# Patient Record
Sex: FEMALE | Race: BLACK OR AFRICAN AMERICAN | NOT HISPANIC OR LATINO | Employment: UNEMPLOYED | URBAN - METROPOLITAN AREA
[De-identification: names, ages, dates, MRNs, and addresses within clinical notes are randomized per-mention and may not be internally consistent; named-entity substitution may affect disease eponyms.]

---

## 2017-01-24 ENCOUNTER — GENERIC CONVERSION - ENCOUNTER (OUTPATIENT)
Dept: OTHER | Facility: OTHER | Age: 22
End: 2017-01-24

## 2017-02-07 ENCOUNTER — APPOINTMENT (OUTPATIENT)
Dept: URGENT CARE | Facility: MEDICAL CENTER | Age: 22
End: 2017-02-07

## 2017-02-07 PROCEDURE — 99212 OFFICE O/P EST SF 10 MIN: CPT

## 2017-03-01 ENCOUNTER — APPOINTMENT (OUTPATIENT)
Dept: OCCUPATIONAL MEDICINE | Facility: CLINIC | Age: 22
End: 2017-03-01
Payer: OTHER MISCELLANEOUS

## 2017-03-01 PROCEDURE — 99213 OFFICE O/P EST LOW 20 MIN: CPT

## 2017-03-17 ENCOUNTER — ALLSCRIPTS OFFICE VISIT (OUTPATIENT)
Dept: OTHER | Facility: OTHER | Age: 22
End: 2017-03-17

## 2017-03-17 ENCOUNTER — APPOINTMENT (OUTPATIENT)
Dept: OCCUPATIONAL MEDICINE | Facility: CLINIC | Age: 22
End: 2017-03-17
Payer: OTHER MISCELLANEOUS

## 2017-03-17 PROCEDURE — 99213 OFFICE O/P EST LOW 20 MIN: CPT

## 2017-03-30 ENCOUNTER — APPOINTMENT (OUTPATIENT)
Dept: OCCUPATIONAL MEDICINE | Facility: CLINIC | Age: 22
End: 2017-03-30
Payer: OTHER MISCELLANEOUS

## 2017-03-30 PROCEDURE — 99213 OFFICE O/P EST LOW 20 MIN: CPT

## 2017-04-17 ENCOUNTER — ALLSCRIPTS OFFICE VISIT (OUTPATIENT)
Dept: OTHER | Facility: OTHER | Age: 22
End: 2017-04-17

## 2017-04-17 ENCOUNTER — APPOINTMENT (OUTPATIENT)
Dept: OCCUPATIONAL MEDICINE | Facility: CLINIC | Age: 22
End: 2017-04-17
Payer: OTHER MISCELLANEOUS

## 2017-04-17 PROCEDURE — 99213 OFFICE O/P EST LOW 20 MIN: CPT

## 2017-06-27 ENCOUNTER — ALLSCRIPTS OFFICE VISIT (OUTPATIENT)
Dept: OTHER | Facility: OTHER | Age: 22
End: 2017-06-27

## 2017-06-27 ENCOUNTER — GENERIC CONVERSION - ENCOUNTER (OUTPATIENT)
Dept: OTHER | Facility: OTHER | Age: 22
End: 2017-06-27

## 2017-06-27 DIAGNOSIS — Z34.82 ENCOUNTER FOR SUPERVISION OF OTHER NORMAL PREGNANCY, SECOND TRIMESTER: ICD-10-CM

## 2017-06-27 DIAGNOSIS — Z34.90 ENCOUNTER FOR SUPERVISION OF NORMAL PREGNANCY: ICD-10-CM

## 2017-06-28 ENCOUNTER — GENERIC CONVERSION - ENCOUNTER (OUTPATIENT)
Dept: OTHER | Facility: OTHER | Age: 22
End: 2017-06-28

## 2017-06-28 PROCEDURE — 87591 N.GONORRHOEAE DNA AMP PROB: CPT | Performed by: OBSTETRICS & GYNECOLOGY

## 2017-06-28 PROCEDURE — 87491 CHLMYD TRACH DNA AMP PROBE: CPT | Performed by: OBSTETRICS & GYNECOLOGY

## 2017-06-29 ENCOUNTER — LAB REQUISITION (OUTPATIENT)
Dept: LAB | Facility: HOSPITAL | Age: 22
End: 2017-06-29
Payer: COMMERCIAL

## 2017-06-29 DIAGNOSIS — Z34.90 ENCOUNTER FOR SUPERVISION OF NORMAL PREGNANCY: ICD-10-CM

## 2017-07-05 ENCOUNTER — GENERIC CONVERSION - ENCOUNTER (OUTPATIENT)
Dept: OTHER | Facility: OTHER | Age: 22
End: 2017-07-05

## 2017-07-05 LAB
CHLAMYDIA DNA CVX QL NAA+PROBE: NORMAL
N GONORRHOEA DNA GENITAL QL NAA+PROBE: NORMAL

## 2017-07-07 ENCOUNTER — APPOINTMENT (OUTPATIENT)
Dept: LAB | Facility: CLINIC | Age: 22
End: 2017-07-07
Payer: COMMERCIAL

## 2017-07-07 DIAGNOSIS — Z34.82 ENCOUNTER FOR SUPERVISION OF OTHER NORMAL PREGNANCY, SECOND TRIMESTER: ICD-10-CM

## 2017-07-07 PROCEDURE — 36415 COLL VENOUS BLD VENIPUNCTURE: CPT

## 2017-07-07 PROCEDURE — 87389 HIV-1 AG W/HIV-1&-2 AB AG IA: CPT

## 2017-07-07 PROCEDURE — 87086 URINE CULTURE/COLONY COUNT: CPT

## 2017-07-08 LAB — BACTERIA UR CULT: NORMAL

## 2017-07-10 ENCOUNTER — GENERIC CONVERSION - ENCOUNTER (OUTPATIENT)
Dept: OTHER | Facility: OTHER | Age: 22
End: 2017-07-10

## 2017-07-10 LAB — HIV 1+2 AB+HIV1 P24 AG SERPL QL IA: NORMAL

## 2017-07-17 ENCOUNTER — GENERIC CONVERSION - ENCOUNTER (OUTPATIENT)
Dept: OTHER | Facility: OTHER | Age: 22
End: 2017-07-17

## 2017-07-26 ENCOUNTER — GENERIC CONVERSION - ENCOUNTER (OUTPATIENT)
Dept: OTHER | Facility: OTHER | Age: 22
End: 2017-07-26

## 2017-08-04 ENCOUNTER — ALLSCRIPTS OFFICE VISIT (OUTPATIENT)
Dept: PERINATAL CARE | Facility: MEDICAL CENTER | Age: 22
End: 2017-08-04
Payer: COMMERCIAL

## 2017-08-04 ENCOUNTER — GENERIC CONVERSION - ENCOUNTER (OUTPATIENT)
Dept: OTHER | Facility: OTHER | Age: 22
End: 2017-08-04

## 2017-08-04 PROCEDURE — 76817 TRANSVAGINAL US OBSTETRIC: CPT | Performed by: OBSTETRICS & GYNECOLOGY

## 2017-08-04 PROCEDURE — 76811 OB US DETAILED SNGL FETUS: CPT | Performed by: OBSTETRICS & GYNECOLOGY

## 2017-08-07 ENCOUNTER — GENERIC CONVERSION - ENCOUNTER (OUTPATIENT)
Dept: OTHER | Facility: OTHER | Age: 22
End: 2017-08-07

## 2017-08-09 ENCOUNTER — GENERIC CONVERSION - ENCOUNTER (OUTPATIENT)
Dept: OTHER | Facility: OTHER | Age: 22
End: 2017-08-09

## 2017-08-22 ENCOUNTER — GENERIC CONVERSION - ENCOUNTER (OUTPATIENT)
Dept: OTHER | Facility: OTHER | Age: 22
End: 2017-08-22

## 2017-08-22 ENCOUNTER — ALLSCRIPTS OFFICE VISIT (OUTPATIENT)
Dept: OTHER | Facility: OTHER | Age: 22
End: 2017-08-22

## 2017-08-22 DIAGNOSIS — Z34.90 ENCOUNTER FOR SUPERVISION OF NORMAL PREGNANCY: ICD-10-CM

## 2017-09-13 ENCOUNTER — APPOINTMENT (OUTPATIENT)
Dept: LAB | Facility: CLINIC | Age: 22
End: 2017-09-13
Payer: COMMERCIAL

## 2017-09-13 DIAGNOSIS — Z34.90 ENCOUNTER FOR SUPERVISION OF NORMAL PREGNANCY: ICD-10-CM

## 2017-09-13 LAB
BASOPHILS # BLD AUTO: 0.03 THOUSANDS/ΜL (ref 0–0.1)
BASOPHILS NFR BLD AUTO: 0 % (ref 0–1)
EOSINOPHIL # BLD AUTO: 0.09 THOUSAND/ΜL (ref 0–0.61)
EOSINOPHIL NFR BLD AUTO: 1 % (ref 0–6)
ERYTHROCYTE [DISTWIDTH] IN BLOOD BY AUTOMATED COUNT: 14.1 % (ref 11.6–15.1)
GLUCOSE 1H P 50 G GLC PO SERPL-MCNC: 121 MG/DL
HCT VFR BLD AUTO: 31.3 % (ref 34.8–46.1)
HGB BLD-MCNC: 10 G/DL (ref 11.5–15.4)
LYMPHOCYTES # BLD AUTO: 1.96 THOUSANDS/ΜL (ref 0.6–4.47)
LYMPHOCYTES NFR BLD AUTO: 26 % (ref 14–44)
MCH RBC QN AUTO: 26.4 PG (ref 26.8–34.3)
MCHC RBC AUTO-ENTMCNC: 31.9 G/DL (ref 31.4–37.4)
MCV RBC AUTO: 83 FL (ref 82–98)
MONOCYTES # BLD AUTO: 0.69 THOUSAND/ΜL (ref 0.17–1.22)
MONOCYTES NFR BLD AUTO: 9 % (ref 4–12)
NEUTROPHILS # BLD AUTO: 4.63 THOUSANDS/ΜL (ref 1.85–7.62)
NEUTS SEG NFR BLD AUTO: 64 % (ref 43–75)
NRBC BLD AUTO-RTO: 0 /100 WBCS
PLATELET # BLD AUTO: 321 THOUSANDS/UL (ref 149–390)
PMV BLD AUTO: 10.1 FL (ref 8.9–12.7)
RBC # BLD AUTO: 3.79 MILLION/UL (ref 3.81–5.12)
WBC # BLD AUTO: 7.44 THOUSAND/UL (ref 4.31–10.16)

## 2017-09-13 PROCEDURE — 85025 COMPLETE CBC W/AUTO DIFF WBC: CPT

## 2017-09-13 PROCEDURE — 36415 COLL VENOUS BLD VENIPUNCTURE: CPT

## 2017-09-13 PROCEDURE — 86592 SYPHILIS TEST NON-TREP QUAL: CPT

## 2017-09-13 PROCEDURE — 82950 GLUCOSE TEST: CPT

## 2017-09-14 LAB — RPR SER QL: NORMAL

## 2017-09-23 ENCOUNTER — HOSPITAL ENCOUNTER (EMERGENCY)
Facility: HOSPITAL | Age: 22
Discharge: HOME | End: 2017-09-23
Attending: EMERGENCY MEDICINE | Admitting: EMERGENCY MEDICINE
Payer: COMMERCIAL

## 2017-09-23 VITALS
BODY MASS INDEX: 34.28 KG/M2 | OXYGEN SATURATION: 99 % | SYSTOLIC BLOOD PRESSURE: 113 MMHG | DIASTOLIC BLOOD PRESSURE: 65 MMHG | WEIGHT: 186.29 LBS | RESPIRATION RATE: 20 BRPM | HEART RATE: 95 BPM | HEIGHT: 62 IN | TEMPERATURE: 98.6 F

## 2017-09-23 DIAGNOSIS — H92.02 EARACHE ON LEFT: Primary | ICD-10-CM

## 2017-09-23 PROCEDURE — 99282 EMERGENCY DEPT VISIT SF MDM: CPT

## 2017-09-23 RX ORDER — CEPHALEXIN 500 MG/1
500 CAPSULE ORAL 4 TIMES DAILY
Qty: 28 CAPSULE | Refills: 0 | Status: SHIPPED | OUTPATIENT
Start: 2017-09-23 | End: 2017-09-30

## 2017-09-23 NOTE — ED PROVIDER NOTES
History  Chief Complaint   Patient presents with   Ramón Younger     left earache patient is 30 weeks pregnant     This is a 23-year-old female who presents for recurrent left earache  She states for the past year she has had multiple ear infections most recently about a month and a half ago  On this occasion her earache began yesterday  Sudden onset  No precipitating factor  She says she has been avoiding using Q-tips at this point in time as this caused her last ear infection  No pain or swelling behind the ear  No fever no discharge from the ear  No change in her hearing  No tinnitus  On the last occasion she was treated with Keflex as well as Cipro ear drops  She also has a picture which notes swelling near the tragus of the ear  History provided by:  Patient   used: No    Earache   Location:  Left  Behind ear:  No abnormality  Quality:  Aching  Severity:  Mild  Onset quality:  Gradual  Duration:  1 day  Timing:  Constant  Progression:  Unchanged  Chronicity:  Recurrent  Context: not direct blow, not elevation change, not foreign body in ear, not loud noise, not recent URI and not water in ear    Relieved by:  Nothing  Worsened by:  Palpation  Ineffective treatments:  None tried  Associated symptoms: no abdominal pain, no congestion, no cough, no diarrhea, no ear discharge, no fever, no headaches, no hearing loss, no neck pain, no rash, no rhinorrhea, no sore throat, no tinnitus and no vomiting    Risk factors: no recent travel, no chronic ear infection and no prior ear surgery        Prior to Admission Medications   Prescriptions Last Dose Informant Patient Reported? Taking? Prenatal Multivit-Min-Fe-FA (PRENATAL VITAMINS) 0 8 MG tablet   Yes Yes   Sig: Take by mouth   Probiotic Product (PROBIOTIC-10) CAPS   Yes Yes   Sig: Take by mouth      Facility-Administered Medications: None       History reviewed  No pertinent past medical history  History reviewed   No pertinent surgical history  History reviewed  No pertinent family history  I have reviewed and agree with the history as documented  Social History   Substance Use Topics    Smoking status: Never Smoker    Smokeless tobacco: Never Used    Alcohol use No        Review of Systems   Constitutional: Negative for activity change, appetite change, chills, diaphoresis, fatigue, fever and unexpected weight change  HENT: Positive for ear pain  Negative for congestion, ear discharge, hearing loss, rhinorrhea, sinus pressure, sore throat, tinnitus and trouble swallowing  Eyes: Negative for photophobia and visual disturbance  Respiratory: Negative for apnea, cough, choking, chest tightness, shortness of breath, wheezing and stridor  Cardiovascular: Negative for chest pain, palpitations and leg swelling  Gastrointestinal: Negative for abdominal distention, abdominal pain, blood in stool, constipation, diarrhea, nausea and vomiting  Genitourinary: Negative for decreased urine volume, difficulty urinating, dysuria, enuresis, flank pain, frequency, hematuria and urgency  Musculoskeletal: Negative for arthralgias, myalgias, neck pain and neck stiffness  Skin: Negative for color change, pallor, rash and wound  Allergic/Immunologic: Negative  Neurological: Negative for dizziness, tremors, syncope, weakness, light-headedness, numbness and headaches  Hematological: Negative  Psychiatric/Behavioral: Negative  All other systems reviewed and are negative  Physical Exam  ED Triage Vitals [09/23/17 1639]   Temperature Pulse Respirations Blood Pressure SpO2   98 6 °F (37 °C) 95 20 113/65 99 %      Temp Source Heart Rate Source Patient Position - Orthostatic VS BP Location FiO2 (%)   Oral Monitor Sitting Right arm --      Pain Score       --           Physical Exam   Constitutional: She is oriented to person, place, and time  She appears well-developed and well-nourished  Non-toxic appearance   She does not have a sickly appearance  She does not appear ill  No distress  HENT:   Head: Normocephalic and atraumatic  Right Ear: Hearing, tympanic membrane, external ear and ear canal normal  No mastoid tenderness  Tympanic membrane is not injected, not scarred, not perforated, not erythematous, not retracted and not bulging  Left Ear: Hearing, tympanic membrane, external ear and ear canal normal  There is tenderness  No drainage or swelling  No mastoid tenderness  Tympanic membrane is not injected, not scarred, not perforated, not erythematous, not retracted and not bulging  No decreased hearing is noted  Pain with manipulation of the pinna  No significant swelling of the external auditory canal   There is no pain tenderness or swelling along the mastoid area  No erythema or rash or warmth to this area  No neck pain or stiffness   Eyes: EOM and lids are normal  Pupils are equal, round, and reactive to light  Neck: Normal range of motion  Neck supple  Cardiovascular: Normal rate, regular rhythm, S1 normal, S2 normal, normal heart sounds, intact distal pulses and normal pulses  Exam reveals no gallop, no distant heart sounds, no friction rub and no decreased pulses  No murmur heard  Pulses:       Radial pulses are 2+ on the right side, and 2+ on the left side  Pulmonary/Chest: Effort normal and breath sounds normal  No accessory muscle usage  No apnea, no tachypnea and no bradypnea  No respiratory distress  She has no decreased breath sounds  She has no wheezes  She has no rhonchi  She has no rales  Abdominal: Soft  Normal appearance and bowel sounds are normal  She exhibits no distension and no mass  There is no tenderness  There is no rigidity, no rebound and no guarding  No hernia  Musculoskeletal: Normal range of motion  She exhibits no edema, tenderness or deformity  Neurological: She is alert and oriented to person, place, and time  No cranial nerve deficit  GCS eye subscore is 4   GCS verbal subscore is 5  GCS motor subscore is 6  Skin: Skin is warm, dry and intact  No rash noted  She is not diaphoretic  No erythema  No pallor  Psychiatric: Her speech is normal    Nursing note and vitals reviewed  ED Medications  Medications - No data to display    Diagnostic Studies  Labs Reviewed - No data to display    No orders to display       Procedures  Procedures      Phone Contacts  ED Phone Contact    ED Course  ED Course                                MDM  Number of Diagnoses or Management Options  Earache on left: new and requires workup  Diagnosis management comments: DDx including but not limited to: Otitis media, otitis externa, perforated TM, impacted cerumen, cellulitis  Plan:  Clinically does not appear to have otitis media however there are objective signs of otitis externa which will begin treating  Risk of Complications, Morbidity, and/or Mortality  Presenting problems: low  Management options: low  General comments: 71-year-old female with left earache  Exam consistent with otitis externa  We will start the patient on Cipro ear drops  With recurrent ear infections explained to the patient she will have to follow up with ENT  I prescribed the patient a course of oral antibiotics explained to her that she should fill this only if she begins running a fever or if she still has pain after 3-4 days despite ear drops  Provided contact information for orthopedics  No signs of mastoiditis no signs of meningitis  Return parameters provided  Patient understands agrees with the plan  Patient Progress  Patient progress: stable    CritCare Time    Disposition  Final diagnoses:   Earache on left     ED Disposition     ED Disposition Condition Comment    Discharge  Vane Ripple Mingle discharge to home/self care      Condition at discharge: Good        Follow-up Information     Follow up With Specialties Details Why [address] MD Otolaryngology   [address] [place] [address], [address] [place] [address] RICHARD WILL [address] [facility]  Call in 2 days for follow up as early as possible [address], [address], [address]  [phone]        Discharge Medication List as of 9/23/2017  5:19 PM      START taking these medications    Details   cephalexin (KEFLEX) 500 mg capsule Take 1 capsule by mouth 4 (four) times a day for 7 days, Starting Sat 9/23/2017, Until Sat 9/30/2017, Print      ciprofloxacin-hydrocortisone (CIPRO HC) otic suspension Administer 3 drops into the left ear 2 (two) times a day for 7 days, Starting Sat 9/23/2017, Until Sat 9/30/2017, Print         CONTINUE these medications which have NOT CHANGED    Details   Prenatal Multivit-Min-Fe-FA (PRENATAL VITAMINS) 0 8 MG tablet Take by mouth, Historical Med      Probiotic Product (PROBIOTIC-10) CAPS Take by mouth, Historical Med           No discharge procedures on file      ED Provider  Electronically Signed by       Hector Tamayo PA-C  09/23/17 4350

## 2017-09-23 NOTE — DISCHARGE INSTRUCTIONS
Begin using ear drops now  Begin oral antibiotics only if no improvement in 3-4 days or if you begin running fever  Return sooner to the Emergency Department if vomiting, diarrhea, difficulty breathing or urinating, neck stiffness, lethargy, rash  Earache   WHAT YOU NEED TO KNOW:   What causes an earache? An earache can be caused by a problem within your ear  A problem or condition in another body area can also cause pain that travels to your ear  An earache can be caused by any of the following:  · Infection of the inner or outer ear     · Earwax buildup, or small objects put into your ear     · Ear injury caused by a cotton swab or by air pressure changes from a plane ride or scuba diving     · Other infections, such as tonsillitis or pharyngitis    · Jaw or dental problems such as cavities or TMJ    · Neck pain caused by problems such as arthritis in your upper spine  How is an earache diagnosed? Your healthcare provider will examine your ears, head, neck, and mouth  He will also ask you to describe your symptoms  You may also receive any of the following:  · Audiometry  is a test used to check for hearing loss  Your healthcare provider will play sounds at different volumes to check how much you can hear  · Tympanometry  is a test used to check pressure changes that may be a sign of problems with your inner ear  How is an earache treated? · NSAIDs , such as ibuprofen, help decrease swelling, pain, and fever  This medicine is available with or without a doctor's order  NSAIDs can cause stomach bleeding or kidney problems in certain people  If you take blood thinner medicine, always ask if NSAIDs are safe for you  Always read the medicine label and follow directions  Do not give these medicines to children under 10months of age without direction from your child's healthcare provider  · Acetaminophen  decreases pain and fever  It is available without a doctor's order   Ask how much to take and how often to take it  Follow directions  Acetaminophen can cause liver damage if not taken correctly  When should I seek immediate care? · You have a severe earache  · You have ear pain with itching, hearing loss, dizziness, a feeling of fullness in your ear, or ringing in your ears  When should I contact my healthcare provider? · Your ear pain worsens or does not go away with treatment  · You have drainage from your ear  · You have a fever  · Your outer ear becomes red, swollen, and warm  · You have questions or concerns about your condition or care  CARE AGREEMENT:   You have the right to help plan your care  Learn about your health condition and how it may be treated  Discuss treatment options with your caregivers to decide what care you want to receive  You always have the right to refuse treatment  The above information is an  only  It is not intended as medical advice for individual conditions or treatments  Talk to your doctor, nurse or pharmacist before following any medical regimen to see if it is safe and effective for you  © 2017 [address] Information is for End User's use only and may not be sold, redistributed or otherwise used for commercial purposes  All illustrations and images included in CareNotes® are the copyrighted property of [employer]  or Garrick Mario

## 2017-09-26 ENCOUNTER — GENERIC CONVERSION - ENCOUNTER (OUTPATIENT)
Dept: OTHER | Facility: OTHER | Age: 22
End: 2017-09-26

## 2017-09-27 ENCOUNTER — ALLSCRIPTS OFFICE VISIT (OUTPATIENT)
Dept: OTHER | Facility: OTHER | Age: 22
End: 2017-09-27

## 2017-09-28 ENCOUNTER — GENERIC CONVERSION - ENCOUNTER (OUTPATIENT)
Dept: OTHER | Facility: OTHER | Age: 22
End: 2017-09-28

## 2017-09-29 ENCOUNTER — GENERIC CONVERSION - ENCOUNTER (OUTPATIENT)
Dept: OTHER | Facility: OTHER | Age: 22
End: 2017-09-29

## 2017-10-06 ENCOUNTER — GENERIC CONVERSION - ENCOUNTER (OUTPATIENT)
Dept: OTHER | Facility: OTHER | Age: 22
End: 2017-10-06

## 2017-10-06 ENCOUNTER — ALLSCRIPTS OFFICE VISIT (OUTPATIENT)
Dept: PERINATAL CARE | Facility: MEDICAL CENTER | Age: 22
End: 2017-10-06
Payer: COMMERCIAL

## 2017-10-06 PROCEDURE — 76816 OB US FOLLOW-UP PER FETUS: CPT | Performed by: OBSTETRICS & GYNECOLOGY

## 2017-10-13 ENCOUNTER — GENERIC CONVERSION - ENCOUNTER (OUTPATIENT)
Dept: OTHER | Facility: OTHER | Age: 22
End: 2017-10-13

## 2017-10-16 ENCOUNTER — GENERIC CONVERSION - ENCOUNTER (OUTPATIENT)
Dept: OTHER | Facility: OTHER | Age: 22
End: 2017-10-16

## 2017-10-19 ENCOUNTER — GENERIC CONVERSION - ENCOUNTER (OUTPATIENT)
Dept: OTHER | Facility: OTHER | Age: 22
End: 2017-10-19

## 2017-10-23 ENCOUNTER — GENERIC CONVERSION - ENCOUNTER (OUTPATIENT)
Dept: OTHER | Facility: OTHER | Age: 22
End: 2017-10-23

## 2017-10-25 ENCOUNTER — GENERIC CONVERSION - ENCOUNTER (OUTPATIENT)
Dept: OTHER | Facility: OTHER | Age: 22
End: 2017-10-25

## 2017-10-30 ENCOUNTER — GENERIC CONVERSION - ENCOUNTER (OUTPATIENT)
Dept: OTHER | Facility: OTHER | Age: 22
End: 2017-10-30

## 2017-11-02 ENCOUNTER — GENERIC CONVERSION - ENCOUNTER (OUTPATIENT)
Dept: OTHER | Facility: OTHER | Age: 22
End: 2017-11-02

## 2017-11-08 ENCOUNTER — TRANSCRIBE ORDERS (OUTPATIENT)
Dept: LAB | Facility: CLINIC | Age: 22
End: 2017-11-08

## 2017-11-08 ENCOUNTER — APPOINTMENT (OUTPATIENT)
Dept: LAB | Facility: HOSPITAL | Age: 22
End: 2017-11-08
Attending: OBSTETRICS & GYNECOLOGY
Payer: COMMERCIAL

## 2017-11-08 ENCOUNTER — APPOINTMENT (OUTPATIENT)
Dept: LAB | Facility: CLINIC | Age: 22
End: 2017-11-08
Payer: COMMERCIAL

## 2017-11-08 ENCOUNTER — GENERIC CONVERSION - ENCOUNTER (OUTPATIENT)
Dept: OTHER | Facility: OTHER | Age: 22
End: 2017-11-08

## 2017-11-08 DIAGNOSIS — Z34.90 ENCOUNTER FOR SUPERVISION OF NORMAL PREGNANCY: ICD-10-CM

## 2017-11-08 PROCEDURE — 87653 STREP B DNA AMP PROBE: CPT

## 2017-11-08 PROCEDURE — 36415 COLL VENOUS BLD VENIPUNCTURE: CPT

## 2017-11-08 PROCEDURE — 87389 HIV-1 AG W/HIV-1&-2 AB AG IA: CPT

## 2017-11-09 ENCOUNTER — GENERIC CONVERSION - ENCOUNTER (OUTPATIENT)
Dept: OTHER | Facility: OTHER | Age: 22
End: 2017-11-09

## 2017-11-10 LAB — HIV 1+2 AB+HIV1 P24 AG SERPL QL IA: NORMAL

## 2017-11-11 LAB — GP B STREP DNA SPEC QL NAA+PROBE: ABNORMAL

## 2017-11-15 ENCOUNTER — GENERIC CONVERSION - ENCOUNTER (OUTPATIENT)
Dept: OTHER | Facility: OTHER | Age: 22
End: 2017-11-15

## 2017-11-22 ENCOUNTER — GENERIC CONVERSION - ENCOUNTER (OUTPATIENT)
Dept: OTHER | Facility: OTHER | Age: 22
End: 2017-11-22

## 2017-11-28 ENCOUNTER — GENERIC CONVERSION - ENCOUNTER (OUTPATIENT)
Dept: OTHER | Facility: OTHER | Age: 22
End: 2017-11-28

## 2017-12-01 ENCOUNTER — HOSPITAL ENCOUNTER (EMERGENCY)
Facility: HOSPITAL | Age: 22
Discharge: STILL ADMITTED OR ADMINISTRATIVE | End: 2017-12-01
Payer: COMMERCIAL

## 2017-12-01 ENCOUNTER — ANESTHESIA (EMERGENCY)
Dept: LABOR AND DELIVERY | Facility: HOSPITAL | Age: 22
End: 2017-12-01
Payer: COMMERCIAL

## 2017-12-01 ENCOUNTER — ANESTHESIA EVENT (EMERGENCY)
Dept: LABOR AND DELIVERY | Facility: HOSPITAL | Age: 22
End: 2017-12-01
Payer: COMMERCIAL

## 2017-12-01 ENCOUNTER — HOSPITAL ENCOUNTER (INPATIENT)
Facility: HOSPITAL | Age: 22
LOS: 3 days | Discharge: HOME | DRG: 540 | End: 2017-12-04
Attending: OBSTETRICS & GYNECOLOGY | Admitting: OBSTETRICS & GYNECOLOGY
Payer: COMMERCIAL

## 2017-12-01 DIAGNOSIS — Z3A.39 39 WEEKS GESTATION OF PREGNANCY: Primary | ICD-10-CM

## 2017-12-01 DIAGNOSIS — Z98.891 S/P CESAREAN SECTION: ICD-10-CM

## 2017-12-01 DIAGNOSIS — O99.820 GROUP B STREPTOCOCCUS CARRIER, ANTEPARTUM: ICD-10-CM

## 2017-12-01 LAB
ABO GROUP BLD: NORMAL
BASE EXCESS BLDCOA CALC-SCNC: -4.5 MMOL/L (ref 3–11)
BASE EXCESS BLDCOV CALC-SCNC: -5.8 MMOL/L (ref 1–9)
BASOPHILS # BLD AUTO: 0.01 THOUSANDS/ΜL (ref 0–0.1)
BASOPHILS NFR BLD AUTO: 0 % (ref 0–1)
BLD GP AB SCN SERPL QL: NEGATIVE
EOSINOPHIL # BLD AUTO: 0.08 THOUSAND/ΜL (ref 0–0.61)
EOSINOPHIL NFR BLD AUTO: 1 % (ref 0–6)
ERYTHROCYTE [DISTWIDTH] IN BLOOD BY AUTOMATED COUNT: 15.4 % (ref 11.6–15.1)
HCO3 BLDCOA-SCNC: 22.6 MMOL/L (ref 17.3–27.3)
HCO3 BLDCOV-SCNC: 19.6 MMOL/L (ref 12.2–28.6)
HCT VFR BLD AUTO: 36.2 % (ref 34.8–46.1)
HGB BLD-MCNC: 11.6 G/DL (ref 11.5–15.4)
LYMPHOCYTES # BLD AUTO: 2.52 THOUSANDS/ΜL (ref 0.6–4.47)
LYMPHOCYTES NFR BLD AUTO: 25 % (ref 14–44)
MCH RBC QN AUTO: 25.4 PG (ref 26.8–34.3)
MCHC RBC AUTO-ENTMCNC: 32 G/DL (ref 31.4–37.4)
MCV RBC AUTO: 79 FL (ref 82–98)
MONOCYTES # BLD AUTO: 1.06 THOUSAND/ΜL (ref 0.17–1.22)
MONOCYTES NFR BLD AUTO: 10 % (ref 4–12)
NEUTROPHILS # BLD AUTO: 6.48 THOUSANDS/ΜL (ref 1.85–7.62)
NEUTS SEG NFR BLD AUTO: 64 % (ref 43–75)
NRBC BLD AUTO-RTO: 0 /100 WBCS
O2 CT VFR BLDCOA CALC: 11 ML/DL
OXYHGB MFR BLDCOA: 51.7 %
OXYHGB MFR BLDCOV: 78.8 %
PCO2 BLDCOA: 49 MM[HG] (ref 30–60)
PCO2 BLDCOV: 38.2 MM HG (ref 27–43)
PH BLDCOA: 7.28 [PH] (ref 7.23–7.43)
PH BLDCOV: 7.33 [PH] (ref 7.19–7.49)
PLATELET # BLD AUTO: 259 THOUSANDS/UL (ref 149–390)
PMV BLD AUTO: 10.3 FL (ref 8.9–12.7)
PO2 BLDCOA: 22.7 MM HG (ref 5–25)
PO2 BLDCOV: 35.5 MM HG (ref 15–45)
RBC # BLD AUTO: 4.57 MILLION/UL (ref 3.81–5.12)
RH BLD: POSITIVE
RPR SER QL: NORMAL
SAO2 % BLDCOV: 16.9 ML/DL
SPECIMEN EXPIRATION DATE: NORMAL
WBC # BLD AUTO: 10.21 THOUSAND/UL (ref 4.31–10.16)

## 2017-12-01 PROCEDURE — 86900 BLOOD TYPING SEROLOGIC ABO: CPT | Performed by: OBSTETRICS & GYNECOLOGY

## 2017-12-01 PROCEDURE — 82805 BLOOD GASES W/O2 SATURATION: CPT | Performed by: OBSTETRICS & GYNECOLOGY

## 2017-12-01 PROCEDURE — 88307 TISSUE EXAM BY PATHOLOGIST: CPT | Performed by: OBSTETRICS & GYNECOLOGY

## 2017-12-01 PROCEDURE — 10907ZC DRAINAGE OF AMNIOTIC FLUID, THERAPEUTIC FROM PRODUCTS OF CONCEPTION, VIA NATURAL OR ARTIFICIAL OPENING: ICD-10-PCS | Performed by: OBSTETRICS & GYNECOLOGY

## 2017-12-01 PROCEDURE — 86901 BLOOD TYPING SEROLOGIC RH(D): CPT | Performed by: OBSTETRICS & GYNECOLOGY

## 2017-12-01 PROCEDURE — 4A0HXCZ MEASUREMENT OF PRODUCTS OF CONCEPTION, CARDIAC RATE, EXTERNAL APPROACH: ICD-10-PCS | Performed by: OBSTETRICS & GYNECOLOGY

## 2017-12-01 PROCEDURE — 86850 RBC ANTIBODY SCREEN: CPT | Performed by: OBSTETRICS & GYNECOLOGY

## 2017-12-01 PROCEDURE — 85025 COMPLETE CBC W/AUTO DIFF WBC: CPT | Performed by: OBSTETRICS & GYNECOLOGY

## 2017-12-01 PROCEDURE — 99204 OFFICE O/P NEW MOD 45 MIN: CPT

## 2017-12-01 PROCEDURE — 86592 SYPHILIS TEST NON-TREP QUAL: CPT | Performed by: OBSTETRICS & GYNECOLOGY

## 2017-12-01 RX ORDER — OXYTOCIN/RINGER'S LACTATE 30/500 ML
PLASTIC BAG, INJECTION (ML) INTRAVENOUS
Status: DISPENSED
Start: 2017-12-01 | End: 2017-12-01

## 2017-12-01 RX ORDER — DIPHENHYDRAMINE HYDROCHLORIDE 50 MG/ML
25 INJECTION INTRAMUSCULAR; INTRAVENOUS EVERY 6 HOURS PRN
Status: DISCONTINUED | OUTPATIENT
Start: 2017-12-01 | End: 2017-12-01

## 2017-12-01 RX ORDER — ACETAMINOPHEN 325 MG/1
650 TABLET ORAL EVERY 6 HOURS PRN
Status: DISCONTINUED | OUTPATIENT
Start: 2017-12-01 | End: 2017-12-04 | Stop reason: HOSPADM

## 2017-12-01 RX ORDER — DIPHENHYDRAMINE HCL 25 MG
25 TABLET ORAL EVERY 6 HOURS PRN
Status: DISCONTINUED | OUTPATIENT
Start: 2017-12-01 | End: 2017-12-04 | Stop reason: HOSPADM

## 2017-12-01 RX ORDER — OXYCODONE HYDROCHLORIDE AND ACETAMINOPHEN 5; 325 MG/1; MG/1
2 TABLET ORAL EVERY 4 HOURS PRN
Status: DISCONTINUED | OUTPATIENT
Start: 2017-12-02 | End: 2017-12-04 | Stop reason: HOSPADM

## 2017-12-01 RX ORDER — ONDANSETRON 2 MG/ML
4 INJECTION INTRAMUSCULAR; INTRAVENOUS EVERY 4 HOURS PRN
Status: DISCONTINUED | OUTPATIENT
Start: 2017-12-01 | End: 2017-12-01

## 2017-12-01 RX ORDER — ONDANSETRON 2 MG/ML
4 INJECTION INTRAMUSCULAR; INTRAVENOUS EVERY 8 HOURS PRN
Status: DISCONTINUED | OUTPATIENT
Start: 2017-12-01 | End: 2017-12-04 | Stop reason: HOSPADM

## 2017-12-01 RX ORDER — IBUPROFEN 600 MG/1
600 TABLET ORAL EVERY 6 HOURS PRN
Status: DISCONTINUED | OUTPATIENT
Start: 2017-12-01 | End: 2017-12-04 | Stop reason: HOSPADM

## 2017-12-01 RX ORDER — SODIUM CHLORIDE, SODIUM LACTATE, POTASSIUM CHLORIDE, CALCIUM CHLORIDE 600; 310; 30; 20 MG/100ML; MG/100ML; MG/100ML; MG/100ML
125 INJECTION, SOLUTION INTRAVENOUS CONTINUOUS
Status: DISCONTINUED | OUTPATIENT
Start: 2017-12-01 | End: 2017-12-01

## 2017-12-01 RX ORDER — KETOROLAC TROMETHAMINE 30 MG/ML
30 INJECTION, SOLUTION INTRAMUSCULAR; INTRAVENOUS EVERY 6 HOURS
Status: DISPENSED | OUTPATIENT
Start: 2017-12-01 | End: 2017-12-02

## 2017-12-01 RX ORDER — METOPROLOL TARTRATE 5 MG/5ML
INJECTION INTRAVENOUS AS NEEDED
Status: DISCONTINUED | OUTPATIENT
Start: 2017-12-01 | End: 2017-12-01 | Stop reason: SURG

## 2017-12-01 RX ORDER — FENTANYL CITRATE 50 UG/ML
INJECTION, SOLUTION INTRAMUSCULAR; INTRAVENOUS AS NEEDED
Status: DISCONTINUED | OUTPATIENT
Start: 2017-12-01 | End: 2017-12-01 | Stop reason: SURG

## 2017-12-01 RX ORDER — BUPIVACAINE HYDROCHLORIDE 2.5 MG/ML
INJECTION, SOLUTION INFILTRATION; PERINEURAL AS NEEDED
Status: DISCONTINUED | OUTPATIENT
Start: 2017-12-01 | End: 2017-12-01 | Stop reason: SURG

## 2017-12-01 RX ORDER — TERBUTALINE SULFATE 1 MG/ML
INJECTION, SOLUTION SUBCUTANEOUS
Status: COMPLETED
Start: 2017-12-01 | End: 2017-12-01

## 2017-12-01 RX ORDER — FENTANYL CITRATE 50 UG/ML
INJECTION, SOLUTION INTRAMUSCULAR; INTRAVENOUS
Status: DISPENSED
Start: 2017-12-01 | End: 2017-12-01

## 2017-12-01 RX ORDER — KETAMINE HYDROCHLORIDE 50 MG/ML
INJECTION, SOLUTION, CONCENTRATE INTRAMUSCULAR; INTRAVENOUS AS NEEDED
Status: DISCONTINUED | OUTPATIENT
Start: 2017-12-01 | End: 2017-12-01 | Stop reason: SURG

## 2017-12-01 RX ORDER — DIAPER,BRIEF,INFANT-TODD,DISP
1 EACH MISCELLANEOUS DAILY PRN
Status: DISCONTINUED | OUTPATIENT
Start: 2017-12-01 | End: 2017-12-04 | Stop reason: HOSPADM

## 2017-12-01 RX ORDER — NALBUPHINE HCL 10 MG/ML
10 AMPUL (ML) INJECTION
Status: DISCONTINUED | OUTPATIENT
Start: 2017-12-01 | End: 2017-12-04 | Stop reason: HOSPADM

## 2017-12-01 RX ORDER — METOCLOPRAMIDE HYDROCHLORIDE 5 MG/ML
5 INJECTION INTRAMUSCULAR; INTRAVENOUS EVERY 6 HOURS PRN
Status: ACTIVE | OUTPATIENT
Start: 2017-12-01 | End: 2017-12-02

## 2017-12-01 RX ORDER — SODIUM CHLORIDE, SODIUM LACTATE, POTASSIUM CHLORIDE, CALCIUM CHLORIDE 600; 310; 30; 20 MG/100ML; MG/100ML; MG/100ML; MG/100ML
75 INJECTION, SOLUTION INTRAVENOUS CONTINUOUS
Status: DISCONTINUED | OUTPATIENT
Start: 2017-12-01 | End: 2017-12-01

## 2017-12-01 RX ORDER — MORPHINE SULFATE 0.5 MG/ML
INJECTION, SOLUTION EPIDURAL; INTRATHECAL; INTRAVENOUS AS NEEDED
Status: DISCONTINUED | OUTPATIENT
Start: 2017-12-01 | End: 2017-12-01 | Stop reason: SURG

## 2017-12-01 RX ORDER — ONDANSETRON 2 MG/ML
4 INJECTION INTRAMUSCULAR; INTRAVENOUS EVERY 6 HOURS PRN
Status: DISCONTINUED | OUTPATIENT
Start: 2017-12-01 | End: 2017-12-01

## 2017-12-01 RX ORDER — OXYTOCIN/RINGER'S LACTATE 30/500 ML
62.5 PLASTIC BAG, INJECTION (ML) INTRAVENOUS CONTINUOUS
Status: DISPENSED | OUTPATIENT
Start: 2017-12-01 | End: 2017-12-01

## 2017-12-01 RX ORDER — OXYCODONE HYDROCHLORIDE AND ACETAMINOPHEN 5; 325 MG/1; MG/1
1 TABLET ORAL EVERY 4 HOURS PRN
Status: DISCONTINUED | OUTPATIENT
Start: 2017-12-02 | End: 2017-12-04 | Stop reason: HOSPADM

## 2017-12-01 RX ORDER — EPHEDRINE SULFATE 50 MG/ML
INJECTION, SOLUTION INTRAVENOUS AS NEEDED
Status: DISCONTINUED | OUTPATIENT
Start: 2017-12-01 | End: 2017-12-01 | Stop reason: SURG

## 2017-12-01 RX ORDER — DIPHENHYDRAMINE HYDROCHLORIDE 50 MG/ML
25 INJECTION INTRAMUSCULAR; INTRAVENOUS EVERY 6 HOURS PRN
Status: DISCONTINUED | OUTPATIENT
Start: 2017-12-01 | End: 2017-12-04 | Stop reason: HOSPADM

## 2017-12-01 RX ORDER — SIMETHICONE 80 MG
80 TABLET,CHEWABLE ORAL 4 TIMES DAILY PRN
Status: DISCONTINUED | OUTPATIENT
Start: 2017-12-01 | End: 2017-12-03

## 2017-12-01 RX ORDER — DEXAMETHASONE SODIUM PHOSPHATE 4 MG/ML
8 INJECTION, SOLUTION INTRA-ARTICULAR; INTRALESIONAL; INTRAMUSCULAR; INTRAVENOUS; SOFT TISSUE ONCE AS NEEDED
Status: ACTIVE | OUTPATIENT
Start: 2017-12-01 | End: 2017-12-02

## 2017-12-01 RX ORDER — CHLOROPROCAINE HYDROCHLORIDE 30 MG/ML
INJECTION, SOLUTION EPIDURAL; INFILTRATION; INTRACAUDAL; PERINEURAL AS NEEDED
Status: DISCONTINUED | OUTPATIENT
Start: 2017-12-01 | End: 2017-12-01 | Stop reason: SURG

## 2017-12-01 RX ORDER — MIDAZOLAM HYDROCHLORIDE 1 MG/ML
INJECTION INTRAMUSCULAR; INTRAVENOUS AS NEEDED
Status: DISCONTINUED | OUTPATIENT
Start: 2017-12-01 | End: 2017-12-01 | Stop reason: SURG

## 2017-12-01 RX ORDER — DOCUSATE SODIUM 100 MG/1
100 CAPSULE, LIQUID FILLED ORAL 2 TIMES DAILY
Status: DISCONTINUED | OUTPATIENT
Start: 2017-12-01 | End: 2017-12-04 | Stop reason: HOSPADM

## 2017-12-01 RX ORDER — METRONIDAZOLE 500 MG/1
500 TABLET ORAL EVERY 12 HOURS SCHEDULED
Status: DISCONTINUED | OUTPATIENT
Start: 2017-12-01 | End: 2017-12-04 | Stop reason: HOSPADM

## 2017-12-01 RX ORDER — SODIUM CHLORIDE, SODIUM LACTATE, POTASSIUM CHLORIDE, CALCIUM CHLORIDE 600; 310; 30; 20 MG/100ML; MG/100ML; MG/100ML; MG/100ML
125 INJECTION, SOLUTION INTRAVENOUS CONTINUOUS
Status: DISCONTINUED | OUTPATIENT
Start: 2017-12-01 | End: 2017-12-02

## 2017-12-01 RX ORDER — METOCLOPRAMIDE HYDROCHLORIDE 5 MG/ML
5 INJECTION INTRAMUSCULAR; INTRAVENOUS EVERY 6 HOURS PRN
Status: DISCONTINUED | OUTPATIENT
Start: 2017-12-01 | End: 2017-12-01

## 2017-12-01 RX ORDER — KETOROLAC TROMETHAMINE 30 MG/ML
INJECTION, SOLUTION INTRAMUSCULAR; INTRAVENOUS AS NEEDED
Status: DISCONTINUED | OUTPATIENT
Start: 2017-12-01 | End: 2017-12-01 | Stop reason: SURG

## 2017-12-01 RX ORDER — CEFAZOLIN SODIUM 1 G/3ML
INJECTION, POWDER, FOR SOLUTION INTRAMUSCULAR; INTRAVENOUS AS NEEDED
Status: DISCONTINUED | OUTPATIENT
Start: 2017-12-01 | End: 2017-12-01 | Stop reason: SURG

## 2017-12-01 RX ORDER — ONDANSETRON 2 MG/ML
4 INJECTION INTRAMUSCULAR; INTRAVENOUS EVERY 4 HOURS PRN
Status: ACTIVE | OUTPATIENT
Start: 2017-12-01 | End: 2017-12-02

## 2017-12-01 RX ADMIN — LIDOCAINE HYDROCHLORIDE 5 ML: 20 INJECTION, SOLUTION INTRAVENOUS at 03:50

## 2017-12-01 RX ADMIN — FENTANYL CITRATE 10 MCG: 50 INJECTION, SOLUTION INTRAMUSCULAR; INTRAVENOUS at 01:44

## 2017-12-01 RX ADMIN — KETOROLAC TROMETHAMINE 30 MG: 30 INJECTION, SOLUTION INTRAMUSCULAR at 04:02

## 2017-12-01 RX ADMIN — SODIUM CHLORIDE, POTASSIUM CHLORIDE, SODIUM LACTATE AND CALCIUM CHLORIDE 125 ML/HR: 600; 310; 30; 20 INJECTION, SOLUTION INTRAVENOUS at 01:54

## 2017-12-01 RX ADMIN — HYDROMORPHONE HYDROCHLORIDE 0.2 MG: 1 INJECTION, SOLUTION INTRAMUSCULAR; INTRAVENOUS; SUBCUTANEOUS at 06:06

## 2017-12-01 RX ADMIN — SODIUM CHLORIDE, POTASSIUM CHLORIDE, SODIUM LACTATE AND CALCIUM CHLORIDE 999 ML/HR: 600; 310; 30; 20 INJECTION, SOLUTION INTRAVENOUS at 00:25

## 2017-12-01 RX ADMIN — EPHEDRINE SULFATE 25 MG: 50 INJECTION, SOLUTION INTRAMUSCULAR; INTRAVENOUS; SUBCUTANEOUS at 03:52

## 2017-12-01 RX ADMIN — MIDAZOLAM HYDROCHLORIDE 2 MG: 1 INJECTION, SOLUTION INTRAMUSCULAR; INTRAVENOUS at 03:29

## 2017-12-01 RX ADMIN — ONDANSETRON 4 MG: 2 INJECTION INTRAMUSCULAR; INTRAVENOUS at 03:28

## 2017-12-01 RX ADMIN — TERBUTALINE SULFATE 0.25 MG: 1 INJECTION SUBCUTANEOUS at 02:25

## 2017-12-01 RX ADMIN — HYDROMORPHONE HYDROCHLORIDE 0.5 MG: 1 INJECTION, SOLUTION INTRAMUSCULAR; INTRAVENOUS; SUBCUTANEOUS at 15:14

## 2017-12-01 RX ADMIN — KETOROLAC TROMETHAMINE 30 MG: 30 INJECTION, SOLUTION INTRAMUSCULAR at 23:45

## 2017-12-01 RX ADMIN — METOPROLOL TARTRATE 2 MG: 1 INJECTION, SOLUTION INTRAVENOUS at 03:36

## 2017-12-01 RX ADMIN — DIPHENHYDRAMINE HYDROCHLORIDE 25 MG: 50 INJECTION, SOLUTION INTRAMUSCULAR; INTRAVENOUS at 03:42

## 2017-12-01 RX ADMIN — HYDROMORPHONE HYDROCHLORIDE 0.2 MG: 1 INJECTION, SOLUTION INTRAMUSCULAR; INTRAVENOUS; SUBCUTANEOUS at 05:17

## 2017-12-01 RX ADMIN — OXYTOCIN 250 MILLI-UNITS/MIN: 10 INJECTION, SOLUTION INTRAMUSCULAR; INTRAVENOUS at 03:28

## 2017-12-01 RX ADMIN — HYDROMORPHONE HYDROCHLORIDE 0.5 MG: 1 INJECTION, SOLUTION INTRAMUSCULAR; INTRAVENOUS; SUBCUTANEOUS at 08:12

## 2017-12-01 RX ADMIN — SIMETHICONE CHEW TAB 80 MG 80 MG: 80 TABLET ORAL at 15:15

## 2017-12-01 RX ADMIN — AZITHROMYCIN MONOHYDRATE 500 MG: 500 INJECTION, POWDER, LYOPHILIZED, FOR SOLUTION INTRAVENOUS at 05:12

## 2017-12-01 RX ADMIN — HYDROMORPHONE HYDROCHLORIDE 0.5 MG: 1 INJECTION, SOLUTION INTRAMUSCULAR; INTRAVENOUS; SUBCUTANEOUS at 20:15

## 2017-12-01 RX ADMIN — HYDROMORPHONE HYDROCHLORIDE 0.2 MG: 1 INJECTION, SOLUTION INTRAMUSCULAR; INTRAVENOUS; SUBCUTANEOUS at 04:56

## 2017-12-01 RX ADMIN — CHLOROPROCAINE HYDROCHLORIDE 20 ML: 30 INJECTION, SOLUTION EPIDURAL; INFILTRATION at 03:23

## 2017-12-01 RX ADMIN — METRONIDAZOLE 500 MG: 500 TABLET ORAL at 16:23

## 2017-12-01 RX ADMIN — SODIUM CHLORIDE, SODIUM LACTATE, POTASSIUM CHLORIDE, AND CALCIUM CHLORIDE 125 ML/HR: .6; .31; .03; .02 INJECTION, SOLUTION INTRAVENOUS at 07:05

## 2017-12-01 RX ADMIN — DOCUSATE SODIUM 100 MG: 100 CAPSULE, LIQUID FILLED ORAL at 08:14

## 2017-12-01 RX ADMIN — HYDROMORPHONE HYDROCHLORIDE 0.2 MG: 1 INJECTION, SOLUTION INTRAMUSCULAR; INTRAVENOUS; SUBCUTANEOUS at 05:47

## 2017-12-01 RX ADMIN — MORPHINE SULFATE 3 MG: 0.5 INJECTION, SOLUTION EPIDURAL; INTRATHECAL; INTRAVENOUS at 03:56

## 2017-12-01 RX ADMIN — KETOROLAC TROMETHAMINE 30 MG: 30 INJECTION, SOLUTION INTRAMUSCULAR at 16:24

## 2017-12-01 RX ADMIN — Medication 62.5 MILLI-UNITS/MIN: at 07:35

## 2017-12-01 RX ADMIN — KETAMINE HYDROCHLORIDE 50 MG: 50 INJECTION, SOLUTION INTRAMUSCULAR; INTRAVENOUS at 03:29

## 2017-12-01 RX ADMIN — HYDROMORPHONE HYDROCHLORIDE 0.2 MG: 1 INJECTION, SOLUTION INTRAMUSCULAR; INTRAVENOUS; SUBCUTANEOUS at 05:32

## 2017-12-01 RX ADMIN — CEFAZOLIN 2000 MG: 1 INJECTION, POWDER, FOR SOLUTION INTRAVENOUS at 03:34

## 2017-12-01 RX ADMIN — SIMETHICONE CHEW TAB 80 MG 80 MG: 80 TABLET ORAL at 08:20

## 2017-12-01 RX ADMIN — KETOROLAC TROMETHAMINE 30 MG: 30 INJECTION, SOLUTION INTRAMUSCULAR at 10:09

## 2017-12-01 RX ADMIN — SODIUM CHLORIDE 5 MILLION UNITS: 0.9 INJECTION, SOLUTION INTRAVENOUS at 01:00

## 2017-12-01 RX ADMIN — BUPIVACAINE HYDROCHLORIDE 1 ML: 2.5 INJECTION, SOLUTION INFILTRATION; PERINEURAL at 01:44

## 2017-12-01 RX ADMIN — DOCUSATE SODIUM 100 MG: 100 CAPSULE, LIQUID FILLED ORAL at 17:52

## 2017-12-01 RX ADMIN — Medication: at 01:50

## 2017-12-01 RX ADMIN — METOCLOPRAMIDE 10 MG: 5 INJECTION, SOLUTION INTRAMUSCULAR; INTRAVENOUS at 03:28

## 2017-12-01 NOTE — OB LABOR/OXYTOCIN SAFETY PROGRESS
Labor Progress Note - Amadou Lacy 20 y o  female MRN: [id number]    Unit/Bed#: -01 Encounter: [id number]    Obstetric History       T1      L1     SAB1   TAB1   Ectopic0   Multiple0   Live Births1      Gestational Age: 37w11d     Contraction Frequency (minutes): 2-3     Tachysystole: No   Dilation: 3-4        Effacement (%): 90  Station: -3  Baseline Rate: 130 bpm     FHR Category: Category II, moderate variability, with prolonged decel and variables          Notes/comments:   Called to room by nurse for prolonged decel  Pt was repositioned and given oxygen  Tones noted down in 60s  FHT preceeded to demonstrate variable decels with contractions  IUPC placed and 500cc amnioinfusion started  Continued to monitor in room and FHTs returned to baseline and decels resolved   Will continue to monitor           Larry Posey MD 2017 1:26 AM

## 2017-12-01 NOTE — LACTATION NOTE
This note was copied from a baby's chart  CONSULT - LACTATION  Baby Boy Smith Greene 0 days male MRN: [id number]    [facility] Room / Bed: (N)/(N) Encounter: [id number]    Maternal Information     MOTHER:  Sage Montano  Maternal Age: 20 y o    OB History: #: 1, Date: , Sex: None, Weight: None, GA: None, Delivery: None, Apgar1: None, Apgar5: None, Living: None, Birth Comments: None    #: 2, Date: , Sex: None, Weight: None, GA: None, Delivery: None, Apgar1: None, Apgar5: None, Living: None, Birth Comments: None    #: 3, Date: 2015, Sex: Female, Weight: 3175 g (7 lb), GA: 39w0d, Delivery: None, Apgar1: None, Apgar5: None, Living: Living, Birth Comments: None    #: 4, Date: 17, Sex: Male, Weight: 2930 g (6 lb 7 4 oz), GA: 39w6d, Delivery: , Low Transverse, Apgar1: 8, Apgar5: 9, Living: Living, Birth Comments: None   Previouse breast reduction surgery? No, MOB Arrived with nipple piercings which were removed after breastfeeding education was provided at her request     Lactation history:   Has patient previously breast fed: Yes   How long had patient previously breast fed: 2 weeks   Previous breast feeding complications: Low milk supply, Lack of support     Past Surgical History:   Procedure Laterality Date    INDUCED          Birth information:  YOB: 2018   Time of birth: 3:29 AM   Sex: male   Delivery type: , Low Transverse   Birth Weight: 2930 g (6 lb 7 4 oz)   Percent of Weight Change: 0%     Gestational Age: 37w11d   [unfilled]    Assessment     Breast and nipple assessment: normal assessment  [place] Assessment: not assessed at this time    Feeding assessment: not assessed at this time  LATCH:  Latch:      Audible Swallowing:     Type of Nipple: Everted (After stimulation)   Comfort (Breast/Nipple):     Hold (Positioning):     LATCH Score: 2          Feeding recommendations: pump every 2-3 hours, call when infant is awake and ready to latch, Currently, infant is on glucometer protocol for SGA and formula feeding for low serum glucose levels  Met with mother  Provided mother with Ready, Set, Baby booklet  Discussed Skin to Skin contact an benefits to mom and baby  Talked about the delay of the first bath until baby has adjusted  Spoke about the benefits of rooming in  Feeding on cue and what that means for recognizing infant's hunger  Avoidance of pacifiers for the first month discussed  Talked about exclusive breastfeeding for the first 6 months  Positioning and Latch reviewed as well as showing images of other feeding positions  Discussed the properties of a good latch in any position  Reviewed hand/manual expression  Discussed s/s that baby is getting enough milk and some s/s that breastfeeding dyad may need further help  Gave information on common concerns, what to expect the first few weeks after delivery, preparing for other caregivers, and how partners can help  Resources for support also provided  Instructions given on pumping  Discussed when to start, frequency, different pumps available versus manual expression  Discussed hygiene of hands and supplies as well as assembly, placement of flanges, size of flanged, preparing the breast and cycles and suction settings on pump  Demonstrated use of hand pump  Discussed labeling of milk, storage, and preparation of stored milk  Information on hand expression given  Discussed benefits of knowing how to manually express breast including stimulating milk supply, softening nipple for latch and evacuating breast in the event of engorgement  Encouraged MOB to call for assistance, questions, and concerns about breastfeeding  Extension provided    Josefina Shin RN 12/1/2017 10:55 AM

## 2017-12-01 NOTE — DISCHARGE INSTRUCTIONS
Section   WHAT YOU SHOULD KNOW:   A  delivery, or , is abdominal surgery to deliver your baby  There are many reasons you may need a   · A  may be scheduled before labor if you had a  with your last baby  It may be scheduled if your baby is not positioned normally, or you are pregnant with more than 1 baby  · Your caregiver may perform an emergency  during labor to prevent life-threatening complications for you or your baby  A  may be done if your cervix does not dilate after several hours of active labor  · Other reasons for a  include maternal infections and problems with the placenta  AFTER YOU LEAVE:   Medicines:   · Prescription pain medicine  may be given  Ask how to take this medicine safely  · Acetaminophen  decreases pain and fever  It is available without a doctor's order  Ask how much to take and how often to take it  Follow directions  Acetaminophen can cause liver damage if not taken correctly  · NSAIDs  help decrease swelling and pain or fever  This medicine is available with or without a doctor's order  NSAIDs can cause stomach bleeding or kidney problems in certain people  If you take blood thinner medicine, always ask your obstetrician if NSAIDs are safe for you  Always read the medicine label and follow directions  · Take your medicine as directed  Contact your obstetrician (OB) if you think your medicine is not helping or if you have side effects  Tell him if you are allergic to any medicine  Keep a list of the medicines, vitamins, and herbs you take  Include the amounts, and when and why you take them  Bring the list or the pill bottles to follow-up visits  Carry your medicine list with you in case of an emergency  Follow up with your OB as directed: You may need to return to have your stitches or staples removed   Write down your questions so you remember to ask them during your visits  Wound care:  Carefully wash your wound with soap and water every day  Keep your wound clean and dry  Wear loose, comfortable clothes that do not rub against your wound  Ask your OB about bathing and showering  Drink plenty of liquids: You can lower your risk for a blood clot if you drink plenty of liquids  Ask how much liquid to drink each day and which liquids are best for you  Limit activity until you have fully recovered from surgery:   · Ask when it is safe for you to drive, walk up stairs, lift heavy objects, and have sex  · Ask when it is okay to exercise, and what types of exercise to do  Start slowly and do more as you get stronger  Contact your OB if:   · You have heavy vaginal bleeding that fills 1 or more sanitary pads in 1 hour  · You have a fever  · Your incision is swollen, red, or draining pus  · You have questions or concerns about yourself or your baby  Seek care immediately or call 911 if:   · Blood soaks through your bandage  · Your stitches come apart  · You feel lightheaded, short of breath, and have chest pain  · You cough up blood  · Your arm or leg feels warm, tender, and painful  It may look swollen and red  © 2014 [address] is for End User's use only and may not be sold, redistributed or otherwise used for commercial purposes  All illustrations and images included in CareNotes® are the copyrighted property of A [employer] M , Inc  or Garrick Mario  The above information is an  only  It is not intended as medical advice for individual conditions or treatments  Talk to your doctor, nurse or pharmacist before following any medical regimen to see if it is safe and effective for you

## 2017-12-01 NOTE — OP NOTE
OPERATIVE REPORT  PATIENT NAME: Tim Iverson    :  1995  MRN: [id number]  Pt Location: [facility] L&D OR ROOM 02    SURGERY DATE: 2017    Surgeon(s) and Role:     * Benoit Estrada DO - Primary     * Va Tavarez MD - Oscar Clements MD - Assisting    Preop Diagnosis:  Fetal intolerance to labor, delivered, current hospitalization [O77 9]    Post-Op Diagnosis Codes:     * Fetal intolerance to labor, delivered, current hospitalization [O77 9]    Procedure(s) (LRB):   SECTION () (N/A)    Specimen(s):  ID Type Source Tests Collected by Time Destination   1 :  Tissue (Placenta on Hold) OB Only Placenta TISSUE EXAM OB (PLACENTA) ONLY Benoit Estrada DO 2017 2164    A :  Tissue (Placenta on Hold) OB Only Placenta PLACENTA IN STORAGE Benoit Estrada DO 2017 0329    B :  Cord Blood Cord BLOOD GAS, VENOUS, CORD, BLOOD GAS, ARTERIAL, CORD Benoit Estrada DO 2017 0329        Estimated Blood Loss:   700ml    Drains:  none       Anesthesia Type:   epidural    Operative Indications:  Fetal intolerance to labor, delivered, current hospitalization [O77 9]      Operative Findings:  2cm posterior uterine myoma  Nl tubes, ovaries    Complications:   None    Procedure and Technique:   Section Procedure Note      Pre-operative Diagnosis: 39 week 5 day pregnancy  nonreassuring FHT             Fetal malposition    Post-operative Diagnosis: same               Viable male                    Procedure(s) performed:  Primary LTCS    Surgeon: Benoit Estrada DO      Assistant(s): Meenu Ochoa    Findings:    Viable male weighing 6lbs 7oz;  Apgar scores of 8 at one minute and 9 at five minutes     clear amniotic fluid  Normal uterus(2cm posterior myoma), tubes, and ovaries  Placenta with infarcts with 3-vessel cord    Specimens: Arterial and venous cord gases, cord blood, segment of umbilical cord, placenta to pathologystorage     Estimated Blood Loss:  700 mL    Drains: none           Complications:  None; patient tolerated the procedure well  Disposition: PACU            Condition: stable    Procedure Details     Patient began to have decels late first stage and throughout second stage  Prolonged decels recurrent  Vertex at +1 and LOT  Patient taken to OR for STAT C/S  The patient was seen prior to the procedure  Risks, benefits, possible complications, alternate treatment options, and expected outcomes were discussed with the patient  The patient agreed with the proposed plan and gave informed consent  The patient was taken to [facility] Operating Room  She had already received appropriate epidural anesthesia     Betadine vaginal prep was accomplished  The abdomen was prepped with betadine  the patient was draped in the usual sterile manner  A Time Out was held and the above information confirmed  The patient was identified as Jana Burns and the procedure verified as  Delivery  The patient had received Ancef for prophylaxis  A Pfannenstiel incision was made and carried down through the underlying subcutaneous tissue to the fascia using a scalpel  Rectus fascia was then incised  We then proceeded in Jesus-Mcdonald fashion  All anatomic layers were well-demarcated  The rectus muscles were  and the peritoneum was identified, entered, and extended longitudinally with blunt dissection  The bladder blade was inserted  A low transverse uterine incision was made with the scalpel and extended laterally with blunt dissection  The amnion was entered bluntly  The fetal head was palpated, elevated, and delivered through the uterine incision followed by the body without difficulty  Baby had spontaneous cry with good color and tone  Delayed cord clamping was employed  The umbilical cord was clamped and cut  The infant was handed off to the  providers  Pitocin infusion was begun     Arterial and venous cord gases, cord blood, and a segment of umbilical cord were obtained for evaluation  The placenta delivered spontaneously with uterine fundal massage and appeared to have multiple infarcts  The uterus was exteriorized and curretted with a moist lap sponge  Uterus, tubes and ovaries appeared normal   There was a 2cm posterior myoma  The uterine incision was closed with a running locked suture of 0 Vicryl  A second layer of the same suture was used to imbricate the first   Bleeding was noted at right uterine vessels  This was controlled with a O'Harvey suture  Hemostasis was noted to be excellent  Normal saline solution was used to irrigate the posterior culdesac  The uterus was returned to the abdomen  The gutters were inspected and cleared of all clots and debris with irrigation and moist lap sponges  The fascia was closed with a running suture of 0 Vicryl  Subcutaneous tissues were closed with 0 Vicryl suture     The skin was closed with surgical steel staples  Sterile dressing was applied  An abdominal binder was then placed    ApH:7 282      BE:-4 5           VpH:7 327        BE:-5 8    The patient appeared to tolerate the procedure very well  Lap sponge, needle, and instrument counts were correct x2  The patient was transferred to her postpartum recovery room in stable condition and her infant went to the  nursery  Attending Attestation: I was present for the entire procedure              Patient Disposition:  PACU     SIGNATURE: Micheline Hill DO  DATE: 2017  TIME: 4:33 AM

## 2017-12-01 NOTE — DISCHARGE SUMMARY
Discharge Summary - Jana Burns 20 y o  female MRN: [id number]    Unit/Bed#: LD OR  Encounter: [id number]    Admission Date: 2017     Discharge Date: 2017    Admitting Diagnosis:   1  Pregnancy at 39w6d  2  Labor  3  SROM  4  GBS positive  5  Hx of HSV on Valtrex  6  Obesity  7  Anemia in pregnancy  8  Anxiety and Depression on Buspirone  9  BV and Yeast infection    Discharge Diagnosis: same, delivered    Procedures: primary  section, low transverse incision   Right UA ligation    Delivering Attending: Adrianna Jansen DO    Discharging Attending: Nicolette Chris Course: Jana Burns is a 20 y o  [id number] at 39w6d wks who was initially admitted for labor and SROM  She was initially 3-4/ /-3  She progressed rapidly to anterior lip which was reduced and was found to be complete and pushing  However, during labor course FHM demonstrated repeated category II strip that required  internalization and amnioinfusion along with one dose of terbutaline  During pushing, fetal head was noted to still be at 0/+1 station with decreased fetal heart tones  Decision was made to proceed with STAT primary  for non-reassuring fetal heart tones  She delivered a viable male  on 17 at   Weight 6lbs 7 3oz via primary  section, low transverse incision, right UA ligation  Apgars were 8 (1 min) and 9 (5 min)  Patient tolerated the procedure well and was transferred to recovery in stable condition  Her post-operative course was uncomplicated  Preoperative hemaglobin was 11 6, postoperative was 7 4  Her postoperative pain was well controlled with oral analgesics  On day of discharge, she was ambulating and able to reasonably perform all ADLs  She was voiding and had appropriate bowel function  Pain was well controlled  She was discharged home on post-operative day #3 without complications   Patient was instructed to follow up with her OB as an outpatient and was given appropriate warnings to call provider if she develops signs of infection or uncontrolled pain  Complications: none apparent    Condition at discharge: good     Discharge instructions/Information to patient and family:   See after visit summary for information provided to patient and family  Provisions for Follow-Up Care:  See after visit summary for information related to follow-up care and any pertinent home health orders        Disposition: HOME    Planned Readmission: Kassi Rueda  12/4/2017  6:53 AM

## 2017-12-01 NOTE — ANESTHESIA PROCEDURE NOTES
CSE Block    Patient location during procedure: OB  Start time: 12/1/2017 1:44 AM  Reason for block: procedure for pain and at surgeon's request  Staffing  Anesthesiologist: Edwin Cohn  Performed: anesthesiologist   Preanesthetic Checklist  Completed: patient identified, site marked, surgical consent, pre-op evaluation, timeout performed, IV checked, risks and benefits discussed and monitors and equipment checked  CSE  Patient position: sitting  Prep: ChloraPrep  Patient monitoring: cardiac monitor and continuous pulse ox  Approach: midline  Spinal Needle  Needle type: pencil-tip   Needle gauge: 27 G  Needle length: 10 cm  Epidural Needle  Injection technique: GORDO saline  Needle type: Tuohy   Needle gauge: 18 G  Location: lumbar (1-5)  Catheter  Catheter type: side hole  Catheter size: 20 G  Catheter at skin depth: 12 cm  Test dose: negative  AssessmentInjection Assessment:  negative aspiration for heme, no paresthesia on injection, positive aspiration for clear CSF and no pain on injection

## 2017-12-01 NOTE — ANESTHESIA PREPROCEDURE EVALUATION
Review of Systems/Medical History  Patient summary reviewed  Chart reviewed      Cardiovascular  Negative cardio ROS    Pulmonary  Negative pulmonary ROS ,        GI/Hepatic  Negative GI/hepatic ROS          Negative  ROS        Endo/Other  Negative endo/other ROS      GYN  Currently pregnant , Prior pregnancy/OB history : 4 Parity: 1,          Hematology  Negative hematology ROS      Musculoskeletal  Obesity ,        Neurology  Negative neurology ROS      Psychology   Anxiety, Depression ,            Physical Exam    Airway    Mallampati score: II  TM Distance: >3 FB  Neck ROM: full     Dental   No notable dental hx     Cardiovascular  Comment: Negative ROS, Rhythm: regular, Rate: normal, Cardiovascular exam normal    Pulmonary  Pulmonary exam normal Breath sounds clear to auscultation,     Other Findings        Anesthesia Plan  ASA Score- 2       Anesthesia Type- epidural and spinal with ASA Monitors  Additional Monitors:   Airway Plan:           Induction-       Informed Consent- Anesthetic plan and risks discussed with patient  Recent labs personally reviewed:  Lab Results   Component Value Date    WBC 10 21 (H) 2017    HGB 11 6 2017     2017     No results found for: NA, K, BUN, CREATININE, GLUCOSE  No results found for: PTT   No results found for: INR    Blood type     No results found for: Sandoval Yo MD, have personally seen and evaluated the patient prior to anesthetic care  I have reviewed the pre-anesthetic record, and other medical records if appropriate to the anesthetic care  If a CRNA is involved in the case, I have reviewed the CRNA assessment, if present, and agree  Risks/benefits and alternatives discussed with patient including possible PONV, sore throat, and possibility of rare anesthetic and surgical emergencies

## 2017-12-02 LAB
BASOPHILS # BLD AUTO: 0.02 THOUSANDS/ΜL (ref 0–0.1)
BASOPHILS NFR BLD AUTO: 0 % (ref 0–1)
EOSINOPHIL # BLD AUTO: 0.13 THOUSAND/ΜL (ref 0–0.61)
EOSINOPHIL NFR BLD AUTO: 1 % (ref 0–6)
ERYTHROCYTE [DISTWIDTH] IN BLOOD BY AUTOMATED COUNT: 16 % (ref 11.6–15.1)
ERYTHROCYTE [DISTWIDTH] IN BLOOD BY AUTOMATED COUNT: 16 % (ref 11.6–15.1)
HCT VFR BLD AUTO: 22.9 % (ref 34.8–46.1)
HCT VFR BLD AUTO: 22.9 % (ref 34.8–46.1)
HGB BLD-MCNC: 7.3 G/DL (ref 11.5–15.4)
HGB BLD-MCNC: 7.4 G/DL (ref 11.5–15.4)
LYMPHOCYTES # BLD AUTO: 2.18 THOUSANDS/ΜL (ref 0.6–4.47)
LYMPHOCYTES NFR BLD AUTO: 18 % (ref 14–44)
MCH RBC QN AUTO: 25.3 PG (ref 26.8–34.3)
MCH RBC QN AUTO: 26 PG (ref 26.8–34.3)
MCHC RBC AUTO-ENTMCNC: 31.9 G/DL (ref 31.4–37.4)
MCHC RBC AUTO-ENTMCNC: 32.3 G/DL (ref 31.4–37.4)
MCV RBC AUTO: 80 FL (ref 82–98)
MCV RBC AUTO: 80 FL (ref 82–98)
MONOCYTES # BLD AUTO: 1.26 THOUSAND/ΜL (ref 0.17–1.22)
MONOCYTES NFR BLD AUTO: 10 % (ref 4–12)
NEUTROPHILS # BLD AUTO: 8.72 THOUSANDS/ΜL (ref 1.85–7.62)
NEUTS SEG NFR BLD AUTO: 71 % (ref 43–75)
NRBC BLD AUTO-RTO: 0 /100 WBCS
PLATELET # BLD AUTO: 198 THOUSANDS/UL (ref 149–390)
PLATELET # BLD AUTO: 212 THOUSANDS/UL (ref 149–390)
PMV BLD AUTO: 9.4 FL (ref 8.9–12.7)
PMV BLD AUTO: 9.8 FL (ref 8.9–12.7)
RBC # BLD AUTO: 2.85 MILLION/UL (ref 3.81–5.12)
RBC # BLD AUTO: 2.88 MILLION/UL (ref 3.81–5.12)
WBC # BLD AUTO: 11.77 THOUSAND/UL (ref 4.31–10.16)
WBC # BLD AUTO: 12.34 THOUSAND/UL (ref 4.31–10.16)

## 2017-12-02 PROCEDURE — 85027 COMPLETE CBC AUTOMATED: CPT | Performed by: OBSTETRICS & GYNECOLOGY

## 2017-12-02 PROCEDURE — 85025 COMPLETE CBC W/AUTO DIFF WBC: CPT | Performed by: OBSTETRICS & GYNECOLOGY

## 2017-12-02 RX ORDER — KETOROLAC TROMETHAMINE 30 MG/ML
30 INJECTION, SOLUTION INTRAMUSCULAR; INTRAVENOUS EVERY 6 HOURS
Status: COMPLETED | OUTPATIENT
Start: 2017-12-02 | End: 2017-12-03

## 2017-12-02 RX ORDER — SODIUM CHLORIDE, SODIUM LACTATE, POTASSIUM CHLORIDE, CALCIUM CHLORIDE 600; 310; 30; 20 MG/100ML; MG/100ML; MG/100ML; MG/100ML
125 INJECTION, SOLUTION INTRAVENOUS CONTINUOUS
Status: DISCONTINUED | OUTPATIENT
Start: 2017-12-02 | End: 2017-12-03

## 2017-12-02 RX ORDER — SODIUM CHLORIDE, SODIUM LACTATE, POTASSIUM CHLORIDE, CALCIUM CHLORIDE 600; 310; 30; 20 MG/100ML; MG/100ML; MG/100ML; MG/100ML
125 INJECTION, SOLUTION INTRAVENOUS CONTINUOUS
Status: DISCONTINUED | OUTPATIENT
Start: 2017-12-02 | End: 2017-12-02

## 2017-12-02 RX ORDER — METOCLOPRAMIDE 10 MG/1
10 TABLET ORAL
Status: DISCONTINUED | OUTPATIENT
Start: 2017-12-02 | End: 2017-12-04 | Stop reason: HOSPADM

## 2017-12-02 RX ORDER — OXYCODONE HYDROCHLORIDE AND ACETAMINOPHEN 5; 325 MG/1; MG/1
2 TABLET ORAL EVERY 4 HOURS PRN
Status: DISCONTINUED | OUTPATIENT
Start: 2017-12-02 | End: 2017-12-02 | Stop reason: SDUPTHER

## 2017-12-02 RX ADMIN — OXYCODONE HYDROCHLORIDE AND ACETAMINOPHEN 1 TABLET: 5; 325 TABLET ORAL at 15:32

## 2017-12-02 RX ADMIN — METRONIDAZOLE 500 MG: 500 TABLET ORAL at 15:31

## 2017-12-02 RX ADMIN — DOCUSATE SODIUM 100 MG: 100 CAPSULE, LIQUID FILLED ORAL at 17:52

## 2017-12-02 RX ADMIN — SODIUM CHLORIDE, SODIUM LACTATE, POTASSIUM CHLORIDE, AND CALCIUM CHLORIDE 125 ML/HR: .6; .31; .03; .02 INJECTION, SOLUTION INTRAVENOUS at 11:15

## 2017-12-02 RX ADMIN — SODIUM CHLORIDE, SODIUM LACTATE, POTASSIUM CHLORIDE, AND CALCIUM CHLORIDE 125 ML/HR: .6; .31; .03; .02 INJECTION, SOLUTION INTRAVENOUS at 22:46

## 2017-12-02 RX ADMIN — DOCUSATE SODIUM 100 MG: 100 CAPSULE, LIQUID FILLED ORAL at 09:09

## 2017-12-02 RX ADMIN — METRONIDAZOLE 500 MG: 500 TABLET ORAL at 04:17

## 2017-12-02 RX ADMIN — KETOROLAC TROMETHAMINE 30 MG: 30 INJECTION, SOLUTION INTRAMUSCULAR at 17:52

## 2017-12-02 RX ADMIN — HYDROMORPHONE HYDROCHLORIDE 0.5 MG: 1 INJECTION, SOLUTION INTRAMUSCULAR; INTRAVENOUS; SUBCUTANEOUS at 01:06

## 2017-12-02 RX ADMIN — METOCLOPRAMIDE HYDROCHLORIDE 10 MG: 10 TABLET ORAL at 06:05

## 2017-12-02 RX ADMIN — KETOROLAC TROMETHAMINE 30 MG: 30 INJECTION, SOLUTION INTRAMUSCULAR at 11:14

## 2017-12-02 RX ADMIN — OXYCODONE HYDROCHLORIDE AND ACETAMINOPHEN 2 TABLET: 5; 325 TABLET ORAL at 04:17

## 2017-12-02 RX ADMIN — METOCLOPRAMIDE HYDROCHLORIDE 10 MG: 10 TABLET ORAL at 11:14

## 2017-12-02 RX ADMIN — OXYCODONE HYDROCHLORIDE AND ACETAMINOPHEN 1 TABLET: 5; 325 TABLET ORAL at 22:59

## 2017-12-02 RX ADMIN — KETOROLAC TROMETHAMINE 30 MG: 30 INJECTION, SOLUTION INTRAMUSCULAR at 06:04

## 2017-12-02 RX ADMIN — METOCLOPRAMIDE HYDROCHLORIDE 10 MG: 10 TABLET ORAL at 15:31

## 2017-12-02 NOTE — SOCIAL WORK
CM received JOSEPH consult for breast pump for pt  Script uploaded and referral made to [facility] via [address] for Ameda breast pump  Pump delivered to pt's room

## 2017-12-02 NOTE — PROGRESS NOTES
Postpartum Progress Note - OB/GYN   Leif Iverson 20 y o  female MRN: [id number]  Unit/Bed#:  320-01 Encounter: [id number]    ASSESSMENT:  Leif Iverson 20 y o  [id number] s/p Primary low transverse  section post-operative day 1  Pt is well appearing and with no current complaints  Reported gas pain yesterday that has since resolved  PLAN:  1) Continue routine postpartum care  2) Encourage ambulation  3) Pain control as needed  4) Advance diet as tolerated  5) Dispo: stable and comfortable    Subjective/Objective     SUBJECTIVE:  Pain: no  Tolerating Oral Intake: yes   Voiding: yes  Flatus: yes  Bowel Movement: no  Ambulating: yes  Breastfeeding: yes  Chest Pain: no  Shortness of Breath: no  Leg Pain/Discomfort: no  Lochia: minimal    OBJECTIVE:     Vitals: Blood pressure 99/51, pulse 93, temperature 98 1 °F (36 7 °C), temperature source Oral, resp  rate 18, height 5' 2" (1 575 m), weight 84 8 kg (187 lb), SpO2 98 %, not currently breastfeeding       General: appears well, alert and oriented x 3, pleasant and cooperative  Cardiovascular: RRR, normal S1/S2, no GRM  Lungs:  clear to auscultation bilaterally; no WRR, non-labored breathing   Abdomen: normal bowel sounds, soft, no tenderness, no distention  : Uterine fundus firm: -2 cm below the umbilicus  Incision: Clean, dry, and intact, closed with staples, no erythema/fluctueance appreciated  Lower Extremities: Non-tender, peripheral pulses normal; no clubbing, cyanosis, or edema    Labs:   Lab Results   Component Value Date    WBC 10 21 (H) 2017    HGB 11 6 2017    HCT 36 2 2017    MCV 79 (L) 2017     2017       Medications:   Current Facility-Administered Medications   Medication Dose Route Frequency    acetaminophen (TYLENOL) tablet 650 mg  650 mg Oral Q6H PRN    benzocaine-menthol-lanolin-aloe (DERMOPLAST) 20-0 5 % topical spray 1 application  1 application Topical G0R PRN    diphenhydrAMINE (BENADRYL) injection 25 mg  25 mg Intravenous Q6H PRN    diphenhydrAMINE (BENADRYL) tablet 25 mg  25 mg Oral Q6H PRN    docusate sodium (COLACE) capsule 100 mg  100 mg Oral BID    hydrocortisone 1 % cream 1 application  1 application Topical Daily PRN    ibuprofen (MOTRIN) tablet 600 mg  600 mg Oral Q6H PRN    ketorolac (TORADOL) 30 mg/mL injection 30 mg  30 mg Intravenous Q6H    lactated ringers infusion  125 mL/hr Intravenous Continuous    metoclopramide (REGLAN) tablet 10 mg  10 mg Oral TID AC    metroNIDAZOLE (FLAGYL) tablet 500 mg  500 mg Oral Q12H [address]    nalbuphine (NUBAIN) injection 10 mg  10 mg Intravenous Q3H PRN    ondansetron (ZOFRAN) injection 4 mg  4 mg Intravenous Q8H PRN    oxyCODONE-acetaminophen (PERCOCET) 5-325 mg per tablet 1 tablet  1 tablet Oral Q4H PRN    oxyCODONE-acetaminophen (PERCOCET) 5-325 mg per tablet 2 tablet  2 tablet Oral Q4H PRN    simethicone (MYLICON) chewable tablet 80 mg  80 mg Oral 4x Daily PRN    witch hazel-glycerin (TUCKS) topical pad 1 pad  1 pad Topical Q4H PRN     Invasive Devices     Peripheral Intravenous Line            Peripheral IV 12/01/17 Left Hand 1 day                     Otoniel Hayes MD PGY-2   12/2/2017 7:05 AM

## 2017-12-03 RX ORDER — SIMETHICONE 80 MG
80 TABLET,CHEWABLE ORAL 4 TIMES DAILY
Status: DISCONTINUED | OUTPATIENT
Start: 2017-12-03 | End: 2017-12-04 | Stop reason: HOSPADM

## 2017-12-03 RX ADMIN — OXYCODONE HYDROCHLORIDE AND ACETAMINOPHEN 1 TABLET: 5; 325 TABLET ORAL at 02:47

## 2017-12-03 RX ADMIN — METRONIDAZOLE 500 MG: 500 TABLET ORAL at 07:33

## 2017-12-03 RX ADMIN — METOCLOPRAMIDE HYDROCHLORIDE 10 MG: 10 TABLET ORAL at 17:11

## 2017-12-03 RX ADMIN — METOCLOPRAMIDE HYDROCHLORIDE 10 MG: 10 TABLET ORAL at 07:33

## 2017-12-03 RX ADMIN — KETOROLAC TROMETHAMINE 30 MG: 30 INJECTION, SOLUTION INTRAMUSCULAR at 02:36

## 2017-12-03 RX ADMIN — METOCLOPRAMIDE HYDROCHLORIDE 10 MG: 10 TABLET ORAL at 12:59

## 2017-12-03 RX ADMIN — METRONIDAZOLE 500 MG: 500 TABLET ORAL at 18:02

## 2017-12-03 RX ADMIN — ACETAMINOPHEN 650 MG: 325 TABLET, FILM COATED ORAL at 07:33

## 2017-12-03 RX ADMIN — OXYCODONE HYDROCHLORIDE AND ACETAMINOPHEN 2 TABLET: 5; 325 TABLET ORAL at 15:28

## 2017-12-03 RX ADMIN — DOCUSATE SODIUM 100 MG: 100 CAPSULE, LIQUID FILLED ORAL at 10:10

## 2017-12-03 RX ADMIN — WITCH HAZEL 1 PAD: 500 SOLUTION RECTAL; TOPICAL at 10:17

## 2017-12-03 RX ADMIN — OXYCODONE HYDROCHLORIDE AND ACETAMINOPHEN 2 TABLET: 5; 325 TABLET ORAL at 21:43

## 2017-12-03 RX ADMIN — DOCUSATE SODIUM 100 MG: 100 CAPSULE, LIQUID FILLED ORAL at 17:11

## 2017-12-03 RX ADMIN — IBUPROFEN 600 MG: 600 TABLET ORAL at 10:12

## 2017-12-03 RX ADMIN — HYDROCORTISONE 1 APPLICATION: 1 CREAM TOPICAL at 10:18

## 2017-12-03 NOTE — LACTATION NOTE
This note was copied from a baby's chart  Mom states she plans to just pump and feed by bottle nipple  Is currently feeding infant formula  States she only pumped a few times and got a few drops  Discussed need for frequent milk removal to establish and maintain supply  Encouraged to pump more frequently  Encouraged to call for additional assistance as needed

## 2017-12-03 NOTE — PROGRESS NOTES
Progress Note - OB/GYN   Berenice Iverson 20 y o  female MRN: [id number]  Unit/Bed#:  320-01 Encounter: [id number]    Assessment:   Post Op Day #2 s/p 1LTCS + R  UA ligation, stable    Plan:  1  Continue routine post partum care   -Encourage ambulation   -Encourage breastfeeding  2  Gas pain   -Encourage patient to continue taking simethicone and pain med as scheduled  -Encourage ambulation  3  Anemia stable    -7 3 -> 7 4   -VSS, asymptomatic      Subjective/Objective   Chief Complaint:     Post delivery, patient is stating she has gas pain again  She has been only taking one percocet because she does not like the feeling of percocet  She has not been ambulating, because she was told she had anemia and couldn't walk around  Subjective:     Pain: yes, cramping, improved with meds  Tolerating PO: yes  Voiding: yes  Flatus: yes  BM: no  Ambulating: yes  Breastfeeding:  Yes  Chest pain: no  Shortness of breath: no  Leg pain: no  Lochia: minimal    Objective:     Vitals: /63   Pulse 102   Temp 98 9 °F (37 2 °C) (Oral)   Resp 18   Ht 5' 2" (1 575 m)   Wt 84 8 kg (187 lb)   LMP  (LMP Unknown)   SpO2 98%   Breastfeeding?  No   BMI 34 20 kg/m²       Intake/Output Summary (Last 24 hours) at 12/03/17 0652  Last data filed at 12/03/17 0243   Gross per 24 hour   Intake             1400 ml   Output                0 ml   Net             1400 ml       Lab Results   Component Value Date    WBC 12 34 (H) 12/02/2017    HGB 7 4 (L) 12/02/2017    HCT 22 9 (L) 12/02/2017    MCV 80 (L) 12/02/2017     12/02/2017       Current Facility-Administered Medications   Medication Dose Route Frequency    acetaminophen (TYLENOL) tablet 650 mg  650 mg Oral Q6H PRN    benzocaine-menthol-lanolin-aloe (DERMOPLAST) 20-0 5 % topical spray 1 application  1 application Topical N5H PRN    diphenhydrAMINE (BENADRYL) injection 25 mg  25 mg Intravenous Q6H PRN    diphenhydrAMINE (BENADRYL) tablet 25 mg  25 mg Oral Q6H PRN    docusate sodium (COLACE) capsule 100 mg  100 mg Oral BID    hydrocortisone 1 % cream 1 application  1 application Topical Daily PRN    ibuprofen (MOTRIN) tablet 600 mg  600 mg Oral Q6H PRN    metoclopramide (REGLAN) tablet 10 mg  10 mg Oral TID AC    metroNIDAZOLE (FLAGYL) tablet 500 mg  500 mg Oral Q12H [address]    nalbuphine (NUBAIN) injection 10 mg  10 mg Intravenous Q3H PRN    ondansetron (ZOFRAN) injection 4 mg  4 mg Intravenous Q8H PRN    oxyCODONE-acetaminophen (PERCOCET) 5-325 mg per tablet 1 tablet  1 tablet Oral Q4H PRN    oxyCODONE-acetaminophen (PERCOCET) 5-325 mg per tablet 2 tablet  2 tablet Oral Q4H PRN    simethicone (MYLICON) chewable tablet 80 mg  80 mg Oral 4x Daily PRN    witch hazel-glycerin (TUCKS) topical pad 1 pad  1 pad Topical Q4H PRN       Physical Exam:     Gen: AAOx3, NAD  CV: RRR  Lungs: CTA b/l  Abd: Soft, non-tender, distended, no rebound or guarding  Uterine fundus firm and non-tender, at umbilicus, incisions are c/d/i  Ext: Non tender    Antonio Callahan, PGY-1  OB/GYN  12/3/2017  6:52 AM

## 2017-12-03 NOTE — PROGRESS NOTES
Recent Results (from the past 6 hour(s))   CBC and differential    Collection Time: 12/02/17  5:53 PM   Result Value Ref Range    WBC 12 34 (H) 4 31 - 10 16 Thousand/uL    RBC 2 85 (L) 3 81 - 5 12 Million/uL    Hemoglobin 7 4 (L) 11 5 - 15 4 g/dL    Hematocrit 22 9 (L) 34 8 - 46 1 %    MCV 80 (L) 82 - 98 fL    MCH 26 0 (L) 26 8 - 34 3 pg    MCHC 32 3 31 4 - 37 4 g/dL    RDW 16 0 (H) 11 6 - 15 1 %    MPV 9 8 8 9 - 12 7 fL    Platelets 592 397 - 876 Thousands/uL    nRBC 0 /100 WBCs    Neutrophils Relative 71 43 - 75 %    Lymphocytes Relative 18 14 - 44 %    Monocytes Relative 10 4 - 12 %    Eosinophils Relative 1 0 - 6 %    Basophils Relative 0 0 - 1 %    Neutrophils Absolute 8 72 (H) 1 85 - 7 62 Thousands/µL    Lymphocytes Absolute 2 18 0 60 - 4 47 Thousands/µL    Monocytes Absolute 1 26 (H) 0 17 - 1 22 Thousand/µL    Eosinophils Absolute 0 13 0 00 - 0 61 Thousand/µL    Basophils Absolute 0 02 0 00 - 0 10 Thousands/µL         Lab revewed  HGB stable  Continue with multivitamin and iron

## 2017-12-04 VITALS
WEIGHT: 187 LBS | HEIGHT: 62 IN | BODY MASS INDEX: 34.41 KG/M2 | TEMPERATURE: 98.4 F | HEART RATE: 100 BPM | DIASTOLIC BLOOD PRESSURE: 78 MMHG | OXYGEN SATURATION: 98 % | SYSTOLIC BLOOD PRESSURE: 132 MMHG | RESPIRATION RATE: 20 BRPM

## 2017-12-04 RX ORDER — DIAPER,BRIEF,INFANT-TODD,DISP
1 EACH MISCELLANEOUS DAILY PRN
Qty: 30 G | Refills: 0
Start: 2017-12-04 | End: 2018-04-25 | Stop reason: ALTCHOICE

## 2017-12-04 RX ORDER — OXYCODONE HYDROCHLORIDE AND ACETAMINOPHEN 5; 325 MG/1; MG/1
2 TABLET ORAL EVERY 4 HOURS PRN
Refills: 0
Start: 2017-12-04 | End: 2017-12-04

## 2017-12-04 RX ORDER — DOCUSATE SODIUM 100 MG/1
100 CAPSULE, LIQUID FILLED ORAL 2 TIMES DAILY
Qty: 10 CAPSULE | Refills: 0
Start: 2017-12-04 | End: 2018-04-25 | Stop reason: ALTCHOICE

## 2017-12-04 RX ORDER — IBUPROFEN 600 MG/1
600 TABLET ORAL EVERY 6 HOURS PRN
Qty: 30 TABLET | Refills: 0
Start: 2017-12-04 | End: 2018-04-25 | Stop reason: ALTCHOICE

## 2017-12-04 RX ORDER — OXYCODONE HYDROCHLORIDE AND ACETAMINOPHEN 5; 325 MG/1; MG/1
2 TABLET ORAL EVERY 4 HOURS PRN
Qty: 20 TABLET | Refills: 0 | Status: SHIPPED | OUTPATIENT
Start: 2017-12-04 | End: 2017-12-14

## 2017-12-04 RX ORDER — ACETAMINOPHEN 325 MG/1
TABLET ORAL
Qty: 30 TABLET | Refills: 0
Start: 2017-12-04 | End: 2017-12-04 | Stop reason: HOSPADM

## 2017-12-04 RX ADMIN — METOCLOPRAMIDE HYDROCHLORIDE 10 MG: 10 TABLET ORAL at 11:20

## 2017-12-04 RX ADMIN — OXYCODONE HYDROCHLORIDE AND ACETAMINOPHEN 2 TABLET: 5; 325 TABLET ORAL at 06:07

## 2017-12-04 RX ADMIN — METOCLOPRAMIDE HYDROCHLORIDE 10 MG: 10 TABLET ORAL at 07:33

## 2017-12-04 RX ADMIN — ACETAMINOPHEN 650 MG: 325 TABLET, FILM COATED ORAL at 15:37

## 2017-12-04 RX ADMIN — METRONIDAZOLE 500 MG: 500 TABLET ORAL at 07:33

## 2017-12-04 RX ADMIN — OXYCODONE HYDROCHLORIDE AND ACETAMINOPHEN 1 TABLET: 5; 325 TABLET ORAL at 11:21

## 2017-12-04 RX ADMIN — DOCUSATE SODIUM 100 MG: 100 CAPSULE, LIQUID FILLED ORAL at 09:10

## 2017-12-04 RX ADMIN — METOCLOPRAMIDE HYDROCHLORIDE 10 MG: 10 TABLET ORAL at 15:38

## 2017-12-04 NOTE — CASE MANAGEMENT
Notification of Maternity Inpatient Admission/Maternity Inpatient Authorization Request  This is a Notification of Maternity Inpatient Admission/Maternity Inpatient Authorization Request to our facility [facility]  Please be advised that this patient is currently in our facility under Inpatient Status  Below you will find the Birth/ Summary, Attending Physician and Facilitys information including NPI# and contact for the Utilization  assigned to the [facility] where the patient is receiving services  Please feel free to contact the Utilization Review Department with any questions  Mothers Information:  Stuart Ayon  MRN: [id number]  YOB: 1995  Admission Date: 2017 12:09 AM  Discharge Date: 2017  4:18 PM  Disposition: Home/Self Care  Admitting Diagnosis:   O82  DELIVERY  Other uterine inertia [O62 2]  39 weeks gestation of pregnancy [Z3A 39]  [place] Information:  Estimated Date of Delivery: 17  Information for the patient's :  Radha [place] [[id number]]      Delivery Information:  Sex: male  Delivered 2017 3:29 AM by , Low Transverse; Gestational Age: 37w11d     Measurements:  Weight: 6 lb 7 4 oz (2930 g); Height: 19"    APGAR 1 minute 5 minutes 10 minutes   Totals: 8 9      OB History      Para Term  AB Living    4 2 2 0 2 2    SAB TAB Ectopic Multiple Live Births    1 1   0 2        Attending Physician:  MOISES Goodson    Specialty- Obstetrics and Gynecology  [facility] ID- [id number]  [address]  Phone 1: [phone]  Fax: [phone]      [place] [address] [place] [address]  [address]  [phone]  Tax ID: [id number]  NPI: [id number]    [facility] in the [employer] by [employer] for 2017  Network Utilization Review Department  Phone: [phone]; Fax [phone]  ATTENTION: The Network Utilization Review Department is now centralized for our 7 Facilities  Make a note that we have a new phone and fax numbers for our Department  Please call with any questions or concerns to [phone] and carefully follow the prompts so that you are directed to the right person  All voicemails are confidential  Fax any determinations, approvals, denials, and requests for initial or continue stay review clinical to [phone]  Due to HIGH CALL volume, it would be easier if you could please send faxed requests to expedite your requests and in part, help us provide discharge notifications faster

## 2017-12-04 NOTE — PROGRESS NOTES
Progress Note - OB/GYN   Valery Iverson 20 y o  female MRN: [id number]  Unit/Bed#:  320-01 Encounter: [id number]    Assessment:   Post Op Day #3 s/p 1LTCS + R  UA ligation, stable    Plan:  1  Continue routine post partum care   -Encourage ambulation   -Encourage breastfeeding  2  Gas pain   -improved today, was able to pass more gas yesterday   -Encourage patient to continue taking simethicone and pain med as scheduled  -Encourage ambulation     3  Anemia stable    -7 3 -> 7 4   -VSS, asymptomatic    4  Anticipate discharge today      Subjective/Objective   Chief Complaint:     Post delivery, patient is stating she had improvement with gas pain and has been able to pass more gas yesterday, but no bowel movement  Subjective:     Pain: yes, cramping, improved with meds  Tolerating PO: yes  Voiding: yes  Flatus: yes  BM: no  Ambulating: yes  Breastfeeding:  Yes  Chest pain: no  Shortness of breath: no  Leg pain: no  Lochia: minimal    Objective:     Vitals: /63   Pulse 95   Temp 98 5 °F (36 9 °C) (Oral)   Resp 18   Ht 5' 2" (1 575 m)   Wt 84 8 kg (187 lb)   LMP  (LMP Unknown)   SpO2 98%   Breastfeeding?  Yes Comment: breast and bottle  BMI 34 20 kg/m²     No intake or output data in the 24 hours ending 12/04/17 0647    Lab Results   Component Value Date    WBC 12 34 (H) 12/02/2017    HGB 7 4 (L) 12/02/2017    HCT 22 9 (L) 12/02/2017    MCV 80 (L) 12/02/2017     12/02/2017       Current Facility-Administered Medications   Medication Dose Route Frequency    acetaminophen (TYLENOL) tablet 650 mg  650 mg Oral Q6H PRN    benzocaine-menthol-lanolin-aloe (DERMOPLAST) 20-0 5 % topical spray 1 application  1 application Topical F5L PRN    diphenhydrAMINE (BENADRYL) injection 25 mg  25 mg Intravenous Q6H PRN    diphenhydrAMINE (BENADRYL) tablet 25 mg  25 mg Oral Q6H PRN    docusate sodium (COLACE) capsule 100 mg  100 mg Oral BID    hydrocortisone 1 % cream 1 application  1 application Topical Daily PRN    ibuprofen (MOTRIN) tablet 600 mg  600 mg Oral Q6H PRN    metoclopramide (REGLAN) tablet 10 mg  10 mg Oral TID AC    metroNIDAZOLE (FLAGYL) tablet 500 mg  500 mg Oral Q12H [address]    nalbuphine (NUBAIN) injection 10 mg  10 mg Intravenous Q3H PRN    ondansetron (ZOFRAN) injection 4 mg  4 mg Intravenous Q8H PRN    oxyCODONE-acetaminophen (PERCOCET) 5-325 mg per tablet 1 tablet  1 tablet Oral Q4H PRN    oxyCODONE-acetaminophen (PERCOCET) 5-325 mg per tablet 2 tablet  2 tablet Oral Q4H PRN    simethicone (MYLICON) chewable tablet 80 mg  80 mg Oral 4x Daily    witch hazel-glycerin (TUCKS) topical pad 1 pad  1 pad Topical Q4H PRN       Physical Exam:     Gen: AAOx3, NAD  CV: RRR  Lungs: CTA b/l  Abd: Soft, non-tender, distended, no rebound or guarding  Uterine fundus firm and non-tender, 1 cm below umbilicus, incisions are c/d/i  Ext: Non tender    Randa Garces PGY-1  OB/GYN  12/4/2017  6:47 AM

## 2017-12-04 NOTE — LACTATION NOTE
This note was copied from a baby's chart  Met with mother to go over feeding log since birth for the first week  Emphasized 8 or more (12) feedings in a 24 hour period, what to expect for the number of diapers per day of life and the progression of properties of the  stooling pattern  Discussed s/s that breastfeeding is going well after day 4 and when to get help from a pediatrician or lactation support person after day 4  Booklet included Breast Pumping Instructions, When You Go Back to Work or School, and Breastfeeding Resources for after discharge including access to the number for the [employer]  Discussed risks for early supplementation: over feeding, longer digestion times, engorgement for mom, lower milk supply for mom, and nipple confusion  Benefits of breast feeding for infant's intestinal tract, less engorgement for mom, protection from multiple disease processes as infant develops, avoidance of over feeding for infant, less nipple confusion, and increased health benefits for mom  Instructions given on pumping  Discussed when to start, frequency, different pumps available versus manual expression  Discussed labeling of milk, storage, and preparation of stored milk  Talked with MOB about paced feeding along with hand out on positioning and rational to assist infant making transition back and forth between breast and bottle feeding more effective  Encouraged MOB to call for assistance, questions, and concerns about breastfeeding  Extension provided

## 2017-12-08 ENCOUNTER — ALLSCRIPTS OFFICE VISIT (OUTPATIENT)
Dept: OTHER | Facility: OTHER | Age: 22
End: 2017-12-08

## 2017-12-08 ENCOUNTER — GENERIC CONVERSION - ENCOUNTER (OUTPATIENT)
Dept: OTHER | Facility: OTHER | Age: 22
End: 2017-12-08

## 2017-12-19 ENCOUNTER — ALLSCRIPTS OFFICE VISIT (OUTPATIENT)
Dept: OTHER | Facility: OTHER | Age: 22
End: 2017-12-19

## 2018-01-05 ENCOUNTER — ALLSCRIPTS OFFICE VISIT (OUTPATIENT)
Dept: OTHER | Facility: OTHER | Age: 23
End: 2018-01-05
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
## 2018-01-09 NOTE — MISCELLANEOUS
Message  Patient called, she says she was unable to tolerate the Flinstones we recommended she try, made her vomit  She says the doctor she just saw told her to just not take it if she can't tolerate it  I told her try and add the iron rich foods that we gave her in to her diet daily  Active Problems    1  Anemia complicating pregnancy (875 62,363 8) (O99 019)   2  Anxiety (300 00) (F41 9)   3  Bacterial vaginosis (616 10,041 9) (N76 0,B96 89)   4  Cerebral concussion (850 9) (S06 0X9A)   5  Cervical strain (847 0) (S16 1XXA)   6  Currently pregnant (V22 2) (Z34 90)   7  Depression (311) (F32 9)   8  Encounter for supervision of other normal pregnancy in third trimester (V22 1) (Z34 83)   9  Head injury (959 01) (S09 90XA)   10  Herpes exposure (V01 79) (Z20 828)   11  History of recent travel (V49 89) (Z78 9)   12  Maternal obesity, antepartum, second trimester (649 13,278 00) (T75 520)   13  Migrainous headache without aura (346 10) (G43 009)   14  Need for Tdap vaccination (V06 1) (Z23)   15  Pregnancy, obstetrical care (V22 1) (Z34 90)   16  Work related injury (959 9) (Y99 0)    Current Meds   1  BusPIRone HCl - 10 MG Oral Tablet; TAKE 1 TABLET 3 TIMES DAILY; Therapy: 93EVE0179 to (Evaluate:57Fua1134)  Requested for: 93FQF7666; Last   Rx:04Xcn8655 Ordered   2  Ferrous Sulfate 325 (65 Fe) MG Oral Tablet; Take 1 tab daily before bedtime on an   empty stomach or with orange juice; Therapy: 83HUS6625 to (60 124 37 75)  Requested for: 84BFX8071; Last   Rx:06Oct2017 Ordered   3  Prenatal Vitamins TABS; Take 1 tablet daily; Therapy: (Recorded:93Tzh0758) to Recorded   4  Probiotic CAPS; Therapy: (Recorded:45Koy5131) to Recorded   5  Terconazole 0 4 % Vaginal Cream (Terazol 7); INSERT 1 APPLICATORFUL   INTRAVAGINALLY AT BEDTIME; Therapy: 57YZO2309 to (Evaluate:13Oct2017)  Requested for: 14LRY2175; Last   Rx:24Npk3996 Ordered   6   Tylenol Extra Strength 500 MG Oral Tablet; TAKE 3 TABLET Daily PRN;   Therapy: (Recorded:17Mar2017) to Recorded   7  ValACYclovir HCl - 500 MG Oral Tablet (Valtrex); TAKE 1 TABLET Twice daily for 3 days   for treatment of recurrent infection; Therapy: 84UHQ7888 to (Evaluate:94Nvt0021)  Requested for: 24Png8235; Last   Rx:00Wgy0150 Ordered    Allergies    1  No Known Drug Allergies    2  Pollen   3  Seasonal  Denied    4   Flagyl TABS    Signatures   Electronically signed by : Karli Nina, ; Oct 30 2017 11:42AM EST                       (Author)
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
## 2018-01-10 NOTE — PROGRESS NOTES
AUG 4 2017         RE: Yuridia Iverson                                 To: [facility]  MR#: [id number]                                  [address]   : [address]                                  [address]   ENC: [id number]                             [place], [address]   (Exam #: [id number])                           Fax: [phone]      The LMP of this 20year old,  G4, P1-0-2-1 patient was 2017, giving   her an RUDOLPH of DEC 3 2017 and a current gestational age of 25 weeks 5 days   by dates  A sonographic examination was performed on AUG 4 2017 using real   time equipment  The ultrasound examination was performed using abdominal &   vaginal techniques  The patient has a BMI of 34 4  Her blood pressure   today was 104/69  Earliest ultrasound found in her record: 2017 RUDOLPH      Cardiac motion was observed at 163 bpm       INDICATIONS      fetal anatomical survey   obesity      Exam Types      LEVEL II   Transvaginal      RESULTS      Fetus # 1 of 1   Breech presentation   Fetal growth appeared normal   Placenta Location = Posterior   No placenta previa   Placenta Grade = I      MEASUREMENTS (* Included In Average GA)      AC              17 7 cm        22 weeks 2 days* (42%)   BPD              5 5 cm        22 weeks 4 days* (45%)   HC              21 3 cm        23 weeks 1 day * (58%)   Femur            4 0 cm        23 weeks 1 day * (48%)      Nuchal Fold      4 5 mm   NBL              7 9 mm      Humerus          4 0 cm        24 weeks 1 day   (78%)      Cerebellum       2 7 cm        25 weeks 0 days   Biorbit          3 7 cm        23 weeks 3 days   CisternaMagna    6 4 mm      HC/AC           1 21   FL/AC           0 23   FL/BPD          0 73   EFW (Ac/Fl/Hc)   535 grams - 1 lbs 3 oz                 (39%)      THE AVERAGE GESTATIONAL AGE is 22 weeks 6 days +/- 14 days        AMNIOTIC FLUID         Largest Vertical Pocket = 6 0 cm   Amniotic Fluid: Normal CERVICAL EVALUATION      The cervix appeared normal (Ultrasound Examination)  SUPINE      Cervical Length: 4 00 cm      OTHER TEST RESULTS           Funneling?: No             Dynamic Changes?: No        Resp  To TFP?: No                      Debris?: No      ANATOMY      Head                                    Normal   Face/Neck                               Normal   Th  Cav  Normal   Heart                                   Normal   Abd  Cav  Normal   Stomach                                 Normal   Right Kidney                            Normal   Left Kidney                             Normal   Bladder                                 Normal   Abd  Wall                               Normal   Spine                                   Normal   Extrems                                 Normal   Genitalia                               Normal   Placenta                                Normal   Umbl  Cord                              Normal   Uterus                                  Normal   PCI                                     Normal      ANATOMY DETAILS      Visualized Appearing Sonographically Normal:   HEAD: (Calvarium, BPD Level, Cavum, Lateral Ventricles, Choroid Plexus,   Cerebellum, Cisterna Magna);    FACE/NECK: (Neck, Profile, Orbits,   Nose/Lips, Palate, Face);    TH  CAV  : (Lungs, Diaphragm); HEART: (Four   Chamber View, Proximal Left Outflow, Proximal Right Outflow, 3VV, 3 Vessel   Trachea, Short Axis of Greater Vessels, Ductal Arch, Aortic Arch,   Interventricular Septum, Interatrial Septum, IVC, SVC, Cardiac Axis,   Cardiac Position);    ABD  CAV : (Liver, Gall Bladder);    STOMACH, RIGHT   KIDNEY, LEFT KIDNEY, BLADDER, ABD   WALL, SPINE: (Cervical Spine, Thoracic   Spine, Lumbar Spine, Sacrum);    EXTREMS: (Lt Humerus, Rt Humerus, Lt   Forearm, Rt Forearm, Lt Hand, Rt Hand, Lt Femur, Rt Femur, Lt Low Leg, Rt   Low Leg, Lt Foot, Rt Foot); GENITALIA (Male), PLACENTA, UMBL  CORD,   UTERUS, PCI      ADNEXA      The left ovary appeared normal and measured 2 3 x 2 1 x 1 0 cm with a   volume of 2 5 cc  The right ovary appeared normal and measured 3 2 x 2 7 x   1 7 cm with a volume of 7 7 cc  IMPRESSION      Stewart IUP   22 weeks and 6 days by this ultrasound  (RUDOLPH=DEC 2 2017)   Breech presentation   Fetal growth appeared normal   Normal anatomy survey   Regular fetal heart rate of 163 bpm   Posterior placenta   No placenta previa      CONSULT COMMENT      Thank you very much for your kind referral of Kenny Wen to the   [address] [place] [address] in [facility] on 2017 for level II ultrasound   evaluation and MFM consult  Tess Lyles is a 68-year-old black female    4 para 46 who is currently at 22-5/7 weeks gestation by an estimated due   date of 2017 which was established by first trimester   ultrasound dating  She has an uncertain last menstrual period she is   referred for fetal anatomic evaluation today, with consultation performed   for the indication of obesity  She recently transferred prenatal care to   your practice  By history, self a DNA analysis performed  at [facility] earlier in the pregnancy was negative  She has not yet been   screened for gestational diabetes during this pregnancy  Tess Lyles complains   of recurrent headaches  She has a history of a cerebral concussion which   she sustained in 2016  MRI of the brain apparently has been found   to be negative  She has been evaluated by Neurology at [facility], most recently in 2017, based upon this history  Tess Lyles   reports regular fetal movements  She denies problems so far related to   vaginal bleeding or  labor  Tess Lyles has a history of a prior vaginal delivery at term in  at [facility] following an apparently uncomplicated prenatal course   She   delivered a 7 lbs  0 oz  baby girl, currently healthy  She has a history   of one first trimester spontaneous pregnancy loss and an elective     She is obese, with a current BMI of 34 4  She apparently has   been recently diagnosed with genital herpes simplex virus infection,   treated with Valtrex  She has a history of depression  Her past surgical   history is negative  She currently takes no medication with the exception   of a prenatal vitamin on a daily basis and has no known drug allergy  She   denies tobacco, alcohol, or illicit drug use during the pregnancy  The   family medical history is negative with respect to first degree relatives   with diabetes, hypertension, or venous thromboembolism  The family genetic   history is negative with respect to genetic abnormalities, birth defects,   or mental retardation  She has an uncle with sickle cell anemia  Her dad   has sickle cell trait  Carol Lyons believes that she does not carry the sickle   cell trait  No fetal structural abnormality or ultrasound marker for aneuploidy is   identified on the Level II ultrasound study today  Fetal growth and   amniotic fluid volume are normal   The placenta is normal in appearance  The cervix is normal in appearance by transvaginal sonography  The   cervical length is normal   Cervical debris is not present  Cervical   funneling is not present  Neither provocative nor dynamic change is   appreciated  Today's ultrasound findings and suggested follow-up were discussed in   detail with Carol Lyons  We discussed that prenatal ultrasound cannot rule out   all congenital abnormalities  Maternal obesity is associated with an   increased risk for adverse pregnancy outcomes, including gestational   diabetes, fetal growth abnormalities including macrosomia, fetal   structural abnormalities, preeclampsia, venous thromboembolism,   stillbirth, and increased likelihood for  section  Screening for   gestational diabetes should be considered soon   She will return to the   [facility]  at 32 weeks gestation to assess fetal growth for the   indication of obesity  The face to face time, in addition to time spent discussing ultrasound   results, was approximately 15 minutes, greater than 50% of which was spent   during counseling and coordination of care  CLARISSA Fan M D     Maternal-Fetal Medicine   Electronically signed 08/04/17 17:58

## 2018-01-10 NOTE — MISCELLANEOUS
Message   Recorded as Task   Date: 07/17/2017 01:15 PM, Created By: [facility]   Task Name: Care Coordination   Assigned To: Andrea Saba   Regarding Patient: Cris Larsen, Status: Active   CommentCathi Cuff - 17 Jul 2017 1:15 PM     TASK CREATED  Spoke with pt  She reports persistent Mongolian sx  She is also concerned about pain she is having in her left lower leg  States the area is slightly swollen and reddish  She has pain in that area with walking  Discussed with SUDEEP Masters  Pt to be seen today for apt with A L  Active Problems    1  Anxiety (300 00) (F41 9)   2  Cerebral concussion (850 9) (S06 0X9A)   3  Cervical strain (847 0) (S16 1XXA)   4  Currently pregnant (V22 2) (Z33 1)   5  Encounter for supervision of other normal pregnancy in second trimester (V22 1)   (Z34 82)   6  Head injury (959 01) (S09 90XA)   7  History of recent travel (V49 89) (Z78 9)   8  Migrainous headache without aura (346 10) (G43 009)   9  Pregnancy, obstetrical care (V22 1) (Z34 90)   10  Work related injury (959 9) (Y99 0)    Current Meds   1  Metoclopramide HCl - 10 MG Oral Tablet (Reglan); TAKE 1 TABLET EVERY 6 HOURS   AS NEEDED; Therapy: 95MAW3074 to (Jackie Dance)  Requested for: 28Jun2017; Last   Rx:28Jun2017 Ordered   2  Prenatal Vitamins TABS; Take 1 tablet daily; Therapy: (Recorded:17Apr2017) to Recorded   3  Tylenol Extra Strength 500 MG Oral Tablet; TAKE 3 TABLET Daily PRN; Therapy: (Recorded:17Mar2017) to Recorded   4  Unisom Sleepgels 50 MG Oral Capsule; TAKE 1 CAPSULE Bedtime; Therapy: (Recorded:37Ghm0097) to Recorded   5  Vitamin B6 TABS; Take 1 tablet twice daily; Therapy: (Recorded:34Oyc1979) to Recorded    Allergies    1  Flagyl TABS    2   Pollen    Signatures   Electronically signed by : Princess Sky, ; Jul 17 2017  1:16PM EST                       (Author)

## 2018-01-11 NOTE — MISCELLANEOUS
Message  returned pts' p c  -pt states, "is not able to tolerate iron pills "-states has nausea & vomiting after iron intake  recommended pt take iron pills at bedtime with vit B6 & unisom  - reviewed dosages with pt recommended pt take flintstone vitamins in the am  pt verbalized understanding  Active Problems    1  Anemia complicating pregnancy (631 53,760 0) (O99 019)   2  Anxiety (300 00) (F41 9)   3  Bacterial vaginosis (616 10,041 9) (N76 0,B96 89)   4  Cerebral concussion (850 9) (S06 0X9A)   5  Cervical strain (847 0) (S16 1XXA)   6  Currently pregnant (V22 2) (Z34 90)   7  Depression (311) (F32 9)   8  Encounter for supervision of other normal pregnancy in third trimester (V22 1) (Z34 83)   9  Head injury (959 01) (S09 90XA)   10  Herpes exposure (V01 79) (Z20 828)   11  History of recent travel (V49 89) (Z78 9)   12  Maternal obesity, antepartum, second trimester (649 13,278 00) (B14 467)   13  Migrainous headache without aura (346 10) (G43 009)   14  Pregnancy, obstetrical care (V22 1) (Z34 90)   15  Work related injury (959 9) (Y99 0)    Current Meds   1  BusPIRone HCl - 10 MG Oral Tablet; TAKE 1 TABLET 3 TIMES DAILY; Therapy: 04GKC5231 to (Evaluate:01Jai3963)  Requested for: 88SRW6683; Last   Rx:71Xse0274 Ordered   2  Ferrous Sulfate 325 (65 Fe) MG Oral Tablet; Take 1 tab daily before bedtime on an   empty stomach or with orange juice; Therapy: 72FWU9805 to ([id number])  Requested for: 97LQN5639; Last   Rx:06Oct2017 Ordered   3  Prenatal Vitamins TABS; Take 1 tablet daily; Therapy: (Recorded:12Jfd4023) to Recorded   4  Probiotic CAPS; Therapy: (Recorded:43Bzr5754) to Recorded   5  Terconazole 0 4 % Vaginal Cream (Terazol 7); INSERT 1 APPLICATORFUL   INTRAVAGINALLY AT BEDTIME; Therapy: 13KTU7358 to (Evaluate:13Oct2017)  Requested for: 10HBF5319; Last   Rx:29Sep2017 Ordered   6  Tylenol Extra Strength 500 MG Oral Tablet; TAKE 3 TABLET Daily PRN;    Therapy: (Recorded:17Mar2017) to Recorded   7  ValACYclovir HCl - 500 MG Oral Tablet (Valtrex); TAKE 1 TABLET Twice daily for 3 days   for treatment of recurrent infection; Therapy: 96KEB8765 to (Evaluate:29Lgw1319)  Requested for: 85Xzl3611; Last   Rx:50Rme8610 Ordered    Allergies    1  No Known Drug Allergies    2  Pollen   3  Seasonal  Denied    4   Flagyl TABS    Signatures   Electronically signed by : Jose Taylor RN; Oct 19 2017  3:00PM EST                       (Author)

## 2018-01-11 NOTE — MISCELLANEOUS
Message   Recorded as Task   Date: 08/07/2017 03:40 PM, Created By: Norman Thomas   Task Name: Call Back   Assigned To: Thea Lima   Regarding Patient: Elieser Gayle, Status: Active   Comment:    Norman Jaescooter - 07 Aug 2017 3:40 PM     TASK CREATED  Pt has yellow discharge and fishy odor  Says she gets frequent bacterial infections  Pt has had it 2 to 3 weeks  Per Ferdinand Andujar, pt needs Vivianabernadine Martini - 07 Aug 2017 3:43 PM     TASK EDITED  Pt given appt        Active Problems    1  Anxiety (300 00) (F41 9)   2  Cerebral concussion (850 9) (S06 0X9A)   3  Cervical strain (847 0) (S16 1XXA)   4  Currently pregnant (V22 2) (Z34 90)   5  Encounter for fetal anatomic survey (V28 81) (Z36)   6  Encounter for screening for risk of pre-term labor (V28 82) (Z36)   7  Encounter for supervision of other normal pregnancy in second trimester (V22 1)   (Z34 82)   8  Head injury (959 01) (S09 90XA)   9  Herpes exposure (V01 79) (Z20 828)   10  History of recent travel (V49 89) (Z78 9)   11  Maternal obesity, antepartum, second trimester (649 13,278 00) (T34 720)   12  Migrainous headache without aura (346 10) (G43 009)   13  Pregnancy, obstetrical care (V22 1) (Z34 90)   14  Work related injury (959 9) (Y99 0)    Current Meds   1  Prenatal Vitamins TABS; Take 1 tablet daily; Therapy: (Recorded:97Ihf6739) to Recorded   2  Probiotic CAPS; Therapy: (Recorded:01Vcx7694) to Recorded   3  Tylenol Extra Strength 500 MG Oral Tablet; TAKE 3 TABLET Daily PRN; Therapy: (Recorded:17Mar2017) to Recorded   4  ValACYclovir HCl - 500 MG Oral Tablet (Valtrex); TAKE 1 TABLET Twice daily for 3 days   for treatment of recurrent infection; Therapy: 00EUS6586 to (Evaluate:25Jhd4068)  Requested for: 51Htc9678; Last   Rx:30Brq5507 Ordered    Allergies    1  No Known Drug Allergies    2  Pollen   3  Seasonal  Denied    4  Flagyl TABS    Signatures   Electronically signed by :  Gale Darling, ; Aug  7 2017  3:43PM EST (Author)

## 2018-01-12 VITALS
DIASTOLIC BLOOD PRESSURE: 68 MMHG | HEART RATE: 70 BPM | HEIGHT: 63 IN | SYSTOLIC BLOOD PRESSURE: 100 MMHG | WEIGHT: 189 LBS | BODY MASS INDEX: 33.49 KG/M2

## 2018-01-12 NOTE — MISCELLANEOUS
Message   Recorded as Task   Date: 11/09/2017 10:08 AM, Created By: Bertha Whyte   Task Name: Care Coordination   Assigned To: [facility],Team   Regarding Patient: Carina Tinoco, Status: In Progress   Comment:    Irene Gifford - 09 Nov 2017 10:08 AM     TASK CREATED  Patient call to get HIV testing results  Best reached at [phone]   Bertha Whyte - 09 Nov 2017 10:09 AM     TASK IN PROGRESS   Irene Gifford - 09 Nov 2017 10:10 AM     TASK EDITED  Spoke with pt - she had the test done yesterday at a [address] [place] [address]'s lab  Advised her that we don't have the results, but I will call the lab to see if available the  give her a call back  Irene Gifford - 09 Nov 2017 10:17 AM     TASK EDITED  Called the lab spoke with Danie Zee - she states the test is not resulted yet  Patient is aware of this  Advised her once we have the results and they are reviewed by the provider, she will be notified  Active Problems    1  Anemia complicating pregnancy (178 00,068 9) (O99 019)   2  Anxiety (300 00) (F41 9)   3  Bacterial vaginosis (616 10,041 9) (N76 0,B96 89)   4  Cerebral concussion (850 9) (S06 0X9A)   5  Cervical strain (847 0) (S16 1XXA)   6  Currently pregnant (V22 2) (Z34 90)   7  Depression (311) (F32 9)   8  Encounter for supervision of other normal pregnancy in third trimester (V22 1) (Z34 83)   9  Head injury (959 01) (S09 90XA)   10  Herpes exposure (V01 79) (Z20 828)   11  History of recent travel (V49 89) (Z78 9)   12  Maternal obesity, antepartum, second trimester (649 13,278 00) (M33 788)   13  Migrainous headache without aura (346 10) (G43 009)   14  Need for Tdap vaccination (V06 1) (Z23)   15  Pregnancy, obstetrical care (V22 1) (Z34 90)   16  Work related injury (959 9) (Y99 0)    Current Meds   1  BusPIRone HCl - 10 MG Oral Tablet; TAKE 1 TABLET 3 TIMES DAILY; Therapy: 50VWV8983 to (Evaluate:56Eky6859)  Requested for: 08FTN5448; Last   Rx:47Ffi8509 Ordered   2   Ferrous Sulfate 325 (65 Fe) MG Oral Tablet; Take 1 tab daily before bedtime on an   empty stomach or with orange juice; Therapy: 64ZHG8764 to (452 8137)  Requested for: 77OKZ6544; Last   Rx:06Oct2017 Ordered   3  Prenatal Vitamins TABS; Take 1 tablet daily; Therapy: (Recorded:44Lbn6773) to Recorded   4  Probiotic CAPS; Therapy: (Recorded:91Ayo1564) to Recorded   5  Terconazole 0 4 % Vaginal Cream (Terazol 7); INSERT 1 APPLICATORFUL   INTRAVAGINALLY AT BEDTIME; Therapy: 23IUF5403 to (Evaluate:13Oct2017)  Requested for: 58RXR7085; Last   Rx:70Fhx7430 Ordered   6  Tylenol Extra Strength 500 MG Oral Tablet; TAKE 3 TABLET Daily PRN; Therapy: (Recorded:17Mar2017) to Recorded   7  ValACYclovir HCl - 1 GM Oral Tablet; TAKE 1 TABLET DAILY AS DIRECTED; Therapy: 42KKK7126 to (Evaluate:22Ems5760)  Requested for: 98VKN1295; Last   Rx:08Nov2017 Ordered   8  ValACYclovir HCl - 500 MG Oral Tablet (Valtrex); TAKE 1 TABLET Twice daily for 3 days   for treatment of recurrent infection; Therapy: 97YMH9439 to (Evaluate:82Bpz6597)  Requested for: 01Nhz7511; Last   Rx:10Agr0302 Ordered    Allergies    1  No Known Drug Allergies    2  Pollen   3  Seasonal  Denied    4   Flagyl TABS    Signatures   Electronically signed by : Lucio Younger, ; Nov 9 2017 10:17AM EST                       (Author)

## 2018-01-12 NOTE — MISCELLANEOUS
Message   Recorded as Task   Date: 09/27/2017 09:20 AM, Created By: Mechelle Amador   Task Name: Call Back   Assigned To: Carolyn Corby   Regarding Patient: Alyce Kim, Status: Active   Comment:    Mechelle Amador - 27 Sep 2017 9:20 AM     TASK CREATED  Patient NS for visit today and last week  Spoke with patient, she was another call and will call back to reschedule    Houston Farhan - 27 Sep 2017 9:33 AM     TASK EDITED  Need to attempt to reach pt again to reschedule an apt if she has not already done so  Elvin Putnam - 28 Sep 2017 9:38 AM     TASK EDITED  Pt has an apt with Christin tomorrow        Active Problems    1  Anxiety (300 00) (F41 9)   2  Bacterial vaginosis (616 10,041 9) (N76 0,B96 89)   3  Cerebral concussion (850 9) (S06 0X9A)   4  Cervical strain (847 0) (S16 1XXA)   5  Currently pregnant (V22 2) (Z34 90)   6  Encounter for fetal anatomic survey (V28 81) (Z36)   7  Encounter for screening for risk of pre-term labor (V28 82) (Z36)   8  Encounter for supervision of other normal pregnancy in second trimester (V22 1)   (Z34 82)   9  Head injury (959 01) (S09 90XA)   10  Herpes exposure (V01 79) (Z20 828)   11  History of recent travel (V49 89) (Z78 9)   12  Maternal obesity, antepartum, second trimester (649 13,278 00) (D81 680)   13  Migrainous headache without aura (346 10) (G43 009)   14  Pregnancy, obstetrical care (V22 1) (Z34 90)   15  Work related injury (959 9) (Y99 0)    Current Meds   1  MetroNIDAZOLE 500 MG Oral Tablet; TAKE 1 TABLET TWICE DAILY UNTIL FINISHED; Therapy: 95VDK4263 to (Evaluate:09Szq5264)  Requested for: 88Wjt4998; Last   Rx:12Dpf3500 Ordered   2  Prenatal Vitamins TABS; Take 1 tablet daily; Therapy: (Recorded:88Tqs9775) to Recorded   3  Probiotic CAPS; Therapy: (Recorded:18Mdv9785) to Recorded   4  Tylenol Extra Strength 500 MG Oral Tablet; TAKE 3 TABLET Daily PRN; Therapy: (Recorded:17Mar2017) to Recorded   5   ValACYclovir HCl - 500 MG Oral Tablet (Valtrex); TAKE 1 TABLET Twice daily for 3 days   for treatment of recurrent infection; Therapy: 40BBK3660 to (Evaluate:87Qwv3149)  Requested for: 11Xvm4642; Last   Rx:12Hty9828 Ordered    Allergies    1  No Known Drug Allergies    2  Pollen   3  Seasonal  Denied    4  Flagyl TABS    Signatures   Electronically signed by :  Filiberto Darling, ; Sep 28 2017  9:38AM EST                       (Author)

## 2018-01-13 VITALS
HEIGHT: 63 IN | DIASTOLIC BLOOD PRESSURE: 69 MMHG | SYSTOLIC BLOOD PRESSURE: 104 MMHG | BODY MASS INDEX: 33.38 KG/M2 | WEIGHT: 188.4 LBS

## 2018-01-13 VITALS
HEIGHT: 62 IN | BODY MASS INDEX: 34.05 KG/M2 | DIASTOLIC BLOOD PRESSURE: 73 MMHG | WEIGHT: 185.04 LBS | SYSTOLIC BLOOD PRESSURE: 109 MMHG

## 2018-01-13 NOTE — PSYCH
Behavioral Health Outpatient Intake    Referred By: Joaquín RENTERIA  Intake Questions: there are no developmental disabilities  the patient does not have a hearing impairment  the patient does not have an ICM or CTT  patient is not taking injectable psychiatric medications  Employment: The patient is not employed  at [employer]   Emergency Contact Information:   Emergency Contact: Maria Guadalupe Medley   Relationship to Patient: MOTHER   Phone Number: [phone]   Previous Psychiatric Treatment: She has previously been seen by a psychiatrist  [address], 2010  She has previously been seen by a therapist  [address], 2010   History: no  service  She has not had combat service  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#[id number]     Primary Care Physician: Ej Guillen       Signatures   Electronically signed by : Omar Cain, ; Jul 26 2017  3:34PM EST                       (Author)

## 2018-01-13 NOTE — MISCELLANEOUS
Message   Recorded as Task   Date: 07/26/2017 02:43 PM, Created By: Juany Kaplan   Task Name: Follow Up   Assigned To: Mckayla Lima   Regarding Patient: Shelvy Meckel, Status: Active   Comment:    Juany Kaplan - 26 Jul 2017 2:43 PM     TASK CREATED  Patient had discussed a psychiatric consult for anxiety and increased fear of death at the last visit  She called them and she states they told her there are no appointments available til January 2018  Can we please look into this  They're supposed to have sooner appointments than that  Thx in advance! Elias,Jan - 26 Jul 2017 3:03 PM     TASK EDITED  called psychiatric assoc office - received a voicemessage-L/M with office regarding pt information & also that a referral was entered on 6/28/17  asked that they call me back asap        Active Problems    1  Anxiety (300 00) (F41 9)   2  Cerebral concussion (850 9) (S06 0X9A)   3  Cervical strain (847 0) (S16 1XXA)   4  Currently pregnant (V22 2) (Z34 90)   5  Encounter for supervision of other normal pregnancy in second trimester (V22 1)   (Z34 82)   6  Head injury (959 01) (S09 90XA)   7  Herpes exposure (V01 79) (Z20 828)   8  History of recent travel (V49 89) (Z78 9)   9  Migrainous headache without aura (346 10) (G43 009)   10  Pregnancy, obstetrical care (V22 1) (Z34 90)   11  Work related injury (959 9) (Y99 0)    Current Meds   1  Prenatal Vitamins TABS; Take 1 tablet daily; Therapy: (Recorded:87Msn0936) to Recorded   2  Probiotic CAPS; Therapy: (Recorded:53Sad8728) to Recorded   3  Tylenol Extra Strength 500 MG Oral Tablet; TAKE 3 TABLET Daily PRN; Therapy: (Recorded:17Mar2017) to Recorded   4  ValACYclovir HCl - 500 MG Oral Tablet (Valtrex); TAKE 1 TABLET Twice daily for 3 days   for treatment of recurrent infection; Therapy: 13CZV1348 to (Evaluate:34Krn1523)  Requested for: 90Tnx5807; Last   Rx:51Eoi1057 Ordered    Allergies    1  Pollen  Denied    2   Flagyl TABS    Signatures Electronically signed by : Hieu Angel RN; Jul 26 2017  3:05PM EST                       (Author)

## 2018-01-13 NOTE — MISCELLANEOUS
Message  Patient called, she is 34 weeks  She called last night and spoke with the on call doctor  She had very bad cramps on her left side  She said she was told it sounded like round ligament pain  She is complaining of left sided stabbing pain  She says she can not bend over  She denies contractions, leaking of fluid, bleeding, she is feeling the baby move  I spoke with Dr Philip  [facility], she said the patient should be seen in the office  I made her an appointment for 3:20 today in [place] with Maylin Ojeda, [address]  Active Problems    1  Anemia complicating pregnancy (695 30,073 3) (O99 019)   2  Anxiety (300 00) (F41 9)   3  Bacterial vaginosis (616 10,041 9) (N76 0,B96 89)   4  Cerebral concussion (850 9) (S06 0X9A)   5  Cervical strain (847 0) (S16 1XXA)   6  Currently pregnant (V22 2) (Z34 90)   7  Depression (311) (F32 9)   8  Encounter for supervision of other normal pregnancy in third trimester (V22 1) (Z34 83)   9  Head injury (959 01) (S09 90XA)   10  Herpes exposure (V01 79) (Z20 828)   11  History of recent travel (V49 89) (Z78 9)   12  Maternal obesity, antepartum, second trimester (649 13,278 00) (N42 502)   13  Migrainous headache without aura (346 10) (G43 009)   14  Pregnancy, obstetrical care (V22 1) (Z34 90)   15  Work related injury (959 9) (Y99 0)    Current Meds   1  BusPIRone HCl - 10 MG Oral Tablet; TAKE 1 TABLET 3 TIMES DAILY; Therapy: 31BYP4355 to (Evaluate:90Opk5240)  Requested for: 79QPF1089; Last   Rx:36Hfk3385 Ordered   2  Ferrous Sulfate 325 (65 Fe) MG Oral Tablet; Take 1 tab daily before bedtime on an   empty stomach or with orange juice; Therapy: 94OFV9726 to (Darrold Apple)  Requested for: 28BJU7699; Last   Rx:06Oct2017 Ordered   3  Prenatal Vitamins TABS; Take 1 tablet daily; Therapy: (Recorded:30Wnj1169) to Recorded   4  Probiotic CAPS; Therapy: (Recorded:14Fzc7508) to Recorded   5  Terconazole 0 4 % Vaginal Cream (Terazol 7);  INSERT 1 APPLICATORFUL INTRAVAGINALLY AT BEDTIME; Therapy: 91OKC0317 to (Evaluate:13Oct2017)  Requested for: 27AXB1092; Last   Rx:46Umw7190 Ordered   6  Tylenol Extra Strength 500 MG Oral Tablet; TAKE 3 TABLET Daily PRN; Therapy: (Recorded:17Mar2017) to Recorded   7  ValACYclovir HCl - 500 MG Oral Tablet (Valtrex); TAKE 1 TABLET Twice daily for 3 days   for treatment of recurrent infection; Therapy: 89UOI8282 to (Evaluate:29Jul2017)  Requested for: 92Qzy0283; Last   Rx:68Hhh3253 Ordered    Allergies    1  No Known Drug Allergies    2  Pollen   3  Seasonal  Denied    4   Flagyl TABS    Signatures   Electronically signed by : Beth Richey, ; Oct 23 2017  1:14PM EST                       (Author)

## 2018-01-13 NOTE — MISCELLANEOUS
Message  Patient called, she has vaginal itching, discomfort, redness, and white discharge  She used a 3 day monistat about 1 week ago  Symptoms went away briefly but now are back  She was given an appointment for today with Dr Dariel Arrieta  Active Problems    1  Anemia complicating pregnancy (827 12,841 1) (O99 019)   2  Anxiety (300 00) (F41 9)   3  Bacterial vaginosis (616 10,041 9) (N76 0,B96 89)   4  Cerebral concussion (850 9) (S06 0X9A)   5  Cervical strain (847 0) (S16 1XXA)   6  Currently pregnant (V22 2) (Z34 90)   7  Depression (311) (F32 9)   8  Encounter for supervision of other normal pregnancy in third trimester (V22 1) (Z34 83)   9  Head injury (959 01) (S09 90XA)   10  Herpes exposure (V01 79) (Z20 828)   11  History of recent travel (V49 89) (Z78 9)   12  Maternal obesity, antepartum, second trimester (649 13,278 00) (M17 311)   13  Migrainous headache without aura (346 10) (G43 009)   14  Need for Tdap vaccination (V06 1) (Z23)   15  Pregnancy, obstetrical care (V22 1) (Z34 90)   16  Work related injury (959 9) (Y99 0)    Current Meds   1  BusPIRone HCl - 10 MG Oral Tablet; TAKE 1 TABLET 3 TIMES DAILY; Therapy: 18IXM0190 to (Evaluate:54Jxt3010)  Requested for: 14XGN6078; Last   Rx:43Wpk0990 Ordered   2  Ferrous Sulfate 325 (65 Fe) MG Oral Tablet; Take 1 tab daily before bedtime on an   empty stomach or with orange juice; Therapy: 72UGO4293 to ([id number])  Requested for: 00PNP4007; Last   Rx:06Oct2017 Ordered   3  Prenatal Vitamins TABS; Take 1 tablet daily; Therapy: (Recorded:12Voi3008) to Recorded   4  Probiotic CAPS; Therapy: (Recorded:68Gdi1043) to Recorded   5  Terconazole 0 4 % Vaginal Cream (Terazol 7); INSERT 1 APPLICATORFUL   INTRAVAGINALLY AT BEDTIME; Therapy: 85DZT1553 to (Evaluate:13Oct2017)  Requested for: 06VZK4814; Last   Rx:29Sep2017 Ordered   6  Tylenol Extra Strength 500 MG Oral Tablet; TAKE 3 TABLET Daily PRN; Therapy: (Recorded:17Mar2017) to Recorded   7  ValACYclovir HCl - 1 GM Oral Tablet; TAKE 1 TABLET DAILY AS DIRECTED; Therapy: 69MXQ1045 to (Evaluate:31Gvq6194)  Requested for: 50SVS0429; Last   Rx:10Nov2017 Ordered   8  ValACYclovir HCl - 500 MG Oral Tablet (Valtrex); TAKE 1 TABLET EVERY DAY; Therapy: 64GTA0920 to (Elham Manifold)  Requested for: 56WBI2755; Last   Rx:10Nov2017 Ordered    Allergies    1  No Known Drug Allergies    2  Pollen   3  Seasonal  Denied    4   Flagyl TABS    Signatures   Electronically signed by : Reid Soulier, ; Nov 28 2017  1:18PM EST                       (Author)

## 2018-01-14 VITALS
HEIGHT: 63 IN | SYSTOLIC BLOOD PRESSURE: 106 MMHG | BODY MASS INDEX: 33.31 KG/M2 | WEIGHT: 188 LBS | DIASTOLIC BLOOD PRESSURE: 80 MMHG | HEART RATE: 72 BPM

## 2018-01-14 NOTE — MISCELLANEOUS
Message   Recorded as Task   Date: 09/26/2017 03:15 PM, Created By: Asuncion Morillo   Task Name: Call Back   Assigned To: [facility],Team   Regarding Patient: Joyce Luna, Status: In Progress   Comment:    Roxie Flores - 26 Sep 2017 3:15 PM     TASK CREATED  Pt called office today, left voicemail message  Pt's RUDOLPH is 12/3/17, GA 30-2  Pt did not show for appointment scheduled with KTM on 9/20/17, has pending appointment on 9/27/17  Pt states she has questions for OB nursing staff  Pt can be reached @ [phone]  Thank you! Michelle Méndez - 26 Sep 2017 3:24 PM     TASK EDITED   Michelle Méndez - 26 Sep 2017 3:40 PM     TASK EDITED  I spoke with patient, the appt on 9/27 is not with us  She is complaining of excessive vaginal itching, she says her skin is peeling  It is mostly external and mostly at night  She says she has white discharge, no foul odor  She has a history of yeast infections but did not want to use the monistat until she is checked  She says this is much worse than any yeast infection she has ever had  She has had symptoms for about 1 week  I gave her an appointment tomorrow with Dr Stone December  Active Problems    1  Anxiety (300 00) (F41 9)   2  Bacterial vaginosis (616 10,041 9) (N76 0,B96 89)   3  Cerebral concussion (850 9) (S06 0X9A)   4  Cervical strain (847 0) (S16 1XXA)   5  Currently pregnant (V22 2) (Z34 90)   6  Encounter for fetal anatomic survey (V28 81) (Z36)   7  Encounter for screening for risk of pre-term labor (V28 82) (Z36)   8  Encounter for supervision of other normal pregnancy in second trimester (V22 1)   (Z34 82)   9  Head injury (959 01) (S09 90XA)   10  Herpes exposure (V01 79) (Z20 828)   11  History of recent travel (V49 89) (Z78 9)   12  Maternal obesity, antepartum, second trimester (649 13,278 00) (N08 455)   13  Migrainous headache without aura (346 10) (G43 009)   14  Pregnancy, obstetrical care (V22 1) (Z34 90)   15   Work related injury (959 9) (Y99 0)    Current Meds   1  MetroNIDAZOLE 500 MG Oral Tablet; TAKE 1 TABLET TWICE DAILY UNTIL FINISHED; Therapy: 08NIO6117 to (Evaluate:40Wux2046)  Requested for: 08Qkf9183; Last   Rx:09Aug2017 Ordered   2  Prenatal Vitamins TABS; Take 1 tablet daily; Therapy: (Recorded:26Qqt9788) to Recorded   3  Probiotic CAPS; Therapy: (Recorded:00Cgu9417) to Recorded   4  Tylenol Extra Strength 500 MG Oral Tablet; TAKE 3 TABLET Daily PRN; Therapy: (Recorded:17Mar2017) to Recorded   5  ValACYclovir HCl - 500 MG Oral Tablet (Valtrex); TAKE 1 TABLET Twice daily for 3 days   for treatment of recurrent infection; Therapy: 39LRE9643 to (Evaluate:53Pyc6693)  Requested for: 96Fks1219; Last   Rx:28Rqk2094 Ordered    Allergies    1  No Known Drug Allergies    2  Pollen   3  Seasonal  Denied    4   Flagyl TABS    Signatures   Electronically signed by : Cristhian Javier, ; Sep 26 2017  3:40PM EST                       (Author)

## 2018-01-14 NOTE — MISCELLANEOUS
Message  Patient call the office today 07/10/2017 and wanted to know her results   Results was given to her for the urine culture and for GC/ CT and HIV results were all negative  Active Problems    1  Anxiety (300 00) (F41 9)   2  Cerebral concussion (850 9) (S06 0X9A)   3  Cervical strain (847 0) (S16 1XXA)   4  Currently pregnant (V22 2) (Z33 1)   5  Encounter for supervision of other normal pregnancy in second trimester (V22 1)   (Z34 82)   6  Head injury (959 01) (S09 90XA)   7  History of recent travel (V49 89) (Z78 9)   8  Migrainous headache without aura (346 10) (G43 009)   9  Pregnancy, obstetrical care (V22 1) (Z34 90)   10  Work related injury (959 9) (Y99 0)    Current Meds   1  Metoclopramide HCl - 10 MG Oral Tablet (Reglan); TAKE 1 TABLET EVERY 6 HOURS   AS NEEDED; Therapy: 28CNF1553 to (0699 [id number] [id number])  Requested for: 28Jun2017; Last   Rx:28Jun2017 Ordered   2  Prenatal Vitamins TABS; Take 1 tablet daily; Therapy: (Recorded:12Jlc2287) to Recorded   3  Tylenol Extra Strength 500 MG Oral Tablet; TAKE 3 TABLET Daily PRN; Therapy: (Recorded:17Mar2017) to Recorded   4  Unisom Sleepgels 50 MG Oral Capsule; TAKE 1 CAPSULE Bedtime; Therapy: (Recorded:39Wvs6571) to Recorded   5  Vitamin B6 TABS; Take 1 tablet twice daily; Therapy: (Recorded:22Iio6776) to Recorded    Allergies    1  Flagyl TABS    2   Pollen    Signatures   Electronically signed by : Jared Matt MA; Jul 10 2017  3:55PM EST                       (Author)

## 2018-01-15 NOTE — MISCELLANEOUS
Message   Recorded as Task   Date: 07/26/2017 02:43 PM, Created By: Ken Elizalde   Task Name: Follow Up   Assigned To: Shanita Holden   Regarding Patient: Abdias Boston, Status: In Progress   Comment:    Cristine Moses - 26 Jul 2017 2:43 PM     TASK CREATED  Patient had discussed a psychiatric consult for anxiety and increased fear of death at the last visit  She called them and she states they told her there are no appointments available til January 2018  Can we please look into this  They're supposed to have sooner appointments than that  Thx in advance! Elias,Jan - 26 Jul 2017 3:03 PM     TASK EDITED  called psychiatric assoc office - received a voicemessage-L/M with office regarding pt information & also that a referral was entered on 6/28/17  asked that they call me back ezra GriffinJan - 26 Jul 2017 3:08 PM     TASK IN PROGRESS   EliasJan - 26 Jul 2017 3:15 PM     TASK EDITED  kip from psychiatric office called stating that pt would be able to be seen by a therapist much sooner  she will call pt to set up an appt  Active Problems    1  Anxiety (300 00) (F41 9)   2  Cerebral concussion (850 9) (S06 0X9A)   3  Cervical strain (847 0) (S16 1XXA)   4  Currently pregnant (V22 2) (Z34 90)   5  Encounter for supervision of other normal pregnancy in second trimester (V22 1)   (Z34 82)   6  Head injury (959 01) (S09 90XA)   7  Herpes exposure (V01 79) (Z20 828)   8  History of recent travel (V49 89) (Z78 9)   9  Migrainous headache without aura (346 10) (G43 009)   10  Pregnancy, obstetrical care (V22 1) (Z34 90)   11  Work related injury (959 9) (Y99 0)    Current Meds   1  Prenatal Vitamins TABS; Take 1 tablet daily; Therapy: (Recorded:93Jft2037) to Recorded   2  Probiotic CAPS; Therapy: (Recorded:10Dgw1342) to Recorded   3  Tylenol Extra Strength 500 MG Oral Tablet; TAKE 3 TABLET Daily PRN; Therapy: (Recorded:17Mar2017) to Recorded   4   ValACYclovir HCl - 500 MG Oral Tablet (Valtrex); TAKE 1 TABLET Twice daily for 3 days   for treatment of recurrent infection; Therapy: 09IJO3908 to (Evaluate:65Pfs8730)  Requested for: 98Oqi2024; Last   Rx:40Hyj8571 Ordered    Allergies    1  Pollen  Denied    2   Flagyl TABS    Signatures   Electronically signed by : Jose Taylor RN; Jul 26 2017  3:15PM EST                       (Author)

## 2018-01-15 NOTE — PROGRESS NOTES
OCT 6 2017         RE: Rickey Iverson                                 To: [facility]  MR#: [id number]                                  [address]   : [address]                                  [address]   ENC: [id number]:AD Ar[place], [address]   (Exam #: [id number])                           Fax: [phone]      The LMP of this 20year old,  G4, P1-0-2-1 patient was 2017, giving   her an RUDOLPH of DEC 3 2017 and a current gestational age of 34 weeks 5 days   by dates  A sonographic examination was performed on OCT 6 2017 using real   time equipment  The ultrasound examination was performed using abdominal   technique  The patient has a BMI of 33 8  Her blood pressure today was   109/73  Earliest ultrasound found in her record: 2017 RUDOLPH      Cardiac motion was observed at 132 bpm       INDICATIONS      obesity      Exam Types      Level I      RESULTS      Fetus # 1 of 1   Vertex presentation   Fetal growth appeared normal   Placenta Location = Posterior   No placenta previa   Placenta Grade = II      MEASUREMENTS (* Included In Average GA)      AC              26 3 cm        30 weeks 3 days* (25%)   BPD              8 1 cm        32 weeks 5 days* (60%)   HC              30 3 cm        33 weeks 2 days* (61%)   Femur            6 1 cm        31 weeks 3 days* (45%)      Cerebellum       4 2 cm        34 weeks 2 days      HC/AC           1 16   FL/AC           0 23   FL/BPD          0 75   EFW (Ac/Fl/Hc)  1739 grams - 3 lbs 13 oz                 (39%)      THE AVERAGE GESTATIONAL AGE is 32 weeks 0 days +/- 18 days  AMNIOTIC FLUID      Q1: 5 2      Q2: 5 0      Q3: 6 5      Q4: 2 5   RAY Total = 19 2 cm   Amniotic Fluid: Normal      ANATOMY DETAILS      Visualized Appearing Sonographically Normal:   HEAD: (Calvarium, BPD Level, Lateral Ventricles, Cerebellum, Cisterna   Magna);    TH  CAV  : (Lungs, Diaphragm);     HEART: (Proximal Left Outflow,   Proximal Right Outflow, Cardiac Axis, Cardiac Position);    STOMACH, RIGHT   KIDNEY, LEFT KIDNEY, BLADDER, PLACENTA      ANATOMY COMMENTS      Fetal anatomy has been previously documented; no anomalies were identified  IMPRESSION      Stewart IUP   32 weeks and 0 days by this ultrasound  (RUDOLPH=DEC 1 2017)   Vertex presentation   Fetal growth appeared normal   Regular fetal heart rate of 132 bpm   Posterior placenta   No placenta previa      GENERAL COMMENT      The patient has no complaints today  She reports regular fetal movements   and denies problems related to hypertension, gestational diabetes,    labor, or vaginal bleeding  Her prenatal course has apparently been   unremarkable in the interim since her most recent visit here  RECOMMENDATION      Growth Ultrasound: As indicated      CLARISSA Lopez M D     Maternal-Fetal Medicine   Electronically signed 10/07/17 09:07

## 2018-01-15 NOTE — MISCELLANEOUS
Message  pt called office stating, "I used the 7 days of monistate & it did not wk " she also has c/o Left Lower leg pain x 2 days  attempting to schedule appt today at  office  informed pt -will call her back asap with appt  Active Problems    1  Anxiety (300 00) (F41 9)   2  Cerebral concussion (850 9) (S06 0X9A)   3  Cervical strain (847 0) (S16 1XXA)   4  Currently pregnant (V22 2) (Z33 1)   5  Encounter for supervision of other normal pregnancy in second trimester (V22 1)   (Z34 82)   6  Head injury (959 01) (S09 90XA)   7  History of recent travel (V49 89) (Z78 9)   8  Migrainous headache without aura (346 10) (G43 009)   9  Pregnancy, obstetrical care (V22 1) (Z34 90)   10  Work related injury (959 9) (Y99 0)    Current Meds   1  Metoclopramide HCl - 10 MG Oral Tablet (Reglan); TAKE 1 TABLET EVERY 6 HOURS   AS NEEDED; Therapy: 83ZOS0164 to (John Del Real)  Requested for: 28Jun2017; Last   Rx:28Jun2017 Ordered   2  Prenatal Vitamins TABS; Take 1 tablet daily; Therapy: (Recorded:17Apr2017) to Recorded   3  Tylenol Extra Strength 500 MG Oral Tablet; TAKE 3 TABLET Daily PRN; Therapy: (Recorded:17Mar2017) to Recorded   4  Unisom Sleepgels 50 MG Oral Capsule; TAKE 1 CAPSULE Bedtime; Therapy: (Recorded:31Vtb9618) to Recorded   5  Vitamin B6 TABS; Take 1 tablet twice daily; Therapy: (Recorded:42Ech4567) to Recorded    Allergies    1  Flagyl TABS    2   Pollen    Signatures   Electronically signed by : Sandra Zaidi RN; Jul 17 2017 12:15PM EST                       (Author)

## 2018-01-16 NOTE — MISCELLANEOUS
Message   Recorded as Task   Date: 10/16/2017 11:39 AM, Created By: Sravan Duvall   Task Name: Medical Complaint Callback   Assigned To: Carolyn Tavarez   Regarding Patient: Alyce Kim, Status: In Progress   CommentJessika Richards - 16 Oct 2017 11:39 AM     TASK CREATED  Caller: Self; Medical Complaint; [phone] (Home)  Patient called, she states she is suppose to be taking iron supplements but she can't take them   She says she vomits violently when she does  Yesterday she had a bad dizzy spell  Please advise  Joseph Hernandez - 49 Oct 2017 12:24 PM     TASK REPLIED TO: Previously Assigned To [facility],Team                      does this happens with all supplements?  possibly try a different one - or flinestones with iron-two tabs? consider hemetology consult for possible IV iron-    or needs to make concerted effort to eat iron rich foods   Sravan Duvall - 16 Oct 2017 1:05 PM     TASK EDITED   Sravan Duvall - 16 Oct 2017 1:15 PM     TASK EDITED  I spoke with patient, she has tried the ferrous sulfate  The PNV she is taking is a gummy  I told her there is no iron in them, she is going to switch to Flinstones with iron, 2 daily and increase her dietary iron as well  I mailed her the iron rich foods sheet  Active Problems    1  Anemia complicating pregnancy (194 93,192 3) (O99 019)   2  Anxiety (300 00) (F41 9)   3  Bacterial vaginosis (616 10,041 9) (N76 0,B96 89)   4  Cerebral concussion (850 9) (S06 0X9A)   5  Cervical strain (847 0) (S16 1XXA)   6  Currently pregnant (V22 2) (Z34 90)   7  Depression (311) (F32 9)   8  Encounter for supervision of other normal pregnancy in third trimester (V22 1) (Z34 83)   9  Head injury (959 01) (S09 90XA)   10  Herpes exposure (V01 79) (Z20 828)   11  History of recent travel (V49 89) (Z78 9)   12  Maternal obesity, antepartum, second trimester (649 13,278 00) (J91 224)   13  Migrainous headache without aura (346 10) (G43 009)   14   Pregnancy, obstetrical care (V22 1) (Z34 90)   15  Work related injury (959 9) (Y99 0)    Current Meds   1  BusPIRone HCl - 10 MG Oral Tablet; TAKE 1 TABLET 3 TIMES DAILY; Therapy: 13WZL1652 to (Evaluate:26Wwr4115)  Requested for: 10CLR6095; Last   Rx:28Sep2017 Ordered   2  Ferrous Sulfate 325 (65 Fe) MG Oral Tablet; Take 1 tab daily before bedtime on an   empty stomach or with orange juice; Therapy: 43AYK7333 to (Kunal Hopper)  Requested for: 80AMG8317; Last   Rx:06Oct2017 Ordered   3  Prenatal Vitamins TABS; Take 1 tablet daily; Therapy: (Recorded:03Res9332) to Recorded   4  Probiotic CAPS; Therapy: (Recorded:17Vsn1079) to Recorded   5  Terconazole 0 4 % Vaginal Cream (Terazol 7); INSERT 1 APPLICATORFUL   INTRAVAGINALLY AT BEDTIME; Therapy: 89QQF1846 to (Evaluate:13Oct2017)  Requested for: 62CYP9647; Last   Rx:89Aoh8437 Ordered   6  Tylenol Extra Strength 500 MG Oral Tablet; TAKE 3 TABLET Daily PRN; Therapy: (Recorded:17Mar2017) to Recorded   7  ValACYclovir HCl - 500 MG Oral Tablet (Valtrex); TAKE 1 TABLET Twice daily for 3 days   for treatment of recurrent infection; Therapy: 01PYY9844 to (Evaluate:80Jez7279)  Requested for: 96Gvx4228; Last   Rx:39Mvu6848 Ordered    Allergies    1  No Known Drug Allergies    2  Pollen   3  Seasonal  Denied    4   Flagyl TABS    Signatures   Electronically signed by : Kim Tinoco, ; Oct 16 2017  1:16PM EST                       (Author)

## 2018-01-18 NOTE — MISCELLANEOUS
Message  returned pts' p c - pt states "was just started on an antibiotic yesterday for strep throat " I am having symptoms of a yeast infection  pt advised, per protocol to use OTC monistat 3 or 5 day & to insert at nighttime before going to sleep  pt to call office after course of monistat if no relief  Active Problems    1  Anxiety (300 00) (F41 9)   2  Cerebral concussion (850 9) (S06 0X9A)   3  Cervical strain (847 0) (S16 1XXA)   4  Currently pregnant (V22 2) (Z33 1)   5  Encounter for supervision of other normal pregnancy in second trimester (V22 1)   (Z34 82)   6  Head injury (959 01) (S09 90XA)   7  History of recent travel (V49 89) (Z78 9)   8  Migrainous headache without aura (346 10) (G43 009)   9  Pregnancy, obstetrical care (V22 1) (Z34 90)   10  Work related injury (959 9) (Y99 0)    Current Meds   1  Metoclopramide HCl - 10 MG Oral Tablet (Reglan); TAKE 1 TABLET EVERY 6 HOURS   AS NEEDED; Therapy: 33AUL9669 to (Larry Carreno)  Requested for: 28Jun2017; Last   Rx:28Jun2017 Ordered   2  Prenatal Vitamins TABS; Take 1 tablet daily; Therapy: (Recorded:17Apr2017) to Recorded   3  Tylenol Extra Strength 500 MG Oral Tablet; TAKE 3 TABLET Daily PRN; Therapy: (Recorded:17Mar2017) to Recorded   4  Unisom Sleepgels 50 MG Oral Capsule; TAKE 1 CAPSULE Bedtime; Therapy: (Recorded:47Zof6086) to Recorded   5  Vitamin B6 TABS; Take 1 tablet twice daily; Therapy: (Recorded:75Cfh0224) to Recorded    Allergies    1  Flagyl TABS    2   Pollen    Signatures   Electronically signed by : Papa Mitchell RN; Jul 5 2017 11:20AM EST                       (Author)

## 2018-01-18 NOTE — MISCELLANEOUS
Message  pt called stating, "i am not able to tolerate my iron pills " advised pt to attempt to increase iron through iron enriched foods  -suggestions given & advised pt refer to page 13 of preg essent  book  also recommended pt increase Vit C intake  pt also advised to take her pnv daily -pt states "i take the gummies-advised pt look for gummies that contain iron " pt also asked" if she was exposed to HIV in march , if it would show up on hiv lab results that were drawn in jully"  - assure pt her results were negative in julyl 2017  Active Problems    1  Anemia complicating pregnancy (269 03,472 5) (O99 019)   2  Anxiety (300 00) (F41 9)   3  Bacterial vaginosis (616 10,041 9) (N76 0,B96 89)   4  Cerebral concussion (850 9) (S06 0X9A)   5  Cervical strain (847 0) (S16 1XXA)   6  Currently pregnant (V22 2) (Z34 90)   7  Depression (311) (F32 9)   8  Encounter for supervision of other normal pregnancy in third trimester (V22 1) (Z34 83)   9  Head injury (959 01) (S09 90XA)   10  Herpes exposure (V01 79) (Z20 828)   11  History of recent travel (V49 89) (Z78 9)   12  Maternal obesity, antepartum, second trimester (649 13,278 00) (F38 548)   13  Migrainous headache without aura (346 10) (G43 009)   14  Need for Tdap vaccination (V06 1) (Z23)   15  Pregnancy, obstetrical care (V22 1) (Z34 90)   16  Work related injury (959 9) (Y99 0)    Current Meds   1  BusPIRone HCl - 10 MG Oral Tablet; TAKE 1 TABLET 3 TIMES DAILY; Therapy: 22QRQ9341 to (Evaluate:21Pnc1830)  Requested for: 80EZY9583; Last   Rx:08Dte9510 Ordered   2  Ferrous Sulfate 325 (65 Fe) MG Oral Tablet; Take 1 tab daily before bedtime on an   empty stomach or with orange juice; Therapy: 47YUA1663 to (Elizabeth Heck)  Requested for: 64YYC3499; Last   Rx:88Gmw1451 Ordered   3  Prenatal Vitamins TABS; Take 1 tablet daily; Therapy: (Recorded:81Yqw6374) to Recorded   4  Probiotic CAPS; Therapy: (Recorded:98Rmn0984) to Recorded   5   Terconazole 0 4 % Vaginal Cream (Terazol 7); INSERT 1 APPLICATORFUL   INTRAVAGINALLY AT BEDTIME; Therapy: 58FJN0362 to (Evaluate:13Oct2017)  Requested for: 70VZP0478; Last   Rx:09Cxq6002 Ordered   6  Tylenol Extra Strength 500 MG Oral Tablet; TAKE 3 TABLET Daily PRN; Therapy: (Recorded:17Mar2017) to Recorded   7  ValACYclovir HCl - 500 MG Oral Tablet (Valtrex); TAKE 1 TABLET Twice daily for 3 days   for treatment of recurrent infection; Therapy: 21DJE1551 to (Evaluate:08Goy3634)  Requested for: 60Odm1919; Last   Rx:97Srt2075 Ordered    Allergies    1  No Known Drug Allergies    2  Pollen   3  Seasonal  Denied    4   Flagyl TABS    Signatures   Electronically signed by : Mariya Mendez RN; Nov 2 2017  1:53PM EST                       (Author)

## 2018-01-22 VITALS
BODY MASS INDEX: 34.2 KG/M2 | WEIGHT: 193 LBS | SYSTOLIC BLOOD PRESSURE: 104 MMHG | HEIGHT: 63 IN | DIASTOLIC BLOOD PRESSURE: 70 MMHG

## 2018-01-22 VITALS
SYSTOLIC BLOOD PRESSURE: 106 MMHG | HEIGHT: 62 IN | DIASTOLIC BLOOD PRESSURE: 64 MMHG | WEIGHT: 184 LBS | BODY MASS INDEX: 33.86 KG/M2

## 2018-01-22 VITALS
HEIGHT: 63 IN | WEIGHT: 185 LBS | DIASTOLIC BLOOD PRESSURE: 78 MMHG | SYSTOLIC BLOOD PRESSURE: 118 MMHG | BODY MASS INDEX: 32.78 KG/M2

## 2018-01-22 VITALS
WEIGHT: 190 LBS | BODY MASS INDEX: 33.66 KG/M2 | SYSTOLIC BLOOD PRESSURE: 120 MMHG | HEIGHT: 63 IN | DIASTOLIC BLOOD PRESSURE: 62 MMHG

## 2018-01-22 VITALS
HEIGHT: 62 IN | BODY MASS INDEX: 34.78 KG/M2 | SYSTOLIC BLOOD PRESSURE: 122 MMHG | WEIGHT: 189 LBS | DIASTOLIC BLOOD PRESSURE: 72 MMHG

## 2018-01-22 VITALS
BODY MASS INDEX: 34.41 KG/M2 | HEIGHT: 62 IN | WEIGHT: 187 LBS | SYSTOLIC BLOOD PRESSURE: 122 MMHG | DIASTOLIC BLOOD PRESSURE: 76 MMHG

## 2018-01-22 VITALS
HEIGHT: 62 IN | DIASTOLIC BLOOD PRESSURE: 66 MMHG | SYSTOLIC BLOOD PRESSURE: 114 MMHG | WEIGHT: 188 LBS | BODY MASS INDEX: 34.6 KG/M2

## 2018-01-22 VITALS
HEIGHT: 63 IN | WEIGHT: 191 LBS | BODY MASS INDEX: 33.84 KG/M2 | SYSTOLIC BLOOD PRESSURE: 112 MMHG | DIASTOLIC BLOOD PRESSURE: 70 MMHG

## 2018-01-22 VITALS
HEIGHT: 62 IN | SYSTOLIC BLOOD PRESSURE: 118 MMHG | DIASTOLIC BLOOD PRESSURE: 70 MMHG | WEIGHT: 174 LBS | BODY MASS INDEX: 32.02 KG/M2

## 2018-01-22 VITALS
DIASTOLIC BLOOD PRESSURE: 72 MMHG | WEIGHT: 191 LBS | HEIGHT: 63 IN | BODY MASS INDEX: 33.84 KG/M2 | SYSTOLIC BLOOD PRESSURE: 110 MMHG

## 2018-01-22 VITALS
HEIGHT: 62 IN | DIASTOLIC BLOOD PRESSURE: 64 MMHG | SYSTOLIC BLOOD PRESSURE: 112 MMHG | WEIGHT: 186 LBS | BODY MASS INDEX: 34.23 KG/M2

## 2018-01-22 VITALS
DIASTOLIC BLOOD PRESSURE: 80 MMHG | WEIGHT: 188.19 LBS | SYSTOLIC BLOOD PRESSURE: 110 MMHG | BODY MASS INDEX: 34.63 KG/M2 | HEIGHT: 62 IN

## 2018-01-22 VITALS
DIASTOLIC BLOOD PRESSURE: 62 MMHG | BODY MASS INDEX: 33.31 KG/M2 | HEIGHT: 63 IN | WEIGHT: 188 LBS | SYSTOLIC BLOOD PRESSURE: 118 MMHG

## 2018-01-22 VITALS
DIASTOLIC BLOOD PRESSURE: 78 MMHG | SYSTOLIC BLOOD PRESSURE: 120 MMHG | HEIGHT: 63 IN | BODY MASS INDEX: 33.13 KG/M2 | WEIGHT: 187 LBS

## 2018-01-22 VITALS — HEART RATE: 78 BPM

## 2018-01-23 NOTE — PSYCH
Message  Message Free Text Note Form: Jae Rivera canceled her session because she could not find a  for her child  Requested to be put on the cancelation list       Active Problems    1  Anemia complicating pregnancy (104 36,971 8) (O99 019)   2  Anxiety (300 00) (F41 9)   3  Bacterial vaginosis (616 10,041 9) (N76 0,B96 89)   4  Candidiasis (112 9) (B37 9)   5  Cerebral concussion (850 9) (S06 0X9A)   6  Cervical strain (847 0) (S16 1XXA)   7  Depression (311) (F32 9)   8  Head injury (959 01) (S09 90XA)   9  Herpes exposure (V01 79) (Z20 828)   10  History of recent travel (V49 89) (Z78 9)   11  Migrainous headache without aura (346 10) (G43 009)   12  Postop check (V67 00) (Z09)   13  Postpartum depression (408 92,712) (F53)   14  Pregnancy, obstetrical care (V22 1) (Z34 90)   15  Work related injury (959 9) (Y99 0)    Current Meds   1  MetroNIDAZOLE 500 MG Oral Tablet; TAKE 1 TABLET TWICE DAILY UNTIL FINISHED; Therapy: 07RXP8790 to (Mari Barba)  Requested for: 48JXN2576; Last   Rx:28Nov2017 Ordered   2  Percocet TABS (Oxycodone-Acetaminophen); Therapy: (Recorded:55Xkj9788) to Recorded   3  Prenatabs Rx 29-1 MG Oral Tablet; TAKE 1 TABLET DAILY; Therapy: 03QYV3535 to (Evaluate:91Fqf1353)  Requested for: 65PTG5460; Last   Rx:86Buf5196 Ordered   4  Sertraline HCl - 50 MG Oral Tablet (Zoloft); TAKE 1 TABLET DAILY; Therapy: 16YVR8872 to (Evaluate:31Xsu7798)  Requested for: 26XLM8761; Last   Rx:82Hub2912 Ordered   5  Tylenol Extra Strength 500 MG Oral Tablet; TAKE 3 TABLET Daily PRN; Therapy: (Recorded:17Mar2017) to Recorded    Allergies    1  No Known Drug Allergies    2  Pollen   3  Seasonal  Denied    4   Flagyl TABS    Signatures   Electronically signed by : Donn Odell LCSW; Dec 19 2017  2:28PM EST                       (Author)

## 2018-01-23 NOTE — MISCELLANEOUS
Message   Recorded as Task   Date: 12/08/2017 03:53 PM, Created By: Татьяна Mas   Task Name: Care Coordination   Assigned To: [facility],Team   Regarding Patient: Mellissa Campos, Status: In Progress   Comment:    Irene Gifford - 08 Dec 2017 3:53 PM     TASK CREATED  Patient had called the answering service - Her incision is swollen dn she had pain on her left side  Called the pt - stated she spoke with D87 and she saw Mesfin Albrecht today - she removed the staples and she feels a lot better  Advised her if she feels pain or it becomes infected - tender to the touch or inflamed to call back  Irene Gifford - 08 Dec 2017 3:53 PM     TASK IN PROGRESS        Active Problems    1  Anemia complicating pregnancy (473 04,175 0) (O99 019)   2  Anxiety (300 00) (F41 9)   3  Bacterial vaginosis (616 10,041 9) (N76 0,B96 89)   4  Candidiasis (112 9) (B37 9)   5  Cerebral concussion (850 9) (S06 0X9A)   6  Cervical strain (847 0) (S16 1XXA)   7  Depression (311) (F32 9)   8  Head injury (959 01) (S09 90XA)   9  Herpes exposure (V01 79) (Z20 828)   10  History of recent travel (V49 89) (Z78 9)   11  Migrainous headache without aura (346 10) (G43 009)   12  Postop check (V67 00) (Z09)   13  Postpartum depression (080 96,071) (F53)   14  Pregnancy, obstetrical care (V22 1) (Z34 90)   15  Work related injury (959 9) (Y99 0)    Current Meds   1  MetroNIDAZOLE 500 MG Oral Tablet; TAKE 1 TABLET TWICE DAILY UNTIL FINISHED; Therapy: 19PHP0734 to (Nela Carlson)  Requested for: 24FDT2138; Last   Rx:28Nov2017 Ordered   2  Percocet TABS (Oxycodone-Acetaminophen); Therapy: (Recorded:21Bsz9267) to Recorded   3  Prenatabs Rx 29-1 MG Oral Tablet; TAKE 1 TABLET DAILY; Therapy: 99DYK6875 to (Evaluate:40Jxt0117)  Requested for: 56HRX8851; Last   Rx:62Vwe1239 Ordered   4  Sertraline HCl - 50 MG Oral Tablet (Zoloft); TAKE 1 TABLET DAILY;    Therapy: 82ECC4629 to (Evaluate:07Apr2018)  Requested for: 61TMT2188; Last   Rx:87Szb6319 Ordered 5  Tylenol Extra Strength 500 MG Oral Tablet; TAKE 3 TABLET Daily PRN; Therapy: (Recorded:17Mar2017) to Recorded    Allergies    1  No Known Drug Allergies    2  Pollen   3  Seasonal  Denied    4   Flagyl TABS    Signatures   Electronically signed by : Kiran Smith, ; Dec  8 2017  3:53PM EST                       (Author)

## 2018-01-23 NOTE — PSYCH
Treatment Plan Tracking    #1 Treatment Plan not completed within required time limits due to: Client cancelled/ no-showed scheduled appointment            Signatures   Electronically signed by : Gwendolyn Ramsey LCSW; Dec 19 2017  2:28PM EST                       (Author)

## 2018-01-24 VITALS
WEIGHT: 175 LBS | HEIGHT: 62 IN | BODY MASS INDEX: 32.2 KG/M2 | TEMPERATURE: 98.4 F | SYSTOLIC BLOOD PRESSURE: 115 MMHG | DIASTOLIC BLOOD PRESSURE: 80 MMHG

## 2018-02-02 ENCOUNTER — TELEPHONE (OUTPATIENT)
Dept: OBGYN CLINIC | Facility: CLINIC | Age: 23
End: 2018-02-02

## 2018-02-02 NOTE — TELEPHONE ENCOUNTER
Pt called - thinks has a BV - slight itching & discharge w/fishy odor - please advise  Pharmacy is [facility] in [place]

## 2018-02-05 NOTE — TELEPHONE ENCOUNTER
Spoke with pt - symptoms started last week  She has itching, fishy odor and thick white discharge  No burning  No intercourse for about 2 weeks  No OTC product used  Patient states she get BV a lot  Last treated was in November  She was prescribed Terconazole in Jan, but her insurance wouldn't cover it  She would like a refill of Metronidozole tablets  Ok to fill? Please advise  Thanks!

## 2018-02-05 NOTE — TELEPHONE ENCOUNTER
Called pt back - she is aware Rx was sent to pharmacy  Advised her use of medication and cautions while taking it  Advised her is starts to have more itching, to use Monistat 7

## 2018-02-05 NOTE — TELEPHONE ENCOUNTER
Pls notify patient I will send medication over but if no improvement will need to be seen  Also, if she starts to have itching it is probably a vaginal yeast from the metronidazole so she can use monistat 7 at that point   Thanks

## 2018-02-07 DIAGNOSIS — N76.0 ACUTE VAGINITIS: Primary | ICD-10-CM

## 2018-02-09 RX ORDER — METRONIDAZOLE 500 MG/1
500 TABLET ORAL EVERY 12 HOURS SCHEDULED
Qty: 14 TABLET | Refills: 0 | Status: SHIPPED | OUTPATIENT
Start: 2018-02-09 | End: 2018-02-16

## 2018-03-07 NOTE — PROGRESS NOTES
Education  Baby & Me Education 2nd Trimester:   Second Trimester Education provided:   benefits of breastfeeding, importance of exclusive breastfeeding, early initiation of breastfeeding, exclusive breastfeeding for the first 6 months, non-pharmacologic pain relief methods for labor and rooming-in on 24 hour basis  Mother has not registered for prenatal class  Thought has been given to selecting a pediatrician  The mother has not discussed personal preferences with her provider  Prenatal education provided by: Kyra Lucio MA      Signatures   Electronically signed by :  Daja Tsang, ; Aug 22 2017 10:46AM EST                       (Co-author)    Electronically signed by : Shahid Armijo MD; Aug 22 2017  1:01PM EST

## 2018-03-07 NOTE — PROGRESS NOTES
Education  Baby & Me Education 1st Trimester:   First Trimester Education provided:   benefits of breastfeeding, importance of exclusive breastfeeding, early initiation of breastfeeding, exclusive breastfeeding for the first 6 months and Pregnancy Essentials Reference Guide given   The patient is undecided on breastfeeding  Prenatal education provided by: Polo Luong MA      Signatures   Electronically signed by :  Radha Jaquez, ; Jun 27 2017 11:12AM EST                       (Co-author)    Electronically signed by : Prema Castañeda MD; Jun 27 2017 12:18PM EST                       (Author)

## 2018-03-20 ENCOUNTER — APPOINTMENT (EMERGENCY)
Dept: RADIOLOGY | Facility: HOSPITAL | Age: 23
End: 2018-03-20
Payer: COMMERCIAL

## 2018-03-20 ENCOUNTER — HOSPITAL ENCOUNTER (EMERGENCY)
Facility: HOSPITAL | Age: 23
Discharge: HOME | End: 2018-03-20
Attending: EMERGENCY MEDICINE | Admitting: EMERGENCY MEDICINE
Payer: COMMERCIAL

## 2018-03-20 VITALS
WEIGHT: 175 LBS | TEMPERATURE: 98.4 F | HEIGHT: 62 IN | SYSTOLIC BLOOD PRESSURE: 153 MMHG | HEART RATE: 74 BPM | OXYGEN SATURATION: 98 % | DIASTOLIC BLOOD PRESSURE: 62 MMHG | RESPIRATION RATE: 16 BRPM | BODY MASS INDEX: 32.2 KG/M2

## 2018-03-20 DIAGNOSIS — R07.9 CHEST PAIN: Primary | ICD-10-CM

## 2018-03-20 LAB
ATRIAL RATE: 76 BPM
P AXIS: 58 DEGREES
PR INTERVAL: 170 MS
QRS AXIS: 98 DEGREES
QRSD INTERVAL: 98 MS
QT INTERVAL: 378 MS
QTC INTERVAL: 425 MS
SPECIMEN SOURCE: NORMAL
T WAVE AXIS: 39 DEGREES
TROPONIN I BLD-MCNC: 0.07 NG/ML (ref 0–0.08)
VENTRICULAR RATE: 76 BPM

## 2018-03-20 PROCEDURE — 93005 ELECTROCARDIOGRAM TRACING: CPT

## 2018-03-20 PROCEDURE — 84484 ASSAY OF TROPONIN QUANT: CPT

## 2018-03-20 PROCEDURE — 99285 EMERGENCY DEPT VISIT HI MDM: CPT

## 2018-03-20 PROCEDURE — 71046 X-RAY EXAM CHEST 2 VIEWS: CPT

## 2018-03-20 PROCEDURE — 93010 ELECTROCARDIOGRAM REPORT: CPT | Performed by: INTERNAL MEDICINE

## 2018-03-20 RX ORDER — ACETAMINOPHEN 325 MG/1
975 TABLET ORAL ONCE
Status: COMPLETED | OUTPATIENT
Start: 2018-03-20 | End: 2018-03-20

## 2018-03-20 RX ADMIN — ACETAMINOPHEN 975 MG: 325 TABLET, FILM COATED ORAL at 20:43

## 2018-03-21 NOTE — DISCHARGE INSTRUCTIONS
Please return immediately if you developed worsening or other concerning symptoms as instructed otherwise your to follow up with family doctor in 1 week for re-evaluation as instructed     Chest Pain, Ambulatory Care   GENERAL INFORMATION:   Chest pain  can be caused by a range of conditions, from not serious to life-threatening  It may be caused by a heart attack or a blood clot in your lungs  Sometimes chest pain or pressure is caused by poor blood flow to your heart (angina)  Infection, inflammation, or a fracture in the bones or cartilage in your chest can cause pain or discomfort  Chest pain can also be a symptom of a digestive problem, such as acid reflux or a stomach ulcer  Common symptoms include the following:   · Fever or sweating     · Nausea or vomiting     · Shortness of breath     · Discomfort or pressure that spreads from your chest to your back, jaw, or arm     · A racing or slow heartbeat     · Feeling weak, tired, or faint  Seek immediate care for the following symptoms:   · Any of the following signs of a heart attack:      ¨ Squeezing, pressure, or pain in your chest that lasts longer than 5 minutes or returns    ¨ Discomfort or pain in your back, neck, jaw, stomach, or arm     ¨ Trouble breathing     ¨ Nausea or vomiting    ¨ Lightheadedness or a sudden cold sweat, especially with trouble breathing         · Chest discomfort that gets worse, even with medicine    · Coughing or vomiting blood    · Black or bloody bowel movements     · Vomiting that does not stop, or pain when you swallow  Treatment for chest pain  may include medicine to treat your symptoms while he determines the cause of your chest pain  You may also need any of the following:  · Antiplatelets , such as aspirin, help prevent blood clots  Take your antiplatelet medicine exactly as directed  These medicines make it more likely for you to bleed or bruise   If you are told to take aspirin, do not take acetaminophen or ibuprofen instead  · Prescription pain medicine  may be given  Ask how to take this medicine safely  Do not smoke: If you smoke, it is never too late to quit  Smoking increases your risk for a heart attack and other heart and lung conditions  Ask your healthcare provider for information about how to stop smoking if you need help  Follow up with your healthcare provider as directed: You may need more tests  You may be referred to a specialist, such as a cardiologist or gastroenterologist  Write down your questions so you remember to ask them during your visits  CARE AGREEMENT:   You have the right to help plan your care  Learn about your health condition and how it may be treated  Discuss treatment options with your caregivers to decide what care you want to receive  You always have the right to refuse treatment  The above information is an  only  It is not intended as medical advice for individual conditions or treatments  Talk to your doctor, nurse or pharmacist before following any medical regimen to see if it is safe and effective for you  © 2014 [address] is for End User's use only and may not be sold, redistributed or otherwise used for commercial purposes  All illustrations and images included in CareNotes® are the copyrighted property of A D A M , Inc  or Garrick Mario

## 2018-03-21 NOTE — ED PROVIDER NOTES
History  Chief Complaint   Patient presents with    Chest Pain     Pt with chest pain x's 2 days in the middle of chest      25-year-old female denies past medical history presenting chief complaint of chest pain  She states for last 2 or 3 days she has had intermittent chest tightness that is nonradiating without exacerbating remitting factors and no associated symptoms he has had approximately 2-3 episodes over last 2-3 days of this, this episode began around 4 o'clock this afternoon is been constant since onset  Again no exacerbating or remitting factors no radiation of symptoms and no associated symptoms with this she denies all risk factors for ACS including early family history tobacco or cocaine use she denies all risk factors or signs of symptoms of DVT or PE she has approximately 4 months postpartum  She has no recent exertional chest pain or cardiac equivalents this comes on at rest she states that it does feel like anxiety although she is unsure wanted to be evaluated  She otherwise denies recent viral symptoms headache dizziness diaphoresis near syncope shortness of breath cough production or hemoptysis nausea vomiting anorexia abdominal pain change in bowels or bladder leg swelling calf rashes or other associated symptoms  Her last normal menstrual period ended 3 days ago            Prior to Admission Medications   Prescriptions Last Dose Informant Patient Reported? Taking?    Prenatal Multivit-Min-Fe-FA (PRENATAL VITAMINS) 0 8 MG tablet  Self Yes No   Sig: Take by mouth   Probiotic Product (PROBIOTIC-10) CAPS  Self Yes No   Sig: Take by mouth   benzocaine-menthol-lanolin-aloe (DERMOPLAST) 20-0 5 % topical spray   No No   Sig: Apply 1 application topically every 6 (six) hours as needed for mild pain or irritation   docusate sodium (COLACE) 100 mg capsule   No No   Sig: Take 1 capsule by mouth 2 (two) times a day   hydrocortisone 1 % cream   No No   Sig: Apply 1 application topically daily as needed for irritation or rash   ibuprofen (MOTRIN) 600 mg tablet   No No   Sig: Take 1 tablet by mouth every 6 (six) hours as needed for mild pain   witch hazel-glycerin (TUCKS) topical pad   No No   Sig: Apply 1 pad topically every 4 (four) hours as needed for irritation      Facility-Administered Medications: None       Past Medical History:   Diagnosis Date    Anxiety     Herpes     Migraine     Postpartum depression     Varicella     vaccine       Past Surgical History:   Procedure Laterality Date    INDUCED       VT  DELIVERY ONLY N/A 2017    Procedure:  SECTION (); Surgeon: Mere Fitzgerald DO;  [facility];  Service: Obstetrics       Family History   Problem Relation Age of Onset    Asthma Mother     Depression Mother     Diabetes Maternal Aunt     Hypertension Maternal Aunt     Cancer Maternal Grandmother     Diabetes Maternal Grandmother     Hypertension Maternal Grandmother      I have reviewed and agree with the history as documented  Social History   Substance Use Topics    Smoking status: Former Smoker     Quit date:     Smokeless tobacco: Never Used    Alcohol use No        Review of Systems   Constitutional: Negative for chills and fever  HENT: Negative for rhinorrhea and sore throat  Eyes: Negative for photophobia and pain  Respiratory: Positive for chest tightness  Negative for cough and shortness of breath  Cardiovascular: Negative for chest pain and palpitations  Gastrointestinal: Negative for abdominal pain, diarrhea, nausea and vomiting  Endocrine: Negative for polydipsia and polyphagia  Genitourinary: Negative for dysuria, frequency and urgency  Musculoskeletal: Negative for arthralgias, back pain and myalgias  Skin: Negative for color change and rash  Neurological: Negative for dizziness and weakness  Hematological: Negative for adenopathy  Does not bruise/bleed easily     Psychiatric/Behavioral: Negative for agitation and behavioral problems  All other systems reviewed and are negative  Physical Exam  ED Triage Vitals [03/20/18 1844]   Temperature Pulse Respirations Blood Pressure SpO2   98 4 °F (36 9 °C) 74 16 153/62 98 %      Temp Source Heart Rate Source Patient Position - Orthostatic VS BP Location FiO2 (%)   Oral Monitor Sitting Left arm --      Pain Score       8           Orthostatic Vital Signs  Vitals:    03/20/18 1844   BP: 153/62   Pulse: 74   Patient Position - Orthostatic VS: Sitting       Physical Exam   Constitutional: She is oriented to person, place, and time  She appears well-developed and well-nourished  No distress  Very well-appearing in no acute distress   HENT:   Head: Normocephalic and atraumatic  Eyes: EOM are normal  Pupils are equal, round, and reactive to light  Neck: Normal range of motion  Neck supple  No tracheal deviation present  Cardiovascular: Normal rate, regular rhythm and normal heart sounds  Exam reveals no gallop and no friction rub  No murmur heard  Pulmonary/Chest: Effort normal and breath sounds normal  She has no wheezes  She has no rales  Abdominal: Soft  Bowel sounds are normal  She exhibits no distension  There is no tenderness  There is no rebound and no guarding  Musculoskeletal: Normal range of motion  She exhibits no edema or tenderness  Neurological: She is alert and oriented to person, place, and time  No cranial nerve deficit  She exhibits normal muscle tone  Coordination normal    Skin: Skin is warm and dry  No rash noted  Psychiatric: She has a normal mood and affect  Her behavior is normal    Nursing note and vitals reviewed        ED Medications  Medications   acetaminophen (TYLENOL) tablet 975 mg (975 mg Oral Given 3/20/18 2043)       Diagnostic Studies  Results Reviewed     Procedure Component Value Units Date/Time    POCT troponin [[id number]]  (Normal) Collected:  03/20/18 2054    Lab Status:  Final result Updated: 03/20/18 2108     POC Troponin I 0 07 ng/ml      Specimen Type VENOUS    Narrative:         [employer] i-Stat handheld analyzer 99% cutoff is > 0 08ng/mL in network Emergency Departments    o cTnI 99% cutoff is useful only when applied to patients in the clinical setting of myocardial ischemia  o cTnI 99% cutoff should be interpreted in the context of clinical history, ECG findings and possibly cardiac imaging to establish correct diagnosis  o cTnI 99% cutoff may be suggestive but clearly not indicative of a coronary event without the clinical setting of myocardial ischemia  XR chest 2 views   ED Interpretation by Berta Chen DO (03/20 2045)   No acute abnormality      Final Result by Krish Mills MD (03/21 0028)      No acute cardiopulmonary disease              Workstation performed: [id number]                    Procedures  Procedures       Phone Contacts  ED Phone Contact    ED Course  ED Course as of Mar 22 1036   Tue Mar 20, 2018   2026 EKG reviewed normal sinus rhythm 76 beats per minute rightward axis, normal intervals, no acute ST segment changes    2114 Pt with 5 hours of constant sx, ekg wnl, trop negative, HEART score 0, will see with close return follow-up instructions patient agreeable plan          HEART Risk Score    Flowsheet Row Most Recent Value   History  0 Filed at: 03/20/2018 2113   ECG  0 Filed at: 03/20/2018 2113   Age  0 Filed at: 03/20/2018 2113   Risk Factors  0 Filed at: 03/20/2018 2113   Troponin  0 Filed at: 03/20/2018 2113   Heart Score Risk Calculator   History  0 Filed at: 03/20/2018 2113   ECG  0 Filed at: 03/20/2018 2113   Age  0 Filed at: 03/20/2018 2113   Risk Factors  0 Filed at: 03/20/2018 2113   Troponin  0 Filed at: 03/20/2018 2113   HEART Score  0 Filed at: 03/20/2018 2113   HEART Score  0 Filed at: 03/20/2018 2113                            MDM  Number of Diagnoses or Management Options  Diagnosis management comments: 27-year-old female without past medical history with 2-3 days of intermittent chest tightness, comes and goes at rest, left several hours at a time is not exertional, she has no associated symptoms with this, history of anxiety feels similar, denies all risk factors for ACS, denies all risk factors signs of symptoms of DVT or PE, she has approximately 4 months postpartum at this point, the symptoms are episodic she has no shortness of breath with this, she has no pleuritic chest pain, no recent viral symptoms, on exam she is mildly hypertensive otherwise afebrile normal vital signs clinically well-appearing heart regular without murmur lungs are clear and equal she has no peripheral edema or calf tenderness, will check EKG, chest x-ray, 1 time trop, if negative likely discharge family doctor follow-up close return follow-up instructions    CritCare Time    Disposition  Final diagnoses:   Chest pain     Time reflects when diagnosis was documented in both MDM as applicable and the Disposition within this note     Time User Action Codes Description Comment    3/20/2018  9:16 PM Collette Cisse Greek Add [R07 9] Chest pain       ED Disposition     ED Disposition Condition Comment    Discharge  [address] discharge to home/self care      Condition at discharge: Good        Follow-up Information     Follow up With Specialties Details Why Contact Info Additional Information    [address] [facility] Emergency Department Emergency Medicine  If symptoms worsen at anytime [address] [facility] [address]  [phone] [place] ED, [address] [place] [address], Sarai Hernandez MD Internal Medicine In 1 week For re-check [facility]  [phone]           Discharge Medication List as of 3/20/2018  9:18 PM      CONTINUE these medications which have NOT CHANGED    Details   benzocaine-menthol-lanolin-aloe (DERMOPLAST) 20-0 5 % topical spray Apply 1 application topically every 6 (six) hours as needed for mild pain or irritation, Starting Mon 12/4/2017, No Print      docusate sodium (COLACE) 100 mg capsule Take 1 capsule by mouth 2 (two) times a day, Starting Mon 12/4/2017, No Print      hydrocortisone 1 % cream Apply 1 application topically daily as needed for irritation or rash, Starting Mon 12/4/2017, No Print      ibuprofen (MOTRIN) 600 mg tablet Take 1 tablet by mouth every 6 (six) hours as needed for mild pain, Starting Mon 12/4/2017, No Print      Prenatal Multivit-Min-Fe-FA (PRENATAL VITAMINS) 0 8 MG tablet Take by mouth, Historical Med      Probiotic Product (PROBIOTIC-10) CAPS Take by mouth, Historical Med      witch hazel-glycerin (TUCKS) topical pad Apply 1 pad topically every 4 (four) hours as needed for irritation, Starting Mon 12/4/2017, No Print             Outpatient Discharge Orders  Stress test only, exercise   Standing Status: Future  Standing Exp   Date: 03/20/22         ED Provider  Electronically Signed by           Krystal Jones DO  03/22/18 1036

## 2018-03-26 NOTE — H&P
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, Anemia in pregnancy, Anxiety and Depression on Buspirone, BV and Yeast infection currently being treated  Review of Systems   Constitutional: Negative for chills and fever  Eyes: Negative for visual disturbance  Respiratory: Negative for cough, shortness of breath and wheezing  Cardiovascular: Negative for chest pain, palpitations and leg swelling  Gastrointestinal: Positive for diarrhea  Negative for nausea and vomiting  CTX   Genitourinary: Positive for vaginal discharge and vaginal pain  Negative for vaginal bleeding  LOF   Musculoskeletal:        Bilateral thigh pain   Neurological: Negative for headaches  Historical Information   OB History    Para Term  AB Living   4 1 1 0 2 1   SAB TAB Ectopic Multiple Live Births   1 1     1      # Outcome Date GA Lbr David/2nd Weight Sex Delivery Anes PTL Lv   4 Current            3 Term 2015 39w0d  3175 g (7 lb) F    JAMEY   2 TAB 2012           1 SAB                 Baby complications/comments: EFW 7lbs 5oz by leopolds  History reviewed  No pertinent past medical history  History reviewed  No pertinent surgical history    Social History   History   Alcohol Use No     History   Drug Use No     History   Smoking Status    Never Smoker   Smokeless Tobacco    Never Used     Family History: non-contributory    Meds/Allergies   {  Prescriptions Prior to Admission   Medication    ciprofloxacin-hydrocortisone (CIPRO HC) otic suspension    Prenatal Multivit-Min-Fe-FA (PRENATAL VITAMINS) 0 8 MG tablet    Probiotic Product (PROBIOTIC-10) CAPS     No Known Allergies    Objective   Vitals: not currently breastfeeding  There is no height or weight on file to calculate BMI  Invasive Devices     Peripheral Intravenous Line            Peripheral IV 12/01/17 Left Hand less than 1 day                Physical Exam   Constitutional: She is oriented to person, place, and time  She appears well-developed and well-nourished  No distress  Uncomfortable during contractions   HENT:   Head: Normocephalic and atraumatic  Cardiovascular: Normal rate, regular rhythm and normal heart sounds  Exam reveals no gallop and no friction rub  No murmur heard  Pulmonary/Chest: Breath sounds normal  No respiratory distress  She has no wheezes  She has no rales  Abdominal: Bowel sounds are normal    Gravid uterus,  obese   Genitourinary: Vagina normal    Genitourinary Comments: Grossly ruptured   Neurological: She is alert and oriented to person, place, and time  Skin: She is not diaphoretic  Psychiatric: She has a normal mood and affect   Her behavior is normal      Vaginal Exam  Dilation: 3-4  Effacement (%): 90  Station: -3  Presentation: Vertex  Method: Manual  OB Examiner: Lupe Fair  Membranes: Ruptured  FHR: Baseline: 130 bpm, Variability: Good {> 6 bpm), Accelerations: Reactive and Decelerations: occasional variable  [place]: Frequency: Every 2-3 minutes    Prenatal Labs:   Blood Type: O  , D (Rh type): positive  , Antibody Screen: negative , Varicella: positive history    , Rubella: immune     , VDRL/RPR:   Lab Results   Component Value Date/Time    RPR Non-Reactive 09/13/2017 11:44 AM    , HIV:   Lab Results   Component Value Date/Time    HIV-1/HIV-2 Ab Non-Reactive 11/08/2017 11:37 AM     , Chlamydia: negative  , Gonorrhea:   Lab Results   Component Value Date/Time    N gonorrhoeae, DNA Probe N  gonorrhoeae Amplified DNA Negative 2017     , Group B Strep:    Lab Results   Component Value Date/Time    Strep Grp B PCR Positive for Beta Hemolytic Strep Grp B by PCR (A) 2017 04:38 PM          Imaging, EKG, Pathology, and Other Studies: I have personally reviewed pertinent reports        Assessment/Plan     Assessment:  IUP at 39w6d in Labor, SROM  Plan:  1) Admit to L&D  2) CBC, T&S, RPR  3) IV LR for hydration  4) Penicillin ofr GBS ppx  5) Epidural at pt request  6) Expectant management  7) Anticipate      D/w Dr Efren Carlos MD  OBGYN, PGY-1  2017 1:32 AM impaired postural control/decreased strength/decreased endurance

## 2018-03-27 ENCOUNTER — HOSPITAL ENCOUNTER (OUTPATIENT)
Dept: NON INVASIVE DIAGNOSTICS | Facility: HOSPITAL | Age: 23
Discharge: HOME | End: 2018-03-27
Attending: EMERGENCY MEDICINE
Payer: COMMERCIAL

## 2018-03-27 DIAGNOSIS — R07.9 CHEST PAIN: ICD-10-CM

## 2018-03-27 LAB
CHEST PAIN STATEMENT: NORMAL
MAX DIASTOLIC BP: 58 MMHG
MAX HEART RATE: 181 BPM
MAX PREDICTED HEART RATE: 197 BPM
MAX. SYSTOLIC BP: 156 MMHG
PROTOCOL NAME: NORMAL
TARGET HR FORMULA: NORMAL
TEST INDICATION: NORMAL
TIME IN EXERCISE PHASE: NORMAL

## 2018-03-27 PROCEDURE — 93018 CV STRESS TEST I&R ONLY: CPT

## 2018-03-27 PROCEDURE — 93017 CV STRESS TEST TRACING ONLY: CPT

## 2018-03-27 PROCEDURE — 93016 CV STRESS TEST SUPVJ ONLY: CPT

## 2018-03-28 NOTE — ED PROVIDER NOTES
Call back note (3/28/18): Time of call: 1400    Called pt to inform her of negative stress test  She has follow up scheduled with [facility] practice  Return parameters provided  Pt understands and agrees with plan         Keily Madrid  03/28/18 0840

## 2018-04-09 ENCOUNTER — TELEPHONE (OUTPATIENT)
Dept: OBGYN CLINIC | Facility: CLINIC | Age: 23
End: 2018-04-09

## 2018-04-09 DIAGNOSIS — B96.89 BACTERIAL VAGINOSIS: Primary | ICD-10-CM

## 2018-04-09 DIAGNOSIS — N76.0 BACTERIAL VAGINOSIS: Primary | ICD-10-CM

## 2018-04-09 RX ORDER — METRONIDAZOLE 500 MG/1
500 TABLET ORAL EVERY 12 HOURS SCHEDULED
Qty: 14 TABLET | Refills: 0 | Status: SHIPPED | OUTPATIENT
Start: 2018-04-09 | End: 2018-04-16

## 2018-04-09 NOTE — TELEPHONE ENCOUNTER
Pls notify I escribed metronidazole tabs and she could use monistat 7 if she develops vaginal itching from the antibacterial  She should be seen if no improvement with medication   Thx

## 2018-04-09 NOTE — TELEPHONE ENCOUNTER
Pt called the office stating that she has been having recurring yeast infections and vaginosis  Pt states she was treated for it before and wants what she was prescribed originally sent to her pharm  Pt did not know the name of the rx that was prescribed  Pt states that she thought that symptoms were going away at first, but they came back and are worsening  Pt was last seen by Jose Ramon Curry in Dec 2017  Pt would like a call when the rx has been sent to the pharm  Pharm: [facility] in [address] [place] [address] number to reach pt: [phone]

## 2018-04-09 NOTE — TELEPHONE ENCOUNTER
Patient aware Rx was sent to pharmacy - gave directions on use and cautions  If having sx a week after to call back to make an appointment

## 2018-04-09 NOTE — TELEPHONE ENCOUNTER
Patient returned call - symptoms started 2 days ago - thick white cottage cheese discharge, fishy odor, itching, but no burning  No OTC product used and no recent intercourse  She would like an Rx  Please advise  Thanks!

## 2018-04-25 ENCOUNTER — OFFICE VISIT (OUTPATIENT)
Dept: OBGYN CLINIC | Age: 23
End: 2018-04-25
Payer: COMMERCIAL

## 2018-04-25 VITALS — BODY MASS INDEX: 32.19 KG/M2 | WEIGHT: 176 LBS | DIASTOLIC BLOOD PRESSURE: 82 MMHG | SYSTOLIC BLOOD PRESSURE: 132 MMHG

## 2018-04-25 DIAGNOSIS — N76.0 BACTERIAL VAGINOSIS: Primary | ICD-10-CM

## 2018-04-25 DIAGNOSIS — N94.9 VAGINAL DISCOMFORT: ICD-10-CM

## 2018-04-25 DIAGNOSIS — B96.89 BACTERIAL VAGINOSIS: Primary | ICD-10-CM

## 2018-04-25 PROCEDURE — 87510 GARDNER VAG DNA DIR PROBE: CPT | Performed by: OBSTETRICS & GYNECOLOGY

## 2018-04-25 PROCEDURE — 87660 TRICHOMONAS VAGIN DIR PROBE: CPT | Performed by: OBSTETRICS & GYNECOLOGY

## 2018-04-25 PROCEDURE — 99213 OFFICE O/P EST LOW 20 MIN: CPT | Performed by: OBSTETRICS & GYNECOLOGY

## 2018-04-25 PROCEDURE — 87480 CANDIDA DNA DIR PROBE: CPT | Performed by: OBSTETRICS & GYNECOLOGY

## 2018-04-25 NOTE — PROGRESS NOTES
Patient is here with vaginal discomfort with intercourse vaginal discharge and odor is discharge   she also feels some burning with urination   2 weeks ago she was treated for BV with Flagyl     physical exam:      Normal vulva vagina   small amount of discharge noted   testing for vaginal infection was obtained    Cervix appears healthy   normal size anteverted flexed uterus no tenderness   no adnexal masses or tenderness     impression possible vaginal infection   will plan await results of testing

## 2018-04-25 NOTE — PROGRESS NOTES
Patient presents today for a problem visit  Called 2 wks ago for medication for Bv, which she has a history of  After coitus felt like she has vaginal dryness, Vaginal burning like she has a UTI, Fishy odor

## 2018-04-27 LAB
CANDIDA RRNA VAG QL PROBE: POSITIVE
G VAGINALIS RRNA GENITAL QL PROBE: POSITIVE
T VAGINALIS RRNA GENITAL QL PROBE: NEGATIVE

## 2018-04-30 ENCOUNTER — TELEPHONE (OUTPATIENT)
Dept: OBGYN CLINIC | Facility: CLINIC | Age: 23
End: 2018-04-30

## 2018-04-30 RX ORDER — METRONIDAZOLE 500 MG/1
500 TABLET ORAL EVERY 12 HOURS SCHEDULED
Qty: 14 TABLET | Refills: 0 | Status: SHIPPED | OUTPATIENT
Start: 2018-04-30 | End: 2018-05-07

## 2018-04-30 NOTE — TELEPHONE ENCOUNTER
Patient calling for culture results that were done last week  Results available  Please review and advise  Thanks!

## 2018-04-30 NOTE — PROGRESS NOTES
Spoke with patient about her lab reports    Prescribed Flagyl for BV  Patient will use over-the-counter Monistat for the yeast infection

## 2018-05-07 ENCOUNTER — APPOINTMENT (OUTPATIENT)
Dept: LAB | Facility: CLINIC | Age: 23
End: 2018-05-07
Payer: COMMERCIAL

## 2018-05-07 DIAGNOSIS — N39.0 URINARY TRACT INFECTION WITHOUT HEMATURIA, SITE UNSPECIFIED: Primary | ICD-10-CM

## 2018-05-07 DIAGNOSIS — N39.0 URINARY TRACT INFECTION WITHOUT HEMATURIA, SITE UNSPECIFIED: ICD-10-CM

## 2018-05-07 LAB
BACTERIA UR QL AUTO: ABNORMAL /HPF
BILIRUB UR QL STRIP: NEGATIVE
CLARITY UR: ABNORMAL
COLOR UR: ABNORMAL
GLUCOSE UR STRIP-MCNC: NEGATIVE MG/DL
HGB UR QL STRIP.AUTO: ABNORMAL
KETONES UR STRIP-MCNC: NEGATIVE MG/DL
LEUKOCYTE ESTERASE UR QL STRIP: ABNORMAL
NITRITE UR QL STRIP: POSITIVE
NON-SQ EPI CELLS URNS QL MICRO: ABNORMAL /HPF
PH UR STRIP.AUTO: 6 [PH] (ref 4.5–8)
PROT UR STRIP-MCNC: ABNORMAL MG/DL
RBC #/AREA URNS AUTO: ABNORMAL /HPF
SP GR UR STRIP.AUTO: 1.02 (ref 1–1.03)
UROBILINOGEN UR QL STRIP.AUTO: 0.2 E.U./DL
WBC #/AREA URNS AUTO: ABNORMAL /HPF

## 2018-05-07 PROCEDURE — 87186 SC STD MICRODIL/AGAR DIL: CPT

## 2018-05-07 PROCEDURE — 81001 URINALYSIS AUTO W/SCOPE: CPT

## 2018-05-07 PROCEDURE — 87086 URINE CULTURE/COLONY COUNT: CPT

## 2018-05-07 RX ORDER — NITROFURANTOIN 25; 75 MG/1; MG/1
CAPSULE ORAL
Qty: 10 CAPSULE | Refills: 0 | Status: SHIPPED | OUTPATIENT
Start: 2018-05-07 | End: 2018-06-22 | Stop reason: ALTCHOICE

## 2018-05-07 NOTE — TELEPHONE ENCOUNTER
Rx for u/a to [facility] lab in [facility]  Rx to EHR   Pt aware

## 2018-05-07 NOTE — TELEPHONE ENCOUNTER
Christin, this is your patient, she was seen by Dr Jennifer Goss recently and treated for BV/yeast  She said she told him she had UTI symptoms also  She said they are getting worse  She complains of pain with urination, a bad odor to her urine and pressure to go after emptying bladder  Please advise  She has NKDA and uses [employer]

## 2018-05-09 ENCOUNTER — TELEPHONE (OUTPATIENT)
Dept: OBGYN CLINIC | Facility: CLINIC | Age: 23
End: 2018-05-09

## 2018-05-09 LAB — BACTERIA UR CULT: ABNORMAL

## 2018-05-09 NOTE — TELEPHONE ENCOUNTER
Patient is aware of culture results and advised pt to continue taking the Macrobid as directed  If still feeling sx 48-72 hours after last dose to call

## 2018-05-09 NOTE — TELEPHONE ENCOUNTER
----- Message from Adali Tavera, [address] sent at 5/9/2018 12:28 PM EDT -----  Pls notify her urine cx showed an infection so she should complete her abx and call if not better after treatment   Thx

## 2018-06-22 ENCOUNTER — OFFICE VISIT (OUTPATIENT)
Dept: OBGYN CLINIC | Facility: MEDICAL CENTER | Age: 23
End: 2018-06-22
Payer: COMMERCIAL

## 2018-06-22 VITALS — WEIGHT: 176 LBS | DIASTOLIC BLOOD PRESSURE: 76 MMHG | SYSTOLIC BLOOD PRESSURE: 122 MMHG | BODY MASS INDEX: 32.19 KG/M2

## 2018-06-22 DIAGNOSIS — B96.89 BACTERIAL VAGINOSIS: ICD-10-CM

## 2018-06-22 DIAGNOSIS — Z72.51 RISKY SEXUAL BEHAVIOR: ICD-10-CM

## 2018-06-22 DIAGNOSIS — N89.8 VAGINAL DISCHARGE: ICD-10-CM

## 2018-06-22 DIAGNOSIS — N76.0 BACTERIAL VAGINOSIS: ICD-10-CM

## 2018-06-22 DIAGNOSIS — Z77.21 PERSONAL HISTORY OF EXPOSURE TO POTENTIALLY HAZARDOUS BODY FLUIDS: Primary | ICD-10-CM

## 2018-06-22 DIAGNOSIS — Z30.09 ENCOUNTER FOR OTHER GENERAL COUNSELING OR ADVICE ON CONTRACEPTION: ICD-10-CM

## 2018-06-22 PROBLEM — F32.A DEPRESSION: Status: ACTIVE | Noted: 2017-10-02

## 2018-06-22 PROBLEM — S09.90XA HEAD INJURY: Status: ACTIVE | Noted: 2017-03-17

## 2018-06-22 PROBLEM — S06.0X9A CEREBRAL CONCUSSION: Status: ACTIVE | Noted: 2017-03-17

## 2018-06-22 PROBLEM — O99.212 MATERNAL OBESITY, ANTEPARTUM, SECOND TRIMESTER: Status: ACTIVE | Noted: 2017-08-04

## 2018-06-22 PROBLEM — Z3A.39 39 WEEKS GESTATION OF PREGNANCY: Status: RESOLVED | Noted: 2017-12-01 | Resolved: 2018-06-22

## 2018-06-22 PROBLEM — S16.1XXA CERVICAL STRAIN: Status: ACTIVE | Noted: 2017-03-17

## 2018-06-22 PROBLEM — G43.009 MIGRAINOUS HEADACHE WITHOUT AURA: Status: ACTIVE | Noted: 2017-03-17

## 2018-06-22 PROBLEM — O99.820 GROUP B STREPTOCOCCUS CARRIER, ANTEPARTUM: Status: RESOLVED | Noted: 2017-12-01 | Resolved: 2018-06-22

## 2018-06-22 PROBLEM — O99.019 ANEMIA COMPLICATING PREGNANCY: Status: ACTIVE | Noted: 2017-10-06

## 2018-06-22 PROBLEM — F41.9 ANXIETY: Status: ACTIVE | Noted: 2017-06-28

## 2018-06-22 PROBLEM — S06.0XAA CEREBRAL CONCUSSION: Status: ACTIVE | Noted: 2017-03-17
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
## 2018-06-22 PROBLEM — O99.019 ANEMIA COMPLICATING PREGNANCY: Status: RESOLVED | Noted: 2017-10-06 | Resolved: 2018-06-22

## 2018-06-22 PROBLEM — B37.9 CANDIDIASIS: Status: ACTIVE | Noted: 2017-11-28

## 2018-06-22 PROBLEM — O99.212 MATERNAL OBESITY, ANTEPARTUM, SECOND TRIMESTER: Status: RESOLVED | Noted: 2017-08-04 | Resolved: 2018-06-22

## 2018-06-22 LAB — SL AMB POCT URINE HCG: NEGATIVE

## 2018-06-22 PROCEDURE — 87210 SMEAR WET MOUNT SALINE/INK: CPT | Performed by: NURSE PRACTITIONER

## 2018-06-22 PROCEDURE — 81025 URINE PREGNANCY TEST: CPT | Performed by: NURSE PRACTITIONER

## 2018-06-22 PROCEDURE — 99213 OFFICE O/P EST LOW 20 MIN: CPT | Performed by: NURSE PRACTITIONER

## 2018-06-22 PROCEDURE — 87591 N.GONORRHOEAE DNA AMP PROB: CPT | Performed by: NURSE PRACTITIONER

## 2018-06-22 PROCEDURE — 83986 ASSAY PH BODY FLUID NOS: CPT | Performed by: NURSE PRACTITIONER

## 2018-06-22 PROCEDURE — 87491 CHLMYD TRACH DNA AMP PROBE: CPT | Performed by: NURSE PRACTITIONER

## 2018-06-22 RX ORDER — BENZOYL PEROXIDE 50 MG/ML
LIQUID TOPICAL
Refills: 0 | COMMUNITY
Start: 2018-05-07 | End: 2018-09-19 | Stop reason: ALTCHOICE

## 2018-06-22 RX ORDER — CLINDAMYCIN PHOSPHATE 10 UG/ML
LOTION TOPICAL
Refills: 0 | COMMUNITY
Start: 2018-05-05 | End: 2018-09-19 | Stop reason: ALTCHOICE

## 2018-06-22 RX ORDER — METRONIDAZOLE 7.5 MG/G
GEL VAGINAL
Qty: 70 G | Refills: 1 | Status: SHIPPED | OUTPATIENT
Start: 2018-06-22 | End: 2018-12-03

## 2018-06-22 RX ORDER — TRETINOIN 1 MG/G
CREAM TOPICAL
Refills: 0 | COMMUNITY
Start: 2018-05-05 | End: 2018-09-19 | Stop reason: ALTCHOICE

## 2018-06-22 RX ORDER — METRONIDAZOLE 500 MG/1
500 TABLET ORAL EVERY 12 HOURS SCHEDULED
Qty: 14 TABLET | Refills: 0 | Status: SHIPPED | OUTPATIENT
Start: 2018-06-22 | End: 2018-06-29

## 2018-06-22 NOTE — PROGRESS NOTES
[facility] [place]  Harriet Iverson 20 y o  SUBJECTIVE    HPI: Cezar Bowie is a 20 y o   female presenting today for acute office visit  Her chief complaint is an uncomfortable feeling in her vagina  She would also like STD screening  The vaginal discomfort began a couple of days ago  Has some pain after urination  There is a vaginal discharge, grayish in color, with a fishy odor that is worse after intercourse  No pelvic pain  No changes in vulvar or vaginal hygiene  Also has some internal & external vaginal itching that began around the same time  Her history is significant for recurrent culture-confirmed BV and yeast (most recently on ) as well as chlamydia  Felicia Sánchez is sexually active with a new male partner of about 3 mos - a couple of occasions they did not use condoms  LMP 18, regular periods  Does not want another baby but does not do well on hormonal contraception (has tried Paragard and had significant menorrhagia) - verbalized that she needs to do better with consistent condom use and is motivated to do so  Denies fever or chills  The following portions of the patient's history were reviewed and updated as appropriate: allergies, current medications, past medical history, past social history and problem list     ROS  General: negative for chills or fever   Respiratory: no cough, shortness of breath, or wheezing  Cardiovascular: no chest pain or dyspnea on exertion  Gastrointestinal: no abdominal pain, change in bowel habits, or black or bloody stools  Urinary: As Noted in HPI  Genitourinary: As Noted in HPI  Neurological: negative      OBJECTIVE    /76 (BP Location: Left arm)   Wt 79 8 kg (176 lb)   LMP 2018   Breastfeeding?  No   BMI 32 19 kg/m²      Physical Exam  General appearance: alert and oriented, in no acute distress  Skin: Skin color, texture, turgor normal  No rashes or lesions  Neurologic: Grossly normal  Vulva: normal appearing vulva with no masses, tenderness or lesions  Vagina: thin, white homogenous vaginal discharge, moderate amount  Cervix: normal appearing cervix without discharge or lesions and no CMT  Uterus: uterus is normal size, shape, consistency and nontender  Adnexa: normal adnexa in size, nontender and no masses    Vaginal discharge pH: 5  Wet mount findings: clue cells: present   Amine Whiff test: Positive    Lab Results:   Office Visit on 06/22/2018   Component Date Value     URINE HCG 06/22/2018 negative     WET MOUNT 06/22/2018 clue cells(+)         ASSESSMENT  Bacterial Vaginosis      Discussion/Counseling  Discussed that bacterial vaginosis is a condition of altered vaginal environment and is not classified as a sexually transmitted disease  When lactobacilli are lost, the vaginal pH rises and overgrowth of vaginal anaerobes results  BV may be a risk factor for transmission of HIV  Sexual activity, other STIs, douching, and smoking are risk factors for BV  The Amsel criteria for BV diagnosis was discussed (classic vaginal discharge, elevated pH, clue cells, fishy odor/amine reaction)  She was advised that the treatment of BV is metronidazole, either vaginal gel or oral, and that alcohol must be avoided during therapy and for 24 hours after completion  PLAN  1  Flagyl 500mg PO BID x 7d (would try Clindamycin gel given recurrent BV however she relies solely on latex condoms for contraception and it may weaken the condom)  2  Followed by 2x/weekly Metrogel 0 75% suppressive therapy x 6mos  3  Condom use 100% of the time  4  GC/CT cultures today  5  Given lab slips for serum STI testing  6  Vulvar hygiene handout given  7  Return as clinically indicated        All questions were answered & Kleber Arriola expressed understanding      Jose Wilson

## 2018-06-24 LAB — SL AMB POCT WET MOUNT: ABNORMAL

## 2018-06-25 ENCOUNTER — TELEPHONE (OUTPATIENT)
Dept: OBGYN CLINIC | Facility: CLINIC | Age: 23
End: 2018-06-25

## 2018-06-25 ENCOUNTER — LAB (OUTPATIENT)
Dept: LAB | Facility: CLINIC | Age: 23
End: 2018-06-25
Payer: COMMERCIAL

## 2018-06-25 DIAGNOSIS — Z77.21 PERSONAL HISTORY OF EXPOSURE TO POTENTIALLY HAZARDOUS BODY FLUIDS: ICD-10-CM

## 2018-06-25 PROCEDURE — 87389 HIV-1 AG W/HIV-1&-2 AB AG IA: CPT

## 2018-06-25 PROCEDURE — 86592 SYPHILIS TEST NON-TREP QUAL: CPT

## 2018-06-25 PROCEDURE — 87340 HEPATITIS B SURFACE AG IA: CPT

## 2018-06-25 PROCEDURE — 86803 HEPATITIS C AB TEST: CPT

## 2018-06-25 PROCEDURE — 36415 COLL VENOUS BLD VENIPUNCTURE: CPT

## 2018-06-26 ENCOUNTER — TELEPHONE (OUTPATIENT)
Dept: OBGYN CLINIC | Facility: CLINIC | Age: 23
End: 2018-06-26

## 2018-06-26 LAB
HBV SURFACE AG SER QL: NORMAL
HCV AB SER QL: NORMAL
RPR SER QL: NORMAL

## 2018-06-26 NOTE — TELEPHONE ENCOUNTER
Pt called in regards to results  Pt would like results from 6/22 and 6/25  I explained to pt some of the results are still pending   Pt would like to know how long it will take to get the HIV test back please advise

## 2018-06-27 ENCOUNTER — TELEPHONE (OUTPATIENT)
Dept: OBGYN CLINIC | Facility: CLINIC | Age: 23
End: 2018-06-27

## 2018-06-27 LAB
CHLAMYDIA DNA CVX QL NAA+PROBE: NORMAL
HIV 1+2 AB+HIV1 P24 AG SERPL QL IA: NORMAL
N GONORRHOEA DNA GENITAL QL NAA+PROBE: NORMAL

## 2018-06-27 NOTE — TELEPHONE ENCOUNTER
Patient called would like to know her test results ,called yesterday and the results were not here   Told patient a nurse would call her back

## 2018-06-27 NOTE — PROGRESS NOTES
Please notify Tesslexx Arredondocynthia that her HIV, Hepatitis, and Syphillis results are all negative  Thank you

## 2018-06-29 ENCOUNTER — TELEPHONE (OUTPATIENT)
Dept: OBGYN CLINIC | Facility: MEDICAL CENTER | Age: 23
End: 2018-06-29

## 2018-06-29 NOTE — TELEPHONE ENCOUNTER
Called and spoke with Maddie Gomez; Notified her that GC/Chlamydia cultures were negative  Pt verbalized understanding

## 2018-06-29 NOTE — TELEPHONE ENCOUNTER
----- Message from [facility] sent at 6/28/2018  3:19 PM EDT -----  Please let Dianeyamilexzoey Rebelkrishna know that her gonorrhea/chlamydia results are negative  Thanks

## 2018-08-06 DIAGNOSIS — R35.0 FREQUENT URINATION: Primary | ICD-10-CM

## 2018-08-06 DIAGNOSIS — R30.0 BURNING WITH URINATION: ICD-10-CM

## 2018-08-06 RX ORDER — SULFAMETHOXAZOLE AND TRIMETHOPRIM 800; 160 MG/1; MG/1
TABLET ORAL
Qty: 6 TABLET | Refills: 0 | Status: SHIPPED | OUTPATIENT
Start: 2018-08-06 | End: 2018-08-08

## 2018-08-06 NOTE — TELEPHONE ENCOUNTER
Spoke with pt - symptoms started last night  She has frequent urination, pain after urination and strong urine odor  No Abdominal pain or lower back pain  She is going for u/a and urine culture  Has no allergy to bactrim  Per Protocol Rx for Bactrim DS in pt chart  Please sign  Thanks!

## 2018-08-06 NOTE — TELEPHONE ENCOUNTER
Pt called in regards to uti symtoms   Pt is very uncomfortable please advise rx [facility] [place]

## 2018-08-27 ENCOUNTER — OFFICE VISIT (OUTPATIENT)
Dept: OBGYN CLINIC | Facility: MEDICAL CENTER | Age: 23
End: 2018-08-27
Payer: COMMERCIAL

## 2018-08-27 VITALS
BODY MASS INDEX: 33.16 KG/M2 | WEIGHT: 180.2 LBS | HEIGHT: 62 IN | SYSTOLIC BLOOD PRESSURE: 124 MMHG | DIASTOLIC BLOOD PRESSURE: 74 MMHG | RESPIRATION RATE: 14 BRPM

## 2018-08-27 DIAGNOSIS — N89.8 VAGINAL DISCHARGE: Primary | ICD-10-CM

## 2018-08-27 DIAGNOSIS — N30.90 CYSTITIS: ICD-10-CM

## 2018-08-27 DIAGNOSIS — N39.0 URINARY TRACT INFECTION WITHOUT HEMATURIA, SITE UNSPECIFIED: ICD-10-CM

## 2018-08-27 DIAGNOSIS — B37.3 YEAST VAGINITIS: ICD-10-CM

## 2018-08-27 DIAGNOSIS — B96.89 BV (BACTERIAL VAGINOSIS): Primary | ICD-10-CM

## 2018-08-27 DIAGNOSIS — N76.0 BACTERIAL VAGINOSIS: ICD-10-CM

## 2018-08-27 DIAGNOSIS — B96.89 BACTERIAL VAGINOSIS: ICD-10-CM

## 2018-08-27 DIAGNOSIS — N76.0 BV (BACTERIAL VAGINOSIS): Primary | ICD-10-CM

## 2018-08-27 PROBLEM — Z98.891 S/P CESAREAN SECTION: Status: RESOLVED | Noted: 2017-12-01 | Resolved: 2018-08-27

## 2018-08-27 LAB
BACTERIA UR QL AUTO: ABNORMAL /HPF
BILIRUB UR QL STRIP: NEGATIVE
CLARITY UR: CLEAR
COLOR UR: YELLOW
GLUCOSE UR STRIP-MCNC: NEGATIVE MG/DL
HGB UR QL STRIP.AUTO: NEGATIVE
HYALINE CASTS #/AREA URNS LPF: ABNORMAL /LPF
KETONES UR STRIP-MCNC: NEGATIVE MG/DL
LEUKOCYTE ESTERASE UR QL STRIP: NEGATIVE
NITRITE UR QL STRIP: NEGATIVE
NON-SQ EPI CELLS URNS QL MICRO: ABNORMAL /HPF
PH UR STRIP.AUTO: 6 [PH] (ref 4.5–8)
PROT UR STRIP-MCNC: NEGATIVE MG/DL
RBC #/AREA URNS AUTO: ABNORMAL /HPF
SP GR UR STRIP.AUTO: 1.02 (ref 1–1.03)
UROBILINOGEN UR QL STRIP.AUTO: 0.2 E.U./DL
WBC #/AREA URNS AUTO: ABNORMAL /HPF

## 2018-08-27 PROCEDURE — 81001 URINALYSIS AUTO W/SCOPE: CPT | Performed by: PHYSICIAN ASSISTANT

## 2018-08-27 PROCEDURE — 87510 GARDNER VAG DNA DIR PROBE: CPT | Performed by: PHYSICIAN ASSISTANT

## 2018-08-27 PROCEDURE — 87086 URINE CULTURE/COLONY COUNT: CPT | Performed by: PHYSICIAN ASSISTANT

## 2018-08-27 PROCEDURE — 87480 CANDIDA DNA DIR PROBE: CPT | Performed by: PHYSICIAN ASSISTANT

## 2018-08-27 PROCEDURE — 87660 TRICHOMONAS VAGIN DIR PROBE: CPT | Performed by: PHYSICIAN ASSISTANT

## 2018-08-27 PROCEDURE — 99214 OFFICE O/P EST MOD 30 MIN: CPT | Performed by: PHYSICIAN ASSISTANT

## 2018-08-27 RX ORDER — NITROFURANTOIN 25; 75 MG/1; MG/1
100 CAPSULE ORAL 2 TIMES DAILY
Qty: 14 CAPSULE | Refills: 0 | Status: SHIPPED | OUTPATIENT
Start: 2018-08-27 | End: 2018-12-03 | Stop reason: ALTCHOICE

## 2018-08-27 RX ORDER — FLUCONAZOLE 150 MG/1
TABLET ORAL
Qty: 2 TABLET | Refills: 0 | Status: SHIPPED | OUTPATIENT
Start: 2018-08-27 | End: 2018-08-31

## 2018-08-27 RX ORDER — TINIDAZOLE 500 MG/1
TABLET ORAL
Qty: 10 TABLET | Refills: 0 | Status: SHIPPED | OUTPATIENT
Start: 2018-08-27 | End: 2018-08-31

## 2018-08-27 NOTE — TELEPHONE ENCOUNTER
Pt having same symptoms as her visit in June with Shelly Hernandez (UTI/Bel[place]ian),  pls call pt & advise if a rx can be called in to her pharm,  thanks

## 2018-08-27 NOTE — PROGRESS NOTES
Chris Iverson  1995    S:  24 y o  female here for a problem visit  She reports recurrent BV, yeast, and UTIs over the past 2 years  She currently reports vaginal discharge, burning, and fishy odor  She also reports urinary frequency, dysuria, and lower abdominal pressure  She has the same sexual partner for the past year so this does not appear to be partner-related  She uses [employer] but it is scented - I asked her to switch to [employer]  I asked her to have her partner also switch to the same soap  She does not use other soaps, lotions, body washes, or wet wipes  She did recently use a vinegar and water douche because the odor was so terrible she couldn't stand it  She had started using RepHresh ProB but was inconsistently taking it so she doesn't know if it would have helped or not  They use condoms for every sexual act and this is their only form of contraception  She does note dryness with intercourse; we discussed the need to use a lubricant for intercourse, since dry sex can lead to micro-tears in the vagina which can lead to infections       Past Medical History:   Diagnosis Date    Anemia complicating pregnancy 14/9/9028    Anxiety     Asthma     Cerebral concussion 3/17/2017    Group B Streptococcus carrier, antepartum 12/1/2017    Head injury     Herpes     Migraine     Postpartum depression     [facility] seizure ([facility] [address] [facility]     Varicella     vaccine     Family History   Problem Relation Age of Onset    Asthma Mother     Depression Mother     Migraines Mother     Diabetes type II Mother     Other Mother         vertigo    Diabetes Maternal Aunt     Hypertension Maternal Aunt     Cancer Maternal Grandmother     Diabetes Maternal Grandmother     Hypertension Maternal Grandmother     Migraines Father     Heart murmur Brother      Social History     Social History    Marital status: Single     Spouse name: N/A    Number of children: N/A    Years of education: N/A     Social History Main Topics    Smoking status: Former Smoker     Quit date: 2014    Smokeless tobacco: Never Used      Comment: Never a smoker per [employer]     Alcohol use No    Drug use: No    Sexual activity: Not Currently     Partners: Male     Birth control/ protection: Condom     Other Topics Concern    Not on file     Social History Narrative    Hx of recent travel        O:  /74 (BP Location: Left arm, Patient Position: Sitting, Cuff Size: Standard)   Resp 14   Ht 5' 2" (1 575 m)   Wt 81 7 kg (180 lb 3 2 oz)   LMP 08/20/2018 (Exact Date)   BMI 32 96 kg/m²      She appears well and is in no distress  Abdomen is soft and nontender  External genitals are normal without lesions or rashes  Vagina has scant thin white discharge  Cervix is normal, no lesions or discharge    Wet mount reveals many yeast, no clue cells, no trich, pH 5 5, pos whiff    A/P:  BV - Tindamax 500mg two po daily x 5 days  Call if no better in one week  Await affirm  Add RepHresh ProB  Yeast vaginitis - Diflucan 150mg po, repeat in 4 days  Cystitis - Macrobid 100mg po BID x 5 days  Await urine culture  Will consider boric acid if persistent

## 2018-08-28 ENCOUNTER — TELEPHONE (OUTPATIENT)
Dept: OBGYN CLINIC | Facility: CLINIC | Age: 23
End: 2018-08-28

## 2018-08-28 LAB
BACTERIA UR CULT: NORMAL
CANDIDA RRNA VAG QL PROBE: POSITIVE
G VAGINALIS RRNA GENITAL QL PROBE: POSITIVE
T VAGINALIS RRNA GENITAL QL PROBE: NEGATIVE

## 2018-08-28 RX ORDER — METRONIDAZOLE 500 MG/1
TABLET ORAL
Qty: 14 TABLET | Refills: 0 | Status: SHIPPED | OUTPATIENT
Start: 2018-08-28 | End: 2018-09-04

## 2018-08-28 NOTE — TELEPHONE ENCOUNTER
----- Message from Justice Banegas PA-C sent at 8/28/2018  1:09 PM EDT -----  Please let Emilee Didi know that her urine does not show an infection   If she has continued symptoms that she feels are consistent with a UTI, I would have her see Urology for evaluation

## 2018-09-19 ENCOUNTER — OFFICE VISIT (OUTPATIENT)
Dept: FAMILY MEDICINE CLINIC | Facility: CLINIC | Age: 23
End: 2018-09-19
Payer: COMMERCIAL

## 2018-09-19 VITALS
SYSTOLIC BLOOD PRESSURE: 116 MMHG | BODY MASS INDEX: 32.07 KG/M2 | DIASTOLIC BLOOD PRESSURE: 70 MMHG | TEMPERATURE: 99.7 F | OXYGEN SATURATION: 98 % | RESPIRATION RATE: 20 BRPM | HEART RATE: 80 BPM | WEIGHT: 181 LBS | HEIGHT: 63 IN

## 2018-09-19 DIAGNOSIS — F33.42 RECURRENT MAJOR DEPRESSIVE DISORDER, IN FULL REMISSION (HCC): ICD-10-CM

## 2018-09-19 DIAGNOSIS — L70.9 ACNE, UNSPECIFIED ACNE TYPE: ICD-10-CM

## 2018-09-19 DIAGNOSIS — K64.4 EXTERNAL HEMORRHOIDS: ICD-10-CM

## 2018-09-19 DIAGNOSIS — B35.3 TINEA PEDIS OF RIGHT FOOT: ICD-10-CM

## 2018-09-19 DIAGNOSIS — F41.9 ANXIETY: Primary | ICD-10-CM

## 2018-09-19 PROBLEM — S09.90XA HEAD INJURY: Status: RESOLVED | Noted: 2017-03-17 | Resolved: 2018-09-19

## 2018-09-19 PROBLEM — S06.0X9A CEREBRAL CONCUSSION: Status: RESOLVED | Noted: 2017-03-17 | Resolved: 2018-09-19
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
## 2018-09-19 PROBLEM — S16.1XXA CERVICAL STRAIN: Status: RESOLVED | Noted: 2017-03-17 | Resolved: 2018-09-19

## 2018-09-19 PROBLEM — B37.9 CANDIDIASIS: Status: RESOLVED | Noted: 2017-11-28 | Resolved: 2018-09-19

## 2018-09-19 PROBLEM — S06.0XAA CEREBRAL CONCUSSION: Status: RESOLVED | Noted: 2017-03-17 | Resolved: 2018-09-19

## 2018-09-19 PROCEDURE — 1036F TOBACCO NON-USER: CPT | Performed by: NURSE PRACTITIONER

## 2018-09-19 PROCEDURE — 3008F BODY MASS INDEX DOCD: CPT | Performed by: NURSE PRACTITIONER

## 2018-09-19 PROCEDURE — 99204 OFFICE O/P NEW MOD 45 MIN: CPT | Performed by: NURSE PRACTITIONER

## 2018-09-19 RX ORDER — ADAPALENE 0.1 G/100G
CREAM TOPICAL
Qty: 45 G | Refills: 0 | Status: SHIPPED | OUTPATIENT
Start: 2018-09-19 | End: 2018-10-11

## 2018-09-19 NOTE — ASSESSMENT & PLAN NOTE
Counseled on the importance of healthy food choices, eating small frequent meals, avoidance of processed foods, and beginning daily physical activity      Provided referral to nutritionist

## 2018-09-19 NOTE — ASSESSMENT & PLAN NOTE
Counseled on the importance of daily physical activity and limiting caffeine  Will provide referral to counselor for additional support

## 2018-09-19 NOTE — ASSESSMENT & PLAN NOTE
Discussed the importance of a high-fiber diet, staying well hydrated, in the avoidance of straining while having a bowel movement  Provided referral to the colorectal surgeon as requested

## 2018-09-19 NOTE — ASSESSMENT & PLAN NOTE
To begin Differin gel nightly  Counseled on the importance of applying daily facial moisturizer  Also discussed the need to give medication a full 6 weeks to  work as symptoms may become worse before they improve  Provided referral to the dermatologist as requested

## 2018-09-19 NOTE — PROGRESS NOTES
Assessment/Plan:    Tinea pedis of right foot  Provided referral to podiatrist    To keep feet clean and dry  Acne  To begin Differin gel nightly  Counseled on the importance of applying daily facial moisturizer  Also discussed the need to give medication a full 6 weeks to  work as symptoms may become worse before they improve  Provided referral to the dermatologist as requested  BMI 32 0-32 9,adult  Counseled on the importance of healthy food choices, eating small frequent meals, avoidance of processed foods, and beginning daily physical activity  Provided referral to nutritionist     Depression   Currently in remission  Anxiety  Counseled on the importance of daily physical activity and limiting caffeine  Will provide referral to counselor for additional support  External hemorrhoids  Discussed the importance of a high-fiber diet, staying well hydrated, in the avoidance of straining while having a bowel movement  Provided referral to the colorectal surgeon as requested  Diagnoses and all orders for this visit:    Anxiety    Recurrent major depressive disorder, in full remission ([facility] )    BMI 32 0-32 9,adult  -     Ambulatory referral to Nutrition Services; Future    Tinea pedis of right foot  -     Ambulatory referral to Podiatry; Future    Acne, unspecified acne type  -     Ambulatory referral to Dermatology; Future  -     adapalene (DIFFERIN) 0 1 % cream; Apply topically daily at bedtime    External hemorrhoids  -     Ambulatory referral to Colorectal Surgery; Future  -     hydrocortisone (ANUSOL-HC, PROCTOSOL HC,) 2 5 % rectal cream; Apply to hemorrhoids every after bowel movement up to twice daily          Subjective:      Patient ID: Daniela Tuttle is a 24 y o  female  Hedda Factor presents for an initial visit she has a past medical history of migraines, acne, reoccurring bacterial vaginosis, anxiety, depression, and external hemorrhoids   She has a past surgical history of a   In regards to her depression, she feels it is well controlled  However, her anxiety seems to worsen in the winter months  She tries to cope by dealing with her emotions and pushing through but has difficulty  She is not connected with a counselor at this time  She is also requesting multiple referrals  She would like to have her hemorrhoids evaluated  She has had them for the last several years but they have steadily worsened  She will have pain and also bleeding after having a bowel movement  Prior to relocating to [place] while living in [place], she was connected to a podiatrist due to recurring tinea pedis  She would like to become a connected  She is also having difficulty losing weight  Requesting referral to a nutritionist     She does have a history of mild acne on her face and buttocks  She has been on several topical creams including benzoyl peroxide and clindamycin gel, none of which improved symptoms  Requesting a referral back to the dermatologist     She is up-to-date on her Pap test   Declined flu shot  The following portions of the patient's history were reviewed and updated as appropriate: She  has a past medical history of Anemia complicating pregnancy (8140); Anxiety; Asthma; Cerebral concussion (3/17/2017); Group B Streptococcus carrier, antepartum (2017); Head injury; Herpes; Migraine; Postpartum depression; and Varicella  She   Patient Active Problem List    Diagnosis Date Noted    BMI 32 0-32 9,adult 2018    Tinea pedis of right foot 2018    Acne 2018    External hemorrhoids 2018    Depression 10/02/2017    Bacterial vaginosis 2017    Anxiety 2017    Migrainous headache without aura 2017     She  has a past surgical history that includes Induced  () and pr  delivery only (N/A, 2017)    Her family history includes Asthma in her mother; Breast cancer in her maternal grandmother; Depression in her mother; Diabetes in her maternal aunt and maternal grandmother; Diabetes type II in her mother; Heart murmur in her brother; Hypertension in her maternal aunt and maternal grandmother; Lung cancer in her paternal grandmother; Migraines in her father and mother; Other in her mother; Stroke in her paternal grandmother  She  reports that she quit smoking about 4 years ago  She has never used smokeless tobacco  She reports that she does not drink alcohol or use drugs  Current Outpatient Prescriptions   Medication Sig Dispense Refill    acetaminophen (TYLENOL) 500 mg tablet Take 3 tablets by mouth daily as needed      metroNIDAZOLE (METROGEL) 0 75 % vaginal gel 1 APPLICATORFUL INTRAVAGINALLY 2X PER WEEK AT BEDTIME FOR 6 MONTHS  70 g 1    nitrofurantoin (MACROBID) 100 mg capsule Take 1 capsule (100 mg total) by mouth 2 (two) times a day 14 capsule 0    Probiotic Product (PROBIOTIC-10) CAPS Take by mouth      adapalene (DIFFERIN) 0 1 % cream Apply topically daily at bedtime 45 g 0    hydrocortisone (ANUSOL-HC, PROCTOSOL HC,) 2 5 % rectal cream Apply to hemorrhoids every after bowel movement up to twice daily 30 g 1     No current facility-administered medications for this visit  She has No Known Allergies       Review of Systems   Constitutional: Positive for unexpected weight change  HENT: Negative  Eyes: Negative  Respiratory: Negative  Cardiovascular: Negative  Gastrointestinal: Positive for rectal pain  Endocrine: Negative  Genitourinary: Negative  Musculoskeletal: Negative  Skin:        Acne   Allergic/Immunologic: Negative  Neurological: Negative  Psychiatric/Behavioral: The patient is nervous/anxious            /70   Pulse 80   Temp 99 7 °F (37 6 °C)   Resp 20   Ht 5' 3" (1 6 m)   Wt 82 1 kg (181 lb)   LMP 08/20/2018 (Exact Date)   SpO2 98%   BMI 32 06 kg/m²     Objective:     Physical Exam   Constitutional: She is oriented to person, place, and time  She appears well-developed and well-nourished  No distress  HENT:   Head: Normocephalic and atraumatic  Right Ear: External ear normal    Left Ear: External ear normal    Nose: Nose normal    Mouth/Throat: Oropharynx is clear and moist    Eyes: Conjunctivae and EOM are normal  Pupils are equal, round, and reactive to light  Neck: Normal range of motion  Neck supple  Cardiovascular: Normal rate, regular rhythm and normal heart sounds  No murmur heard  Pulmonary/Chest: Effort normal and breath sounds normal  No respiratory distress  She has no wheezes  She has no rales  She exhibits no tenderness  Abdominal: Soft  Bowel sounds are normal  She exhibits no distension and no mass  There is no tenderness  There is no rebound and no guarding  Genitourinary: Rectal exam shows external hemorrhoid  Musculoskeletal: Normal range of motion  She exhibits no edema, tenderness or deformity  Lymphadenopathy:     She has no cervical adenopathy  Neurological: She is alert and oriented to person, place, and time  No cranial nerve deficit  Skin: Skin is warm and dry  No rash noted  No erythema  No pallor  Mild acne on face   Presence of dryness on heel of right foot and maceration in between toes  Psychiatric: She has a normal mood and affect  Her behavior is normal  Judgment and thought content normal    Nursing note and vitals reviewed

## 2018-10-11 ENCOUNTER — OFFICE VISIT (OUTPATIENT)
Dept: FAMILY MEDICINE CLINIC | Facility: CLINIC | Age: 23
End: 2018-10-11
Payer: COMMERCIAL

## 2018-10-11 ENCOUNTER — APPOINTMENT (OUTPATIENT)
Dept: LAB | Facility: CLINIC | Age: 23
End: 2018-10-11
Payer: COMMERCIAL

## 2018-10-11 VITALS
HEART RATE: 70 BPM | OXYGEN SATURATION: 98 % | BODY MASS INDEX: 32.21 KG/M2 | DIASTOLIC BLOOD PRESSURE: 70 MMHG | TEMPERATURE: 99.8 F | SYSTOLIC BLOOD PRESSURE: 100 MMHG | RESPIRATION RATE: 12 BRPM | WEIGHT: 181.8 LBS | HEIGHT: 63 IN

## 2018-10-11 DIAGNOSIS — Z11.1 SCREENING FOR TUBERCULOSIS: ICD-10-CM

## 2018-10-11 DIAGNOSIS — Z02.1 PRE-EMPLOYMENT EXAMINATION: Primary | ICD-10-CM

## 2018-10-11 DIAGNOSIS — R35.0 FREQUENT URINATION: ICD-10-CM

## 2018-10-11 LAB
BACTERIA UR QL AUTO: ABNORMAL /HPF
BILIRUB UR QL STRIP: NEGATIVE
CLARITY UR: ABNORMAL
COLOR UR: YELLOW
GLUCOSE UR STRIP-MCNC: NEGATIVE MG/DL
HGB UR QL STRIP.AUTO: NEGATIVE
HYALINE CASTS #/AREA URNS LPF: ABNORMAL /LPF
KETONES UR STRIP-MCNC: NEGATIVE MG/DL
LEUKOCYTE ESTERASE UR QL STRIP: ABNORMAL
NITRITE UR QL STRIP: NEGATIVE
NON-SQ EPI CELLS URNS QL MICRO: ABNORMAL /HPF
PH UR STRIP.AUTO: 6 [PH] (ref 4.5–8)
PROT UR STRIP-MCNC: NEGATIVE MG/DL
RBC #/AREA URNS AUTO: ABNORMAL /HPF
SP GR UR STRIP.AUTO: 1.01 (ref 1–1.03)
UROBILINOGEN UR QL STRIP.AUTO: 0.2 E.U./DL
WBC #/AREA URNS AUTO: ABNORMAL /HPF

## 2018-10-11 PROCEDURE — 99395 PREV VISIT EST AGE 18-39: CPT | Performed by: NURSE PRACTITIONER

## 2018-10-11 PROCEDURE — 86480 TB TEST CELL IMMUN MEASURE: CPT

## 2018-10-11 PROCEDURE — 36415 COLL VENOUS BLD VENIPUNCTURE: CPT

## 2018-10-11 PROCEDURE — 81001 URINALYSIS AUTO W/SCOPE: CPT

## 2018-10-11 PROCEDURE — 87086 URINE CULTURE/COLONY COUNT: CPT

## 2018-10-11 RX ORDER — BENZOYL PEROXIDE 50 MG/ML
LIQUID TOPICAL
Refills: 0 | COMMUNITY
Start: 2018-10-01 | End: 2018-12-31

## 2018-10-11 RX ORDER — KETOCONAZOLE 20 MG/G
1 CREAM TOPICAL 2 TIMES DAILY
Refills: 0 | COMMUNITY
Start: 2018-10-01 | End: 2018-12-03 | Stop reason: ALTCHOICE

## 2018-10-11 RX ORDER — CLINDAMYCIN PHOSPHATE 10 UG/ML
LOTION TOPICAL
Refills: 0 | COMMUNITY
Start: 2018-10-01 | End: 2018-12-31

## 2018-10-11 RX ORDER — TRETINOIN 1 MG/G
CREAM TOPICAL
Refills: 0 | COMMUNITY
Start: 2018-10-01 | End: 2018-12-31

## 2018-10-11 NOTE — PROGRESS NOTES
Assessment/Plan:    Screening for tuberculosis    To obtain QuantiFERON gold, will call with results  Pre-employment examination  Cleared to begin employment  Diagnoses and all orders for this visit:    Pre-employment examination    Screening for tuberculosis  -     Quantiferon TB Gold Plus; Future    Other orders  -     BENZOYL PEROXIDE 5 % external wash;   -     clindamycin (CLEOCIN T) 1 % lotion;   -     ketoconazole (NIZORAL) 2 % cream; Apply 1 application topically 2 (two) times a day To affected area  -     tretinoin (RETIN-A) 0 1 % cream;           Subjective:      Patient ID: Aminah Kaye is a 24 y o  female  Costa Hussein presents for pre appointment physical   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  She is also requesting to be tested for TB via QuantiFERON gold as this is required by her employer  Denies weight loss, cough, night sweats, or recent exposure to TB  The following portions of the patient's history were reviewed and updated as appropriate: She   Patient Active Problem List    Diagnosis Date Noted    Pre-employment examination 10/11/2018    Screening for tuberculosis 10/11/2018    BMI 32 0-32 9,adult 09/19/2018    Tinea pedis of right foot 09/19/2018    Acne 09/19/2018    External hemorrhoids 09/19/2018    Depression 10/02/2017    Bacterial vaginosis 08/09/2017    Anxiety 06/28/2017    Migrainous headache without aura 03/17/2017     Current Outpatient Prescriptions   Medication Sig Dispense Refill    BENZOYL PEROXIDE 5 % external wash   0    clindamycin (CLEOCIN T) 1 % lotion   0    ketoconazole (NIZORAL) 2 % cream Apply 1 application topically 2 (two) times a day To affected area  0    metroNIDAZOLE (METROGEL) 0 75 % vaginal gel 1 APPLICATORFUL INTRAVAGINALLY 2X PER WEEK AT BEDTIME FOR 6 MONTHS   70 g 1    nitrofurantoin (MACROBID) 100 mg capsule Take 1 capsule (100 mg total) by mouth 2 (two) times a day 14 capsule 0    Probiotic Product (PROBIOTIC-10) CAPS Take by mouth      tretinoin (RETIN-A) 0 1 % cream   0     No current facility-administered medications for this visit  She has No Known Allergies       Review of Systems   Constitutional: Negative  HENT: Negative  Eyes: Negative  Respiratory: Negative  Cardiovascular: Positive for palpitations (with anxiety)  Gastrointestinal: Negative  Endocrine: Negative  Genitourinary: Negative  Musculoskeletal: Negative  Skin: Negative  Allergic/Immunologic: Negative  Neurological: Negative  Hematological: Negative  Psychiatric/Behavioral: Negative  /70   Pulse 70   Temp 99 8 °F (37 7 °C)   Resp 12   Ht 5' 3" (1 6 m)   Wt 82 5 kg (181 lb 12 8 oz)   SpO2 98%   BMI 32 20 kg/m²     Objective:     Physical Exam   Constitutional: She is oriented to person, place, and time  She appears well-developed and well-nourished  No distress  HENT:   Head: Normocephalic and atraumatic  Right Ear: External ear normal    Left Ear: External ear normal    Nose: Nose normal    Mouth/Throat: Oropharynx is clear and moist    Eyes: Pupils are equal, round, and reactive to light  Conjunctivae and EOM are normal    Neck: Normal range of motion  Neck supple  Cardiovascular: Normal rate, regular rhythm and normal heart sounds  No murmur heard  Pulmonary/Chest: Effort normal and breath sounds normal  No respiratory distress  She has no wheezes  She has no rales  She exhibits no tenderness  Abdominal: Soft  Bowel sounds are normal  She exhibits no distension and no mass  There is no tenderness  There is no rebound and no guarding  Musculoskeletal: Normal range of motion  She exhibits no edema, tenderness or deformity  Lymphadenopathy:     She has no cervical adenopathy  Neurological: She is alert and oriented to person, place, and time  No cranial nerve deficit  Skin: Skin is warm and dry  No rash noted  No erythema  No pallor     Psychiatric: She has a normal mood and affect  Her behavior is normal  Judgment and thought content normal    Nursing note and vitals reviewed

## 2018-10-12 LAB — BACTERIA UR CULT: NORMAL

## 2018-10-13 LAB
GAMMA INTERFERON BACKGROUND BLD IA-ACNC: 0.04 IU/ML
M TB IFN-G BLD-IMP: NEGATIVE
M TB IFN-G CD4+ BCKGRND COR BLD-ACNC: 0.02 IU/ML
M TB IFN-G CD4+ BCKGRND COR BLD-ACNC: 0.02 IU/ML
MITOGEN IGNF BCKGRD COR BLD-ACNC: >10 IU/ML

## 2018-10-25 ENCOUNTER — TELEPHONE (OUTPATIENT)
Dept: OBGYN CLINIC | Facility: CLINIC | Age: 23
End: 2018-10-25

## 2018-10-25 DIAGNOSIS — N89.8 VAGINAL ODOR: Primary | ICD-10-CM

## 2018-10-25 DIAGNOSIS — N89.8 VAGINAL DISCHARGE: ICD-10-CM

## 2018-10-25 RX ORDER — METRONIDAZOLE 500 MG/1
TABLET ORAL
Qty: 14 TABLET | Refills: 0 | Status: SHIPPED | OUTPATIENT
Start: 2018-10-25 | End: 2018-11-01

## 2018-10-25 RX ORDER — FLUCONAZOLE 150 MG/1
TABLET ORAL
Qty: 2 TABLET | Refills: 0 | Status: SHIPPED | OUTPATIENT
Start: 2018-10-25 | End: 2018-10-28

## 2018-10-25 NOTE — TELEPHONE ENCOUNTER
Spoke with pt - She was last seen 08/27 for vaginal discharge - was prescribed Diflucan, Macrobid and Tindamax  She only picked up the Diflucan  The Macrobid and Tindamax was too expensive at the time  Symptoms started a week ago  She has thick white cottage cheese discharge, fishy odor, itching and burning when she urinates  She feels raw and irritated  No OTC product used - states they don't work for her  Patient does have an appointment 11/05/18 but wants to know if she can  the Tindamax and use that in the time being before her appointment  Please advise  Thanks!

## 2018-10-25 NOTE — TELEPHONE ENCOUNTER
----- Message from Adrian Santos sent at 10/25/2018 11:13 AM EDT -----  Regarding: RE: results  Contact: [phone]  Carlos Miller,    You prescribed me some medicine the last time i came to visit i believe it started with a T but i never got it from the pharmacy because of the price  Is it okay to pick it up now? Because I'm getting the same symptoms bacteria and yeast again i do have a appointment with you on the 5th of November but i need some relief in the mean time     ----- Message -----  From: Siena Coreas PA-C  Sent: 8/29/18, 11:51 AM  To: Melinda Iverson  Subject: results    Carlos Fuller,    Your vaginal cultures show both yeast and bacteria, as we suspected at your visit  Hopefully your medications clear this up - if not, please let me know  Have a great day!     kip

## 2018-10-25 NOTE — TELEPHONE ENCOUNTER
We can give her Flagyl 500mg po BID x 7 days - it should be less expensive than the Tindamax was  Can repeat Diflucan 150mg po x one dose, repeat in 3 days

## 2018-10-25 NOTE — TELEPHONE ENCOUNTER
Called pt back - explained to her that we will prescribe her flagyl - gave directions and cautions of taking medication  She will also receive a refill of the Diflucan  Rx in Epic-  Please sign  Thanks!

## 2018-12-03 ENCOUNTER — OFFICE VISIT (OUTPATIENT)
Dept: OBGYN CLINIC | Facility: MEDICAL CENTER | Age: 23
End: 2018-12-03
Payer: COMMERCIAL

## 2018-12-03 VITALS — DIASTOLIC BLOOD PRESSURE: 64 MMHG | SYSTOLIC BLOOD PRESSURE: 102 MMHG | WEIGHT: 176 LBS | BODY MASS INDEX: 31.18 KG/M2

## 2018-12-03 DIAGNOSIS — B37.3 VAGINAL YEAST INFECTION: ICD-10-CM

## 2018-12-03 DIAGNOSIS — N94.9 VAGINAL BURNING: ICD-10-CM

## 2018-12-03 DIAGNOSIS — N76.0 BACTERIAL VAGINOSIS: ICD-10-CM

## 2018-12-03 DIAGNOSIS — B96.89 BACTERIAL VAGINOSIS: ICD-10-CM

## 2018-12-03 DIAGNOSIS — Z11.3 SCREEN FOR STD (SEXUALLY TRANSMITTED DISEASE): ICD-10-CM

## 2018-12-03 PROCEDURE — 87491 CHLMYD TRACH DNA AMP PROBE: CPT | Performed by: PHYSICIAN ASSISTANT

## 2018-12-03 PROCEDURE — 87186 SC STD MICRODIL/AGAR DIL: CPT | Performed by: PHYSICIAN ASSISTANT

## 2018-12-03 PROCEDURE — 87591 N.GONORRHOEAE DNA AMP PROB: CPT | Performed by: PHYSICIAN ASSISTANT

## 2018-12-03 PROCEDURE — 87077 CULTURE AEROBIC IDENTIFY: CPT | Performed by: PHYSICIAN ASSISTANT

## 2018-12-03 PROCEDURE — 99213 OFFICE O/P EST LOW 20 MIN: CPT | Performed by: PHYSICIAN ASSISTANT

## 2018-12-03 PROCEDURE — 87086 URINE CULTURE/COLONY COUNT: CPT | Performed by: PHYSICIAN ASSISTANT

## 2018-12-03 RX ORDER — FLUCONAZOLE 150 MG/1
TABLET ORAL
Qty: 2 TABLET | Refills: 0 | Status: SHIPPED | OUTPATIENT
Start: 2018-12-03 | End: 2018-12-08

## 2018-12-03 RX ORDER — TINIDAZOLE 500 MG/1
TABLET ORAL
Qty: 10 TABLET | Refills: 0 | Status: SHIPPED | OUTPATIENT
Start: 2018-12-03 | End: 2018-12-08

## 2018-12-03 RX ORDER — CLINDAMYCIN PHOSPHATE 20 MG/G
1 CREAM VAGINAL
Qty: 40 G | Refills: 0 | Status: SHIPPED | OUTPATIENT
Start: 2018-12-03 | End: 2018-12-31

## 2018-12-03 NOTE — PROGRESS NOTES
Alexia Iverson  1995    S:  24 y o  female here for a problem visit  She reports continued issues with vaginal discharge and foul odor  She did not fill Tindamax back in August because it was not covered; she used Flagyl but didn't finish the course because it caused GI distress  She then called the office in October and got Metrogel Vaginal which she used but feels it did nothing  She continues to complain of foul vaginal odor  She notes burning with urination - not end-urination dysuria but more of an "internal" burning with urination, and does not think it is from urine touching her skin  She denies frequency, urgency or nocturia  She is sexually active with the same partner and they use condoms consistently  She elects STD cultures  She notes dryness with sex and is frustrated with needing to use lubricants  She says he does a good job with foreplay so she doesn't understand why she doesn't get lubricated; we discussed her current infection as interfering with lubrication and we discussed adding a probiotic like Rephresh ProB daily to help prevent infections       Past Medical History:   Diagnosis Date    Anemia complicating pregnancy 33/1/1919    Anxiety     Asthma     Cerebral concussion 3/17/2017    Group B Streptococcus carrier, antepartum 12/1/2017    Head injury     Herpes     Migraine     Postpartum depression     Varicella     vaccine     Family History   Problem Relation Age of Onset    Asthma Mother     Depression Mother    Exca [place] Migraines Mother     Diabetes type II Mother     Other Mother         vertigo    Diabetes Maternal Aunt     Hypertension Maternal Aunt     Diabetes Maternal Grandmother     Hypertension Maternal Grandmother     Breast cancer Maternal Grandmother     Migraines Father     Heart murmur Brother     Lung cancer Paternal Grandmother     Stroke Paternal Grandmother      Social History     Social History    Marital status: Single     Spouse name: N/A    Number of children: N/A    Years of education: N/A     Social History Main Topics    Smoking status: Former Smoker     Quit date: 2014    Smokeless tobacco: Never Used      Comment: Never a smoker per [employer]     Alcohol use No    Drug use: No    Sexual activity: Not Currently     Partners: Male     Birth control/ protection: Condom     Other Topics Concern    None     Social History Narrative    Hx of recent travel        O:  /64 (BP Location: Right arm, Patient Position: Sitting, Cuff Size: Standard)   Wt 79 8 kg (176 lb)   LMP 11/08/2018   BMI 31 18 kg/m²   She appears well and is in no distress  Abdomen is soft and nontender  External genitals are normal without lesions or rashes  Vagina has copious thin white malodorous discharge  Cervix is normal, no lesions or discharge  Uterus is nontender, no masses  Adnexa are nontender, no pelvic masses appreciated    Wet mount reveals many clue cells, no trich, no yeast, pH 5 5, pos whiff     A/P:  BV   Sent Clindamycin cream QHS x 7 as well as Tindamax 500mg two po daily x 5 days - she will fill whichever is less expensive  Add Rephresh ProB probiotics  STD screening  GC/chlamydia sent today  Dysuria  Check urine culture  If neg, refer to urology

## 2018-12-04 DIAGNOSIS — R31.9 URINARY TRACT INFECTION WITH HEMATURIA, SITE UNSPECIFIED: Primary | ICD-10-CM

## 2018-12-04 DIAGNOSIS — N39.0 URINARY TRACT INFECTION WITH HEMATURIA, SITE UNSPECIFIED: Primary | ICD-10-CM

## 2018-12-04 RX ORDER — SULFAMETHOXAZOLE AND TRIMETHOPRIM 800; 160 MG/1; MG/1
1 TABLET ORAL EVERY 12 HOURS SCHEDULED
Qty: 6 TABLET | Refills: 0 | Status: SHIPPED | OUTPATIENT
Start: 2018-12-04 | End: 2018-12-07

## 2018-12-04 NOTE — TELEPHONE ENCOUNTER
Spoke with pt - she is aware of urine results and that she is being prescribed Bactrim DS  Gave directions on use  Rx in Epic - please sign  Thanks!

## 2018-12-04 NOTE — TELEPHONE ENCOUNTER
----- Message from Maggie Osman PA-C sent at 12/4/2018  1:19 PM EST -----  Urine shows a UTI - please send bactrim DS one po BID x 3 days   Thx

## 2018-12-05 LAB
BACTERIA UR CULT: ABNORMAL
C TRACH DNA SPEC QL NAA+PROBE: NEGATIVE
N GONORRHOEA DNA SPEC QL NAA+PROBE: NEGATIVE

## 2018-12-07 DIAGNOSIS — N39.0 URINARY TRACT INFECTION WITHOUT HEMATURIA, SITE UNSPECIFIED: Primary | ICD-10-CM

## 2018-12-07 RX ORDER — NITROFURANTOIN 25; 75 MG/1; MG/1
100 CAPSULE ORAL 2 TIMES DAILY
Qty: 10 CAPSULE | Refills: 0 | Status: SHIPPED | OUTPATIENT
Start: 2018-12-07 | End: 2018-12-12

## 2018-12-21 DIAGNOSIS — N76.0 BV (BACTERIAL VAGINOSIS): ICD-10-CM

## 2018-12-21 DIAGNOSIS — B96.89 BV (BACTERIAL VAGINOSIS): ICD-10-CM

## 2018-12-21 DIAGNOSIS — B37.9 CANDIDIASIS: Primary | ICD-10-CM

## 2018-12-21 RX ORDER — METRONIDAZOLE 500 MG/1
500 TABLET ORAL 2 TIMES DAILY
Qty: 14 TABLET | Refills: 0 | Status: SHIPPED | OUTPATIENT
Start: 2018-12-21 | End: 2018-12-28

## 2018-12-21 RX ORDER — FLUCONAZOLE 150 MG/1
TABLET ORAL
Qty: 1 TABLET | Refills: 0 | Status: SHIPPED | OUTPATIENT
Start: 2018-12-21 | End: 2018-12-21

## 2018-12-21 NOTE — TELEPHONE ENCOUNTER
I just saw her 3 weeks ago - she was supposed to use either Clinda cream or Tindamax - which one did she use? What are her current symptoms?

## 2018-12-21 NOTE — TELEPHONE ENCOUNTER
rx to pharm for flagyl and diflucan   instructions given to pt and informed if no improvement, she would need apt

## 2018-12-21 NOTE — TELEPHONE ENCOUNTER
Pt called stating she has taken tindamax for bv   cipro for uti which was changed to macrobid which she did not     She will get today    Still has raging bv    Cleocin and tindamax not covered    She has [facility]    Also now has a yeast infection  Pharm [facility]    She had to pay for the tinidamax, 18-31 00  req rx for yeast and bv

## 2018-12-27 ENCOUNTER — TELEPHONE (OUTPATIENT)
Dept: OBGYN CLINIC | Facility: CLINIC | Age: 23
End: 2018-12-27

## 2018-12-27 DIAGNOSIS — N30.00 ACUTE CYSTITIS WITHOUT HEMATURIA: Primary | ICD-10-CM

## 2018-12-27 NOTE — TELEPHONE ENCOUNTER
I'm afraid I must ask you to call her  She told me I was laughing at her and this was no "f word" to me  Pt initially took bactrim ds - she got sick on it but we didn't know that  We ordered macrobid but she didn't get it and take it till the 21st  She still has urinary sx - frequency and burning  Pt said her bv is "raging"  She used tindamax (we thought she didn't use it as it was not covered) and then also complained of yeast    She was on macrobid, diflucan and flagyl - all at the same time  Took them incorrectly - once a day - for 3 days   Is vomiting and still saysshe has odor, dschand urinary sx

## 2018-12-27 NOTE — TELEPHONE ENCOUNTER
lmtcb  Per [facility] - ov  I am holding spot for tomorrow, in [facility] at 11:40 with [facility]    Pt to have u/a first  Slip entered for urine and culture

## 2018-12-27 NOTE — TELEPHONE ENCOUNTER
Patient was prescribed medicine for BV and a UTI and patient is sick from medicine to the point of almost going to hospital  Patient doesn't know if she needs another appt

## 2018-12-28 NOTE — TELEPHONE ENCOUNTER
Patient was supposed to see Promise Pino today at 11:40am for reoccurring BV but her flight was delayed and she will not be home from [place] in time  Patient was offered an appointment with Promise Pino on 1/4/2019 but said that was too long and wanted to know if there was a possibility to be seen sooner

## 2018-12-28 NOTE — TELEPHONE ENCOUNTER
Please advise  Patient is not able to make her appointment that was scheduled for her for today due to her flight being delayed  Was offered one for next Friday, but wants to be seen sooner   Scheduled her to see Jen Wray at 12pm

## 2018-12-31 ENCOUNTER — OFFICE VISIT (OUTPATIENT)
Dept: OBGYN CLINIC | Facility: CLINIC | Age: 23
End: 2018-12-31
Payer: COMMERCIAL

## 2018-12-31 VITALS
BODY MASS INDEX: 32.24 KG/M2 | HEIGHT: 62 IN | DIASTOLIC BLOOD PRESSURE: 72 MMHG | WEIGHT: 175.2 LBS | SYSTOLIC BLOOD PRESSURE: 120 MMHG

## 2018-12-31 DIAGNOSIS — R30.0 DYSURIA: ICD-10-CM

## 2018-12-31 DIAGNOSIS — N76.0 RECURRENT VAGINITIS: Primary | ICD-10-CM

## 2018-12-31 PROCEDURE — 87510 GARDNER VAG DNA DIR PROBE: CPT | Performed by: NURSE PRACTITIONER

## 2018-12-31 PROCEDURE — 99214 OFFICE O/P EST MOD 30 MIN: CPT | Performed by: NURSE PRACTITIONER

## 2018-12-31 PROCEDURE — 87660 TRICHOMONAS VAGIN DIR PROBE: CPT | Performed by: NURSE PRACTITIONER

## 2018-12-31 PROCEDURE — 81001 URINALYSIS AUTO W/SCOPE: CPT | Performed by: NURSE PRACTITIONER

## 2018-12-31 PROCEDURE — 87480 CANDIDA DNA DIR PROBE: CPT | Performed by: NURSE PRACTITIONER

## 2018-12-31 PROCEDURE — 87086 URINE CULTURE/COLONY COUNT: CPT | Performed by: NURSE PRACTITIONER

## 2018-12-31 RX ORDER — FLUCONAZOLE 150 MG/1
TABLET ORAL
Qty: 2 TABLET | Refills: 0 | Status: SHIPPED | OUTPATIENT
Start: 2018-12-31 | End: 2019-01-03

## 2018-12-31 RX ORDER — CIPROFLOXACIN 250 MG/1
500 TABLET, FILM COATED ORAL EVERY 12 HOURS SCHEDULED
Qty: 28 TABLET | Refills: 0 | Status: SHIPPED | OUTPATIENT
Start: 2018-12-31 | End: 2019-01-07

## 2018-12-31 RX ORDER — CLINDAMYCIN HYDROCHLORIDE 300 MG/1
300 CAPSULE ORAL 2 TIMES DAILY
Qty: 14 CAPSULE | Refills: 0 | Status: SHIPPED | OUTPATIENT
Start: 2018-12-31 | End: 2019-01-07

## 2018-12-31 NOTE — ASSESSMENT & PLAN NOTE
Difficult exam due to presence of menstrual blood  Affirm collected; she declines retesting for gc/chl  The patient feels strongly that she has both BV and candida - rx provided for Clinda and diflucan  Reviewed conservative vulvar hygiene and advised pelvic rest until sx have resolved   She agrees with plan

## 2018-12-31 NOTE — PROGRESS NOTES
Assessment/Plan:    Dysuria  UA/C&S sent  Advised pushing fluids and starting Cipro while tests are pending  Reviewed pyelo and reasons to call  F/u if sx do not improve    Recurrent vaginitis  Difficult exam due to presence of menstrual blood  Affirm collected; she declines retesting for gc/chl  The patient feels strongly that she has both BV and candida - rx provided for Clinda and diflucan  Reviewed conservative vulvar hygiene and advised pelvic rest until sx have resolved  She agrees with plan     25 mins of time was devoted to this visit, of which >50% was spent counseling face to face  Diagnoses and all orders for this visit:    Recurrent vaginitis  -     VAGINOSIS DNA PROBE (AFFIRM)  -     clindamycin (CLEOCIN) 300 MG capsule; Take 1 capsule (300 mg total) by mouth 2 (two) times a day for 7 days  -     Cancel: VAGINOSIS DNA PROBE (AFFIRM)  -     fluconazole (DIFLUCAN) 150 mg tablet; One tablet by mouth on day one and one tablet by mouth on day four    Dysuria  -     ciprofloxacin (CIPRO) 250 mg tablet; Take 2 tablets (500 mg total) by mouth every 12 (twelve) hours for 7 days  -     Urinalysis with microscopic  -     Urine culture    Other orders  -     Cancel: Chlamydia/GC amplified DNA by PCR          Subjective:      Patient ID: Tari Holley is a 24 y o  female  This patient presents with recurrent UTI and vaginitis   Sx have been present intermittently for over 1 month  Dysuria present  Denies hematuria, fever or flank pain  Fishy smelling discharge and vaginal burning present  She feels strongly that she has BV, yeast and UTI  She has been treated with several rounds of abx   Had compliance issues with Flagyl and macrobid due to poor tolerance of the meds - GI upset - she reports she has taken both meds separately and had same reaction   Uses condoms, dove sensitive  Takes probiotics daily  Doesn't work out, no wet bathing suits     She expresses frustration about recurrence - affecting qol Waldemar Harrington has menses           The following portions of the patient's history were reviewed and updated as appropriate: allergies, current medications, past family history, past medical history, past social history, past surgical history and problem list     Review of Systems   Constitutional: Negative  HENT: Negative  Eyes: Negative  Respiratory: Negative  Cardiovascular: Negative  Gastrointestinal: Negative  Endocrine: Negative  Genitourinary: Positive for dysuria and vaginal discharge  Negative for decreased urine volume, difficulty urinating, flank pain, hematuria, menstrual problem, pelvic pain and urgency  Vaginal burning    Musculoskeletal: Negative  Skin: Negative  Allergic/Immunologic: Negative  Neurological: Negative  Hematological: Negative  Psychiatric/Behavioral: Negative  Objective:      /72 (BP Location: Right arm, Cuff Size: Standard)   Ht 5' 2" (1 575 m)   Wt 79 5 kg (175 lb 3 2 oz)   LMP 12/30/2018   BMI 32 04 kg/m²          Physical Exam   Constitutional: She is oriented to person, place, and time  She appears well-developed and well-nourished  HENT:   Head: Normocephalic and atraumatic  Eyes: Pupils are equal, round, and reactive to light  Neck: Normal range of motion  Pulmonary/Chest: Effort normal    Abdominal: Hernia confirmed negative in the right inguinal area and confirmed negative in the left inguinal area  Genitourinary: Rectum normal and uterus normal        There is no rash, tenderness, lesion or injury on the right labia  There is no rash, tenderness, lesion or injury on the left labia  Cervix exhibits no motion tenderness, no discharge and no friability  Right adnexum displays no mass, no tenderness and no fullness  Left adnexum displays no mass, no tenderness and no fullness  There is erythema and bleeding in the vagina  No tenderness in the vagina  No vaginal discharge found     Musculoskeletal: Normal range of motion  Lymphadenopathy:        Right: No inguinal adenopathy present  Left: No inguinal adenopathy present  Neurological: She is alert and oriented to person, place, and time  Skin: Skin is warm and dry  Psychiatric: She has a normal mood and affect   Her behavior is normal  Judgment and thought content normal

## 2018-12-31 NOTE — ASSESSMENT & PLAN NOTE
UA/C&S sent  Advised pushing fluids and starting Cipro while tests are pending  Reviewed pyelo and reasons to call   F/u if sx do not improve

## 2019-01-01 LAB
BACTERIA UR QL AUTO: ABNORMAL /HPF
BILIRUB UR QL STRIP: NEGATIVE
CLARITY UR: ABNORMAL
COLOR UR: YELLOW
GLUCOSE UR STRIP-MCNC: NEGATIVE MG/DL
HGB UR QL STRIP.AUTO: ABNORMAL
KETONES UR STRIP-MCNC: NEGATIVE MG/DL
LEUKOCYTE ESTERASE UR QL STRIP: NEGATIVE
NITRITE UR QL STRIP: NEGATIVE
NON-SQ EPI CELLS URNS QL MICRO: ABNORMAL /HPF
PH UR STRIP.AUTO: 6 [PH] (ref 4.5–8)
PROT UR STRIP-MCNC: NEGATIVE MG/DL
RBC #/AREA URNS AUTO: ABNORMAL /HPF
SP GR UR STRIP.AUTO: 1.01 (ref 1–1.03)
UROBILINOGEN UR QL STRIP.AUTO: 0.2 E.U./DL
WBC #/AREA URNS AUTO: ABNORMAL /HPF

## 2019-01-02 LAB
BACTERIA UR CULT: NORMAL
CANDIDA RRNA VAG QL PROBE: NEGATIVE
G VAGINALIS RRNA GENITAL QL PROBE: POSITIVE
T VAGINALIS RRNA GENITAL QL PROBE: NEGATIVE

## 2019-01-04 ENCOUNTER — TELEPHONE (OUTPATIENT)
Dept: OBGYN CLINIC | Facility: CLINIC | Age: 24
End: 2019-01-04

## 2019-01-04 NOTE — TELEPHONE ENCOUNTER
Urine studies indicate no UTI - please advise pushing fluids   Vaginitis swab with BV - the patient should complete meds as ordered   F/u PRN if not improved

## 2019-01-04 NOTE — TELEPHONE ENCOUNTER
Patient was seen on 12/31/2018 and is confused by the test results she recently got back  She would like to speak to a nurse so they can explain them further

## 2019-01-04 NOTE — TELEPHONE ENCOUNTER
Patient returned call - she is aware of urine and culture results  She is going to complete the medication given to her  Gave recommendations on how to prevent it  If any questions to call back

## 2019-01-21 ENCOUNTER — TELEPHONE (OUTPATIENT)
Dept: FAMILY MEDICINE CLINIC | Facility: CLINIC | Age: 24
End: 2019-01-21

## 2019-01-21 NOTE — TELEPHONE ENCOUNTER
I called Blue Raines to inform her as the three letters sent out stated if there was another no-show she would be discharged from the practice  She stated she understood  I gave her information to practice and told her my name and number so she could call me if she needed any help  I informed her she can use our office for the next 30 days for emergent care, and that a certified letter was being sent  She apologized for the amount of no shows, she asked that I apologized to Smitha Pressley assured her I would

## 2019-02-28 ENCOUNTER — OFFICE VISIT (OUTPATIENT)
Dept: OBGYN CLINIC | Facility: CLINIC | Age: 24
End: 2019-02-28
Payer: COMMERCIAL

## 2019-02-28 VITALS — WEIGHT: 179 LBS | DIASTOLIC BLOOD PRESSURE: 72 MMHG | SYSTOLIC BLOOD PRESSURE: 108 MMHG | BODY MASS INDEX: 32.74 KG/M2

## 2019-02-28 DIAGNOSIS — B96.89 BV (BACTERIAL VAGINOSIS): Primary | ICD-10-CM

## 2019-02-28 DIAGNOSIS — N76.0 BV (BACTERIAL VAGINOSIS): Primary | ICD-10-CM

## 2019-02-28 PROCEDURE — 99213 OFFICE O/P EST LOW 20 MIN: CPT | Performed by: PHYSICIAN ASSISTANT

## 2019-02-28 RX ORDER — CLINDAMYCIN PHOSPHATE 20 MG/G
CREAM VAGINAL
Qty: 40 G | Refills: 3 | Status: SHIPPED | OUTPATIENT
Start: 2019-02-28 | End: 2019-05-21 | Stop reason: ALTCHOICE

## 2019-02-28 NOTE — PROGRESS NOTES
Isamar Iverson  1995    S:  24 y o  female here for a problem visit  She reports recurrent BV  She complains of clear vaginal discharge with fishy odor  She denies itching or burning  The most recent time she had intercourse she had swelling and discomfort and felt very dry  She has a history of recurrent BV  She is using [employer], as is her partner  They are now using condoms consistently  She is taking a probiotic albeit irregularly, and she plans to be more consistent with this       Past Medical History:   Diagnosis Date    Anemia complicating pregnancy 3785    Anxiety     Asthma     Cerebral concussion 3/17/2017    Group B Streptococcus carrier, antepartum 2017    Head injury     Herpes     Migraine     Postpartum depression     Varicella     vaccine     Family History   Problem Relation Age of Onset    Asthma Mother     Depression Mother     Migraines Mother     Diabetes type II Mother     Other Mother         vertigo    Diabetes Maternal Aunt     Hypertension Maternal Aunt     Diabetes Maternal Grandmother     Hypertension Maternal Grandmother     Breast cancer Maternal Grandmother     Migraines Father     Heart murmur Brother     Lung cancer Paternal Grandmother     Stroke Paternal Grandmother      Social History     Socioeconomic History    Marital status: Single     Spouse name: None    Number of children: None    Years of education: None    Highest education level: None   Occupational History    None   Social Needs    Financial resource strain: None    Food insecurity:     Worry: None     Inability: None    Transportation needs:     Medical: None     Non-medical: None   Tobacco Use    Smoking status: Former Smoker     Last attempt to quit: 2014     Years since quittin 1    Smokeless tobacco: Never Used    Tobacco comment: Never a smoker per [employer]    Substance and Sexual Activity    Alcohol use: No    Drug use: No    Sexual activity: Not Currently     Partners: Male     Birth control/protection: Condom   Lifestyle    Physical activity:     Days per week: None     Minutes per session: None    Stress: None   Relationships    Social connections:     Talks on phone: None     Gets together: None     Attends Faith service: None     Active member of club or organization: None     Attends meetings of clubs or organizations: None     Relationship status: None    Intimate partner violence:     Fear of current or ex partner: None     Emotionally abused: None     Physically abused: None     Forced sexual activity: None   Other Topics Concern    None   Social History Narrative    Hx of recent travel        O:  /72 (BP Location: Right arm, Patient Position: Sitting, Cuff Size: Standard)   Wt 81 2 kg (179 lb)   LMP 02/23/2019 (Exact Date)   BMI 32 74 kg/m²   She appears well and is in no distress  Abdomen is soft and nontender  External genitals are normal without lesions or rashes  Vagina has copious thin white malodorous discharge  Cervix is normal, no lesions or discharge    Wet mount reveals no yeast or trich, many clue cells,pH 5 5, pos whiff    A/P:  BV  Cleocin cream QHS x 7 nights then weekly x 6 months  She is aware that this weakens condoms  She is not interested in further contraception  She will call with any further problems

## 2019-03-01 ENCOUNTER — TELEPHONE (OUTPATIENT)
Dept: OBGYN CLINIC | Facility: CLINIC | Age: 24
End: 2019-03-01

## 2019-03-01 NOTE — TELEPHONE ENCOUNTER
No - clindamycin is an antibiotic  Clotrimazole is an antifungal (yeast med)  She has recurrent BV  I think we got it covered last time somehow - can you please look into that?

## 2019-04-23 ENCOUNTER — TELEPHONE (OUTPATIENT)
Dept: OBGYN CLINIC | Facility: CLINIC | Age: 24
End: 2019-04-23

## 2019-05-17 ENCOUNTER — TRANSCRIBE ORDERS (OUTPATIENT)
Dept: LAB | Facility: CLINIC | Age: 24
End: 2019-05-17

## 2019-05-17 ENCOUNTER — APPOINTMENT (OUTPATIENT)
Dept: LAB | Facility: CLINIC | Age: 24
End: 2019-05-17
Payer: COMMERCIAL

## 2019-05-17 DIAGNOSIS — R30.9 URINATION PAIN: Primary | ICD-10-CM

## 2019-05-17 DIAGNOSIS — N30.00 ACUTE CYSTITIS WITHOUT HEMATURIA: ICD-10-CM

## 2019-05-17 DIAGNOSIS — R30.9 URINATION PAIN: ICD-10-CM

## 2019-05-17 PROCEDURE — 87086 URINE CULTURE/COLONY COUNT: CPT

## 2019-05-17 PROCEDURE — 87147 CULTURE TYPE IMMUNOLOGIC: CPT

## 2019-05-17 PROCEDURE — 81001 URINALYSIS AUTO W/SCOPE: CPT | Performed by: PHYSICIAN ASSISTANT

## 2019-05-17 RX ORDER — CIPROFLOXACIN 250 MG/1
TABLET, FILM COATED ORAL
Qty: 6 TABLET | Refills: 0 | Status: SHIPPED | OUTPATIENT
Start: 2019-05-17 | End: 2019-05-21 | Stop reason: ALTCHOICE

## 2019-05-18 LAB
BACTERIA UR CULT: ABNORMAL
BACTERIA UR CULT: ABNORMAL
BACTERIA UR QL AUTO: ABNORMAL /HPF
BILIRUB UR QL STRIP: NEGATIVE
CLARITY UR: ABNORMAL
COLOR UR: YELLOW
GLUCOSE UR STRIP-MCNC: NEGATIVE MG/DL
HGB UR QL STRIP.AUTO: ABNORMAL
KETONES UR STRIP-MCNC: NEGATIVE MG/DL
LEUKOCYTE ESTERASE UR QL STRIP: ABNORMAL
NITRITE UR QL STRIP: NEGATIVE
NON-SQ EPI CELLS URNS QL MICRO: ABNORMAL /HPF
PH UR STRIP.AUTO: 7.5 [PH]
PROT UR STRIP-MCNC: NEGATIVE MG/DL
RBC #/AREA URNS AUTO: ABNORMAL /HPF
SP GR UR STRIP.AUTO: 1.01 (ref 1–1.03)
UROBILINOGEN UR QL STRIP.AUTO: 0.2 E.U./DL
WBC #/AREA URNS AUTO: ABNORMAL /HPF

## 2019-05-21 ENCOUNTER — OFFICE VISIT (OUTPATIENT)
Dept: OBGYN CLINIC | Facility: CLINIC | Age: 24
End: 2019-05-21
Payer: COMMERCIAL

## 2019-05-21 VITALS — WEIGHT: 162 LBS | SYSTOLIC BLOOD PRESSURE: 110 MMHG | DIASTOLIC BLOOD PRESSURE: 62 MMHG | BODY MASS INDEX: 29.63 KG/M2

## 2019-05-21 DIAGNOSIS — B96.89 BACTERIAL VAGINOSIS: ICD-10-CM

## 2019-05-21 DIAGNOSIS — B37.3 YEAST VAGINITIS: ICD-10-CM

## 2019-05-21 DIAGNOSIS — Z11.3 SCREEN FOR STD (SEXUALLY TRANSMITTED DISEASE): Primary | ICD-10-CM

## 2019-05-21 DIAGNOSIS — N76.0 BACTERIAL VAGINOSIS: ICD-10-CM

## 2019-05-21 PROCEDURE — 87591 N.GONORRHOEAE DNA AMP PROB: CPT | Performed by: PHYSICIAN ASSISTANT

## 2019-05-21 PROCEDURE — 87491 CHLMYD TRACH DNA AMP PROBE: CPT | Performed by: PHYSICIAN ASSISTANT

## 2019-05-21 PROCEDURE — 99213 OFFICE O/P EST LOW 20 MIN: CPT | Performed by: PHYSICIAN ASSISTANT

## 2019-05-21 RX ORDER — CLINDAMYCIN PHOSPHATE 20 MG/G
CREAM VAGINAL
Qty: 40 G | Refills: 0 | Status: SHIPPED | OUTPATIENT
Start: 2019-05-21 | End: 2019-10-22 | Stop reason: ALTCHOICE

## 2019-05-22 LAB
C TRACH DNA SPEC QL NAA+PROBE: NEGATIVE
N GONORRHOEA DNA SPEC QL NAA+PROBE: NEGATIVE

## 2019-06-04 ENCOUNTER — APPOINTMENT (EMERGENCY)
Dept: RADIOLOGY | Facility: HOSPITAL | Age: 24
End: 2019-06-04
Payer: COMMERCIAL

## 2019-06-04 ENCOUNTER — HOSPITAL ENCOUNTER (EMERGENCY)
Facility: HOSPITAL | Age: 24
Discharge: HOME | End: 2019-06-04
Attending: EMERGENCY MEDICINE | Admitting: EMERGENCY MEDICINE
Payer: COMMERCIAL

## 2019-06-04 VITALS
HEIGHT: 62 IN | OXYGEN SATURATION: 98 % | RESPIRATION RATE: 16 BRPM | SYSTOLIC BLOOD PRESSURE: 119 MMHG | BODY MASS INDEX: 31 KG/M2 | DIASTOLIC BLOOD PRESSURE: 70 MMHG | HEART RATE: 94 BPM | WEIGHT: 168.43 LBS | TEMPERATURE: 98.8 F

## 2019-06-04 DIAGNOSIS — J06.9 UPPER RESPIRATORY INFECTION: Primary | ICD-10-CM

## 2019-06-04 LAB — EXT PREG TEST URINE: NEGATIVE

## 2019-06-04 PROCEDURE — 71046 X-RAY EXAM CHEST 2 VIEWS: CPT

## 2019-06-04 PROCEDURE — 99283 EMERGENCY DEPT VISIT LOW MDM: CPT

## 2019-06-04 PROCEDURE — 81025 URINE PREGNANCY TEST: CPT | Performed by: EMERGENCY MEDICINE

## 2019-06-04 PROCEDURE — 99283 EMERGENCY DEPT VISIT LOW MDM: CPT | Performed by: EMERGENCY MEDICINE

## 2019-06-04 PROCEDURE — 93005 ELECTROCARDIOGRAM TRACING: CPT

## 2019-06-04 RX ORDER — SACCHAROMYCES BOULARDII 250 MG
250 CAPSULE ORAL 2 TIMES DAILY
COMMUNITY
End: 2019-10-22 | Stop reason: ALTCHOICE

## 2019-06-05 LAB
ATRIAL RATE: 86 BPM
P AXIS: 64 DEGREES
PR INTERVAL: 166 MS
QRS AXIS: 63 DEGREES
QRSD INTERVAL: 98 MS
QT INTERVAL: 328 MS
QTC INTERVAL: 392 MS
T WAVE AXIS: 59 DEGREES
VENTRICULAR RATE: 86 BPM

## 2019-06-05 PROCEDURE — 93010 ELECTROCARDIOGRAM REPORT: CPT | Performed by: INTERNAL MEDICINE

## 2019-07-15 ENCOUNTER — TELEPHONE (OUTPATIENT)
Dept: OBGYN CLINIC | Facility: CLINIC | Age: 24
End: 2019-07-15

## 2019-07-15 DIAGNOSIS — N76.0 BV (BACTERIAL VAGINOSIS): Primary | ICD-10-CM

## 2019-07-15 DIAGNOSIS — B96.89 BV (BACTERIAL VAGINOSIS): Primary | ICD-10-CM

## 2019-07-15 NOTE — TELEPHONE ENCOUNTER
Pt had appt scheduled for today with Benita Romero but can not get out of work  She would like something called in  Same symptoms as when she say Benita Romero last time, itching, discharge

## 2019-07-15 NOTE — TELEPHONE ENCOUNTER
Pt stopped using her clindamycin daily at end of May  She then got her period  She then used clindamycin once a week for 2 weeks   We got a prior auth for it - but she did not resume using it  Used Terazol 3 about 5/21 also - pt said it did not work  I could not discern if she used terazol and clindamycin at same time  ?? She has approval (from ins) for more clindamycin but not sure if she should start it daily for a week  Pt said sx as bad as ever  Does have appt with Dr Karen Benjamin beginning of Aug    A bit of non compliance  ?? Pt said she was to have appt today but cannot get off from work  Aware we will not get back to her till tomorrow    Thanks

## 2019-07-16 RX ORDER — METRONIDAZOLE 500 MG/1
500 TABLET ORAL EVERY 12 HOURS SCHEDULED
Qty: 14 TABLET | Refills: 0 | Status: SHIPPED | OUTPATIENT
Start: 2019-07-16 | End: 2019-07-23

## 2019-07-16 NOTE — TELEPHONE ENCOUNTER
Spoke to Ana Arambula per [facility] that pt should contact Dr Ella Gowers office to see what they recommend as they will be doing an exam on her next  She understands and states that she has their contact info

## 2019-07-16 NOTE — TELEPHONE ENCOUNTER
Has she talked to Dr Guevara Expose office to see when she needs to stop all meds prior to her visit?

## 2019-07-16 NOTE — TELEPHONE ENCOUNTER
I would ask Dr Rosendo Rojas when he wants her to stop her meds - these meds could interfere with his exam

## 2019-07-16 NOTE — TELEPHONE ENCOUNTER
Per prev notes,   Pt Not seeing doc Satya Hanna until 8/14,  So its ok for her to be treated by us  pls call pt as she needs to explain more,  [phone],  thanks

## 2019-07-16 NOTE — TELEPHONE ENCOUNTER
Spoke with pt    2 recent rx's for cleosin, last middle of June    Began with itching, grey discharge, and odor 7/3  Has apt with dr Giovanni Calles, but earliest apt was 08/14  She is very uncomfortable and is req current treatment and further instructions     These sympt  Followed her last cycle

## 2019-07-16 NOTE — TELEPHONE ENCOUNTER
Pt has to stop all meds 1 week prior    Pt is req flagyl    Pills      Does not want vaginal gel  tamica foster

## 2019-08-29 ENCOUNTER — APPOINTMENT (EMERGENCY)
Dept: RADIOLOGY | Facility: HOSPITAL | Age: 24
End: 2019-08-29
Payer: COMMERCIAL

## 2019-08-29 ENCOUNTER — HOSPITAL ENCOUNTER (EMERGENCY)
Facility: HOSPITAL | Age: 24
Discharge: HOME | End: 2019-08-29
Attending: EMERGENCY MEDICINE | Admitting: EMERGENCY MEDICINE
Payer: COMMERCIAL

## 2019-08-29 VITALS
DIASTOLIC BLOOD PRESSURE: 68 MMHG | SYSTOLIC BLOOD PRESSURE: 117 MMHG | TEMPERATURE: 98.1 F | OXYGEN SATURATION: 99 % | RESPIRATION RATE: 18 BRPM | HEART RATE: 73 BPM

## 2019-08-29 DIAGNOSIS — M54.9 BACK PAIN: Primary | ICD-10-CM

## 2019-08-29 PROCEDURE — 99283 EMERGENCY DEPT VISIT LOW MDM: CPT

## 2019-08-29 PROCEDURE — 96372 THER/PROPH/DIAG INJ SC/IM: CPT

## 2019-08-29 PROCEDURE — 72100 X-RAY EXAM L-S SPINE 2/3 VWS: CPT

## 2019-08-29 PROCEDURE — 99283 EMERGENCY DEPT VISIT LOW MDM: CPT | Performed by: EMERGENCY MEDICINE

## 2019-08-29 RX ORDER — KETOROLAC TROMETHAMINE 30 MG/ML
15 INJECTION, SOLUTION INTRAMUSCULAR; INTRAVENOUS ONCE
Status: COMPLETED | OUTPATIENT
Start: 2019-08-29 | End: 2019-08-29

## 2019-08-29 RX ORDER — IBUPROFEN 800 MG/1
800 TABLET ORAL 3 TIMES DAILY
Qty: 21 TABLET | Refills: 0 | Status: SHIPPED | OUTPATIENT
Start: 2019-08-29 | End: 2019-10-22 | Stop reason: ALTCHOICE

## 2019-08-29 RX ORDER — LIDOCAINE 50 MG/G
1 PATCH TOPICAL ONCE
Status: DISCONTINUED | OUTPATIENT
Start: 2019-08-29 | End: 2019-08-29 | Stop reason: HOSPADM

## 2019-08-29 RX ADMIN — LIDOCAINE 1 PATCH: 50 PATCH TOPICAL at 00:56

## 2019-08-29 RX ADMIN — KETOROLAC TROMETHAMINE 15 MG: 30 INJECTION, SOLUTION INTRAMUSCULAR at 01:34

## 2019-08-29 NOTE — ED PROVIDER NOTES
History  Chief Complaint   Patient presents with    Back Pain     pt states she pulled something in her back at work last week     21year old female presenting to the emergency department for eval of mid back pain, pt describes right sided mid/low back pain, aching in nature, no radiation,  began after "pnulling it" at work about a week ago, somewhat better, but has not fully resolved, no numbness, weakness, describes, some "tingling" in saddle area however  Prior to Admission Medications   Prescriptions Last Dose Informant Patient Reported? Taking? clindamycin (CLEOCIN) 2 % vaginal cream   No No   Sig: One applicator nightly x 7 then one applicator weekly x 6 months   saccharomyces boulardii (FLORASTOR) 250 mg capsule   Yes No   Sig: Take 250 mg by mouth 2 (two) times a day   terconazole (TERAZOL 3) 0 8 % vaginal cream   No No   Sig: Insert 1 applicator into the vagina daily at bedtime   Patient not taking: Reported on 2019      Facility-Administered Medications: None       Past Medical History:   Diagnosis Date    Anemia complicating pregnancy     Anxiety     Asthma     Cerebral concussion 3/17/2017    Group B Streptococcus carrier, antepartum 2017    Head injury     Herpes     Migraine     Postpartum depression     Varicella     vaccine       Past Surgical History:   Procedure Laterality Date    APPENDECTOMY      INDUCED       [place]  DELIVERY ONLY N/A 2017    Procedure:  SECTION ();   Surgeon: Emil Espinal DO;  Location: [facility];  Service: Obstetrics       Family History   Problem Relation Age of Onset    Asthma Mother     Depression Mother    [facility] Migraines Mother     Diabetes type II Mother     Other Mother         vertigo    Diabetes Maternal Aunt     Hypertension Maternal Aunt     Diabetes Maternal Grandmother     Hypertension Maternal Grandmother     Breast cancer Maternal Grandmother     Migraines Father     Heart murmur Brother     Lung cancer Paternal Grandmother     Stroke Paternal Grandmother      I have reviewed and agree with the history as documented  Social History     Tobacco Use    Smoking status: Former Smoker     Last attempt to quit: 2014     Years since quittin 6    Smokeless tobacco: Never Used    Tobacco comment: Never a smoker per [employer]    Substance Use Topics    Alcohol use: No    Drug use: No        Review of Systems   Constitutional: Negative for appetite change, chills, fatigue and fever  HENT: Negative for sneezing and sore throat  Eyes: Negative for visual disturbance  Respiratory: Negative for cough, choking, chest tightness, shortness of breath and wheezing  Cardiovascular: Negative for chest pain and palpitations  Gastrointestinal: Negative for abdominal pain, constipation, diarrhea, nausea and vomiting  Genitourinary: Negative for difficulty urinating and dysuria  Musculoskeletal: Positive for back pain  Neurological: Negative for dizziness, weakness, light-headedness, numbness and headaches  All other systems reviewed and are negative  Physical Exam  Physical Exam   Constitutional: She is oriented to person, place, and time  She appears well-developed and well-nourished  No distress  HENT:   Head: Normocephalic and atraumatic  Mouth/Throat: Oropharynx is clear and moist    Eyes: Pupils are equal, round, and reactive to light  EOM are normal    Neck: No JVD present  No tracheal deviation present  Cardiovascular: Normal rate, regular rhythm, normal heart sounds and intact distal pulses  Exam reveals no gallop and no friction rub  No murmur heard  Pulmonary/Chest: Effort normal and breath sounds normal  No respiratory distress  She has no wheezes  She has no rales  Abdominal: Soft  Bowel sounds are normal  She exhibits no distension  There is no tenderness  There is no rebound and no guarding     Musculoskeletal:        Lumbar back: She exhibits tenderness  Neurological: She is alert and oriented to person, place, and time  No cranial nerve deficit  She exhibits normal muscle tone  Rectal tone is good, sensation over S1-S5 dermatomes is normal  5/5 B/L LE strength    Skin: Skin is warm and dry  She is not diaphoretic  No pallor  Psychiatric: She has a normal mood and affect  Her behavior is normal    Nursing note and vitals reviewed  Vital Signs  ED Triage Vitals   Temperature Pulse Respirations Blood Pressure SpO2   08/29/19 0042 08/29/19 0041 08/29/19 0041 08/29/19 0041 08/29/19 0041   98 1 °F (36 7 °C) 73 18 117/68 99 %      Temp Source Heart Rate Source Patient Position - Orthostatic VS BP Location FiO2 (%)   08/29/19 0042 -- 08/29/19 0041 08/29/19 0041 --   Oral  Lying Right arm       Pain Score       --                  Vitals:    08/29/19 0041   BP: 117/68   Pulse: 73   Patient Position - Orthostatic VS: Lying         Visual Acuity      ED Medications  Medications   ketorolac (TORADOL) injection 15 mg (15 mg Intramuscular Given 8/29/19 0134)       Diagnostic Studies  Results Reviewed     None                 XR spine lumbar 2 or 3 views injury   Final Result by Hyacinth Clements MD (08/29 0815)      Unremarkable exam         Workstation performed: [id number]                    Procedures  Procedures       ED Course                               MDM  Number of Diagnoses or Management Options  Back pain:   Diagnosis management comments: 21year old female with low back pain  concerning hx of possible saddle anesthesia but normal neuro exam here  Will check XR L spine, give nsaids, d/c with return precautions         Disposition  Final diagnoses:   Back pain     Time reflects when diagnosis was documented in both MDM as applicable and the Disposition within this note     Time User Action Codes Description Comment    8/29/2019  1:46 AM Shikha Bland Add [M54 9] Back pain       ED Disposition     ED Disposition Condition Date/Time Comment    Discharge Stable Thu Aug 29, 2019  1:46 AM Sana Iverson discharge to home/self care  Follow-up Information    None         Discharge Medication List as of 8/29/2019  1:50 AM      START taking these medications    Details   !! ibuprofen (MOTRIN) 800 mg tablet Take 1 tablet (800 mg total) by mouth 3 (three) times a day, Starting u 8/29/2019, Normal      !! ibuprofen (MOTRIN) 800 mg tablet Take 1 tablet (800 mg total) by mouth 3 (three) times a day, Starting Thu 8/29/2019, Print       !! - Potential duplicate medications found  Please discuss with provider  CONTINUE these medications which have NOT CHANGED    Details   clindamycin (CLEOCIN) 2 % vaginal cream One applicator nightly x 7 then one applicator weekly x 6 months, Normal      saccharomyces boulardii (FLORASTOR) 250 mg capsule Take 250 mg by mouth 2 (two) times a day, Historical Med      terconazole (TERAZOL 3) 0 8 % vaginal cream Insert 1 applicator into the vagina daily at bedtime, Starting Tue 5/21/2019, Normal           No discharge procedures on file      ED Provider  Electronically Signed by           William Douglas MD  09/05/19 2007

## 2019-10-14 ENCOUNTER — APPOINTMENT (EMERGENCY)
Dept: RADIOLOGY | Facility: HOSPITAL | Age: 24
End: 2019-10-14
Payer: COMMERCIAL

## 2019-10-14 ENCOUNTER — HOSPITAL ENCOUNTER (EMERGENCY)
Facility: HOSPITAL | Age: 24
Discharge: HOME | End: 2019-10-14
Attending: EMERGENCY MEDICINE
Payer: COMMERCIAL

## 2019-10-14 VITALS
WEIGHT: 162.26 LBS | TEMPERATURE: 98.5 F | DIASTOLIC BLOOD PRESSURE: 55 MMHG | SYSTOLIC BLOOD PRESSURE: 105 MMHG | RESPIRATION RATE: 19 BRPM | HEART RATE: 77 BPM | BODY MASS INDEX: 29.68 KG/M2 | OXYGEN SATURATION: 99 %

## 2019-10-14 DIAGNOSIS — J06.9 VIRAL URI: ICD-10-CM

## 2019-10-14 DIAGNOSIS — R07.89 ATYPICAL CHEST PAIN: ICD-10-CM

## 2019-10-14 DIAGNOSIS — J20.9 ACUTE BRONCHITIS: Primary | ICD-10-CM

## 2019-10-14 LAB
ALBUMIN SERPL BCP-MCNC: 3.4 G/DL (ref 3.5–5)
ALP SERPL-CCNC: 57 U/L (ref 46–116)
ALT SERPL W P-5'-P-CCNC: 11 U/L (ref 12–78)
ANION GAP SERPL CALCULATED.3IONS-SCNC: 9 MMOL/L (ref 4–13)
AST SERPL W P-5'-P-CCNC: 12 U/L (ref 5–45)
ATRIAL RATE: 77 BPM
BASOPHILS # BLD AUTO: 0.04 THOUSANDS/ΜL (ref 0–0.1)
BASOPHILS NFR BLD AUTO: 1 % (ref 0–1)
BILIRUB SERPL-MCNC: 0.3 MG/DL (ref 0.2–1)
BUN SERPL-MCNC: 9 MG/DL (ref 5–25)
CALCIUM SERPL-MCNC: 9.1 MG/DL (ref 8.3–10.1)
CHLORIDE SERPL-SCNC: 105 MMOL/L (ref 100–108)
CO2 SERPL-SCNC: 24 MMOL/L (ref 21–32)
CREAT SERPL-MCNC: 0.58 MG/DL (ref 0.6–1.3)
EOSINOPHIL # BLD AUTO: 0.25 THOUSAND/ΜL (ref 0–0.61)
EOSINOPHIL NFR BLD AUTO: 5 % (ref 0–6)
ERYTHROCYTE [DISTWIDTH] IN BLOOD BY AUTOMATED COUNT: 15.2 % (ref 11.6–15.1)
GFR SERPL CREATININE-BSD FRML MDRD: 148 ML/MIN/1.73SQ M
GLUCOSE SERPL-MCNC: 80 MG/DL (ref 65–140)
HCT VFR BLD AUTO: 33 % (ref 34.8–46.1)
HGB BLD-MCNC: 10.1 G/DL (ref 11.5–15.4)
IMM GRANULOCYTES # BLD AUTO: 0.01 THOUSAND/UL (ref 0–0.2)
IMM GRANULOCYTES NFR BLD AUTO: 0 % (ref 0–2)
LYMPHOCYTES # BLD AUTO: 2.21 THOUSANDS/ΜL (ref 0.6–4.47)
LYMPHOCYTES NFR BLD AUTO: 42 % (ref 14–44)
MCH RBC QN AUTO: 24.2 PG (ref 26.8–34.3)
MCHC RBC AUTO-ENTMCNC: 30.6 G/DL (ref 31.4–37.4)
MCV RBC AUTO: 79 FL (ref 82–98)
MONOCYTES # BLD AUTO: 0.47 THOUSAND/ΜL (ref 0.17–1.22)
MONOCYTES NFR BLD AUTO: 9 % (ref 4–12)
NEUTROPHILS # BLD AUTO: 2.32 THOUSANDS/ΜL (ref 1.85–7.62)
NEUTS SEG NFR BLD AUTO: 43 % (ref 43–75)
NRBC BLD AUTO-RTO: 0 /100 WBCS
P AXIS: 56 DEGREES
PLATELET # BLD AUTO: 295 THOUSANDS/UL (ref 149–390)
PMV BLD AUTO: 10.4 FL (ref 8.9–12.7)
POTASSIUM SERPL-SCNC: 4 MMOL/L (ref 3.5–5.3)
PR INTERVAL: 156 MS
PROT SERPL-MCNC: 7.8 G/DL (ref 6.4–8.2)
QRS AXIS: 105 DEGREES
QRSD INTERVAL: 98 MS
QT INTERVAL: 360 MS
QTC INTERVAL: 407 MS
RBC # BLD AUTO: 4.18 MILLION/UL (ref 3.81–5.12)
SODIUM SERPL-SCNC: 138 MMOL/L (ref 136–145)
T WAVE AXIS: 47 DEGREES
TROPONIN I SERPL-MCNC: <0.02 NG/ML
VENTRICULAR RATE: 77 BPM
WBC # BLD AUTO: 5.3 THOUSAND/UL (ref 4.31–10.16)

## 2019-10-14 PROCEDURE — 93005 ELECTROCARDIOGRAM TRACING: CPT

## 2019-10-14 PROCEDURE — 80053 COMPREHEN METABOLIC PANEL: CPT | Performed by: EMERGENCY MEDICINE

## 2019-10-14 PROCEDURE — 93010 ELECTROCARDIOGRAM REPORT: CPT | Performed by: INTERNAL MEDICINE

## 2019-10-14 PROCEDURE — 71046 X-RAY EXAM CHEST 2 VIEWS: CPT

## 2019-10-14 PROCEDURE — 94640 AIRWAY INHALATION TREATMENT: CPT

## 2019-10-14 PROCEDURE — 36415 COLL VENOUS BLD VENIPUNCTURE: CPT

## 2019-10-14 PROCEDURE — 99285 EMERGENCY DEPT VISIT HI MDM: CPT

## 2019-10-14 PROCEDURE — 84484 ASSAY OF TROPONIN QUANT: CPT | Performed by: EMERGENCY MEDICINE

## 2019-10-14 PROCEDURE — 99285 EMERGENCY DEPT VISIT HI MDM: CPT | Performed by: EMERGENCY MEDICINE

## 2019-10-14 PROCEDURE — 85025 COMPLETE CBC W/AUTO DIFF WBC: CPT | Performed by: EMERGENCY MEDICINE

## 2019-10-14 RX ORDER — ALBUTEROL SULFATE 2.5 MG/3ML
2.5 SOLUTION RESPIRATORY (INHALATION) ONCE
Status: COMPLETED | OUTPATIENT
Start: 2019-10-14 | End: 2019-10-14

## 2019-10-14 RX ORDER — ALBUTEROL SULFATE 90 UG/1
2 AEROSOL, METERED RESPIRATORY (INHALATION) EVERY 4 HOURS PRN
Qty: 1 INHALER | Refills: 0 | Status: SHIPPED | OUTPATIENT
Start: 2019-10-14 | End: 2019-10-22 | Stop reason: ALTCHOICE

## 2019-10-14 RX ADMIN — ALBUTEROL SULFATE 2.5 MG: 2.5 SOLUTION RESPIRATORY (INHALATION) at 14:12

## 2019-10-14 NOTE — DISCHARGE INSTRUCTIONS
Use the albuterol as needed  Take over-the-counter medications as needed to help of your cold symptoms  Follow up with primary care doctor to make sure that you are doing better

## 2019-10-14 NOTE — ED PROVIDER NOTES
History  Chief Complaint   Patient presents with    Chest Pain     pt stats that she has really bad chest pain , a tightness that started two days ago as well as left arm pain      HPI    Prior to Admission Medications   Prescriptions Last Dose Informant Patient Reported? Taking? clindamycin (CLEOCIN) 2 % vaginal cream   No No   Sig: One applicator nightly x 7 then one applicator weekly x 6 months   ibuprofen (MOTRIN) 800 mg tablet   No No   Sig: Take 1 tablet (800 mg total) by mouth 3 (three) times a day   ibuprofen (MOTRIN) 800 mg tablet   No No   Sig: Take 1 tablet (800 mg total) by mouth 3 (three) times a day   saccharomyces boulardii (FLORASTOR) 250 mg capsule   Yes No   Sig: Take 250 mg by mouth 2 (two) times a day   terconazole (TERAZOL 3) 0 8 % vaginal cream   No No   Sig: Insert 1 applicator into the vagina daily at bedtime   Patient not taking: Reported on 2019      Facility-Administered Medications: None       Past Medical History:   Diagnosis Date    Anemia complicating pregnancy     Anxiety     Asthma     Cerebral concussion 3/17/2017    Group B Streptococcus carrier, antepartum 2017    Head injury     Herpes     Migraine     Postpartum depression     Varicella     vaccine       Past Surgical History:   Procedure Laterality Date    APPENDECTOMY      INDUCED       [place]  DELIVERY ONLY N/A 2017    Procedure:  SECTION ();   Surgeon: Sameera Lantigua DO;  Location: [facility] ;  Service: Obstetrics       Family History   Problem Relation Age of Onset    Asthma Mother     Depression Mother    Will[facility] Rand Migraines Mother     Diabetes type II Mother     Other Mother         vertigo    Diabetes Maternal Aunt     Hypertension Maternal Aunt     Diabetes Maternal Grandmother     Hypertension Maternal Grandmother     Breast cancer Maternal Grandmother     Migraines Father     Heart murmur Brother     Lung cancer Paternal Grandmother     Stroke Paternal Grandmother      I have reviewed and agree with the history as documented  Social History     Tobacco Use    Smoking status: Former Smoker     Last attempt to quit: 2014     Years since quittin 7    Smokeless tobacco: Never Used    Tobacco comment: Never a smoker per [employer]    Substance Use Topics    Alcohol use: No    Drug use: No        Review of Systems    Physical Exam  Physical Exam   Constitutional: She is oriented to person, place, and time  She appears well-developed and well-nourished  No distress  HENT:   Head: Normocephalic and atraumatic  Nose: Mucosal edema and rhinorrhea present  Mouth/Throat: Oropharynx is clear and moist    Eyes: Pupils are equal, round, and reactive to light  Conjunctivae are normal    Neck: Normal range of motion  No tracheal deviation present  Cardiovascular: Normal rate, regular rhythm, normal heart sounds and intact distal pulses  Pulmonary/Chest: Effort normal  No respiratory distress  She has decreased breath sounds (mild, diffuse)  She has no wheezes  She has no rhonchi  She has no rales  She exhibits tenderness (mild, anterior chest)  Speaking easily in full sentences   Abdominal: Soft  She exhibits no distension  There is no tenderness  Musculoskeletal: She exhibits no edema  Neurological: She is alert and oriented to person, place, and time  She has normal strength  GCS eye subscore is 4  GCS verbal subscore is 5  GCS motor subscore is 6  Skin: Skin is warm and dry  Psychiatric: She has a normal mood and affect  Her behavior is normal    Nursing note and vitals reviewed        Vital Signs  ED Triage Vitals [10/14/19 1140]   Temperature Pulse Respirations Blood Pressure SpO2   98 5 °F (36 9 °C) 80 16 129/66 100 %      Temp Source Heart Rate Source Patient Position - Orthostatic VS BP Location FiO2 (%)   Oral Monitor Sitting Left arm --      Pain Score       --           Vitals:    10/14/19 1140   BP: 129/66   Pulse: 80   Patient Position - Orthostatic VS: Sitting         Visual Acuity      ED Medications  Medications   albuterol inhalation solution 2 5 mg (has no administration in time range)       Diagnostic Studies  Results Reviewed     Procedure Component Value Units Date/Time    Comprehensive metabolic panel [[id number]] Updated:  10/14/19 1243    Lab Status:  No result Specimen:  Blood from Arm, Left     Troponin I [[id number]] Updated:  10/14/19 1243    Lab Status:  No result Specimen:  Blood from Arm, Left     CBC and differential [[id number]]  (Abnormal) Collected:  10/14/19 1233    Lab Status:  Final result Specimen:  Blood from Arm, Left Updated:  10/14/19 1239     WBC 5 30 Thousand/uL      RBC 4 18 Million/uL      Hemoglobin 10 1 g/dL      Hematocrit 33 0 %      MCV 79 fL      MCH 24 2 pg      MCHC 30 6 g/dL      RDW 15 2 %      MPV 10 4 fL      Platelets 189 Thousands/uL      nRBC 0 /100 WBCs      Neutrophils Relative 43 %      Immat GRANS % 0 %      Lymphocytes Relative 42 %      Monocytes Relative 9 %      Eosinophils Relative 5 %      Basophils Relative 1 %      Neutrophils Absolute 2 32 Thousands/µL      Immature Grans Absolute 0 01 Thousand/uL      Lymphocytes Absolute 2 21 Thousands/µL      Monocytes Absolute 0 47 Thousand/µL      Eosinophils Absolute 0 25 Thousand/µL      Basophils Absolute 0 04 Thousands/µL                  XR chest 2 views    (Results Pending)              Procedures  ECG 12 Lead Documentation Only  Date/Time: 10/14/2019 12:58 PM  Performed by: Bianca Vogt MD  Authorized by: Bianca Vogt MD     Indications / Diagnosis:  CP  ECG reviewed by me, the ED Provider: yes    Patient location:  ED  Previous ECG:     Previous ECG:  Compared to current    Comparison ECG info:  06/04/19    Similarity:  Changes noted (new RAD)  Interpretation:     Interpretation: non-specific    Rate:     ECG rate:  77    ECG rate assessment: normal    Rhythm:     Rhythm: sinus rhythm      Rhythm comment:  Sinus arrhythmia  Ectopy:     Ectopy: none    QRS:     QRS axis:  Right    QRS intervals:  Normal  Conduction:     Conduction: abnormal      Abnormal conduction: incomplete RBBB    ST segments:     ST segments:  Normal  T waves:     T waves: flattening      Flattening:  AVL and I           ED Course                               MDM  Number of Diagnoses or Management Options  Acute bronchitis: new and requires workup  Atypical chest pain: new and requires workup  Viral URI: new and requires workup  Diagnosis management comments: This is a 76-year-old female who presents here today with chest pain  She states she began having pain in the center of her chest two days ago  She describes it as a pulling and tightness sensation  Does not radiate  She states it happens spontaneously  She denies any known triggers  She denies any relation to positions, exertion, movement, breathing, eating  She endorses URI symptoms, with nasal congestion and cough, since 10/11  She denies any fevers  She occasionally feels winded and going up a flight of stairs but did not have any pain with doing so  She had a history of asthma as a child but denies any persistent symptoms related to this  She denies any fevers  She denies any nausea, vomiting, diarrhea, palpitations  She denies any known sick contacts  The pain starts spontaneously, lasts for up to an hour before resolving  She has not taken or done anything to try and help the pain  She states nothing helps make it feel better  She denies any injuries to her chest   She denies any underlying medical problems, daily medications  She does not take blood thinning medications  Review of systems:  Otherwise negative unless stated as above    She is well-appearing, in no acute distress  She has mildly decreased breath sounds diffusely but no wheezing or other abnormal breath sounds  She does have mild chest tenderness  Exam is otherwise unremarkable  This is most likely acute bronchitis secondary to her recent URI symptoms  We will get a chest x-ray and EKG to evaluate, and treat her with albuterol  Lab work had been ordered by the nurse prior to the patient being seen by myself, and shows a mild anemia but is otherwise unremarkable  Chest x-ray was read by myself, and shows no acute abnormalities  The patient feels better after albuterol, and air movement has improved  I discussed the findings, treatment at home, follow-up, and indications for return, and she expresses understanding with this plan  Amount and/or Complexity of Data Reviewed  Clinical lab tests: ordered and reviewed  Tests in the radiology section of CPT®: ordered and reviewed  Independent visualization of images, tracings, or specimens: yes        Disposition  Final diagnoses:   None     ED Disposition     None      Follow-up Information    None         Patient's Medications   Discharge Prescriptions    No medications on file     No discharge procedures on file      ED Provider  Electronically Signed by           Aleah Rodriguez MD  10/14/19 4618

## 2019-10-22 ENCOUNTER — OFFICE VISIT (OUTPATIENT)
Dept: FAMILY MEDICINE CLINIC | Facility: CLINIC | Age: 24
End: 2019-10-22
Payer: COMMERCIAL

## 2019-10-22 VITALS
WEIGHT: 166.2 LBS | BODY MASS INDEX: 30.59 KG/M2 | OXYGEN SATURATION: 100 % | HEIGHT: 62 IN | SYSTOLIC BLOOD PRESSURE: 110 MMHG | DIASTOLIC BLOOD PRESSURE: 60 MMHG | RESPIRATION RATE: 16 BRPM | HEART RATE: 80 BPM

## 2019-10-22 DIAGNOSIS — Z13.6 SCREENING FOR CARDIOVASCULAR CONDITION: ICD-10-CM

## 2019-10-22 DIAGNOSIS — A60.04 HERPES SIMPLEX VULVOVAGINITIS: Primary | ICD-10-CM

## 2019-10-22 PROCEDURE — 99213 OFFICE O/P EST LOW 20 MIN: CPT | Performed by: FAMILY MEDICINE

## 2019-10-22 PROCEDURE — 3008F BODY MASS INDEX DOCD: CPT | Performed by: FAMILY MEDICINE

## 2019-10-22 RX ORDER — VALACYCLOVIR HYDROCHLORIDE 1 G/1
1000 TABLET, FILM COATED ORAL 2 TIMES DAILY
Qty: 14 TABLET | Refills: 0 | Status: SHIPPED | OUTPATIENT
Start: 2019-10-22 | End: 2019-12-23 | Stop reason: ALTCHOICE

## 2019-10-22 NOTE — ASSESSMENT & PLAN NOTE
Will start her on Valtrex 1 g p o  twice a day for 7 days and advised to drink more water and advised to follow up with her gynecologist for possible suppressive therapy as she is getting frequent herpes infections

## 2019-10-22 NOTE — PROGRESS NOTES
Assessment/Plan:    Problem List Items Addressed This Visit        Genitourinary    Herpes simplex vulvovaginitis - Primary     Will start her on Valtrex 1 g p o  twice a day for 7 days and advised to drink more water and advised to follow up with her gynecologist for possible suppressive therapy as she is getting frequent herpes infections         Relevant Medications    valACYclovir (VALTREX) 1,000 mg tablet       Other    Screening for cardiovascular condition     Her previous record reviewed, and will order lipid panel         Relevant Orders    Lipid panel          Chief Complaint   Patient presents with    [facility]       Subjective:   Patient ID: Quita Carter is a 21 y o  female  She is a new patient, she says she has been seen by specialist in [place] as she was getting recurrent bacterial vaginosis and yeast infections and she was sent there by her gynecologist, and now they gave her a prescription for clindamycin for 1 week and then she will have Diflucan,  She says yesterday she noted she has blisters on her vulva and vagina, and this is painful, she feels burning in urine 2, she says she has history of few times herpes on her genital and the 1st episode was when she was pregnant 2 years ago,  Denies any fever chills,  She is nonsmoker,  She has family history of hyperlipidemia, her grandmother from maternal side has hyperlipidemia      Review of Systems   Constitutional: Negative for activity change, appetite change, chills, diaphoresis, fatigue, fever and unexpected weight change  HENT: Negative for congestion, dental problem, ear discharge, ear pain, facial swelling, hearing loss, mouth sores, nosebleeds, postnasal drip, rhinorrhea, sinus pressure, sinus pain, sneezing, sore throat, trouble swallowing and voice change  Eyes: Negative for photophobia, pain, discharge, redness and itching  Respiratory: Negative for cough, chest tightness, shortness of breath and wheezing  Cardiovascular: Negative for chest pain, palpitations and leg swelling  Gastrointestinal: Negative for abdominal distention, abdominal pain, blood in stool, constipation, diarrhea and nausea  Endocrine: Negative for cold intolerance, heat intolerance, polydipsia, polyphagia and polyuria  Genitourinary: Negative for dysuria, flank pain, frequency, hematuria and urgency  Musculoskeletal: Negative for arthralgias, back pain, myalgias and neck pain  Skin: Negative for color change and pallor  Allergic/Immunologic: Negative for environmental allergies and food allergies  Neurological: Negative for dizziness, weakness, light-headedness, numbness and headaches  Hematological: Negative for adenopathy  Does not bruise/bleed easily  Psychiatric/Behavioral: Negative for behavioral problems, sleep disturbance and suicidal ideas  The patient is not nervous/anxious  Objective:  Physical Exam   Constitutional: She appears well-developed and well-nourished  HENT:   Head: Normocephalic and atraumatic  Mouth/Throat: Oropharynx is clear and moist    Eyes: Conjunctivae and EOM are normal  No scleral icterus  Neck: Normal range of motion  Neck supple  No thyromegaly present  Cardiovascular: Normal rate, regular rhythm and normal heart sounds  Pulmonary/Chest: Effort normal and breath sounds normal  She has no wheezes  She has no rales  Genitourinary:   Genitourinary Comments: She has many small lesions on the vulva and labia   Lymphadenopathy:     She has no cervical adenopathy  Neurological: She is alert  Skin: No rash noted  No erythema  Psychiatric: She has a normal mood and affect  Nursing note and vitals reviewed  Past Surgical History:   Procedure Laterality Date    APPENDECTOMY      INDUCED       [place]  DELIVERY ONLY N/A 2017    Procedure:  SECTION ();   Surgeon: Dominique Schmid DO;  Location: [facility];  Service: Obstetrics       Family History   Problem Relation Age of Onset   Andressa Bueno Asthma Mother     Depression Mother    Andressa Bueno Migraines Mother     Diabetes type II Mother     Other Mother         vertigo    Diabetes Maternal Aunt     Hypertension Maternal Aunt     Diabetes Maternal Grandmother     Hypertension Maternal Grandmother     Breast cancer Maternal Grandmother     Migraines Father     Heart murmur Brother     Lung cancer Paternal Grandmother     Stroke Paternal Grandmother          Current Outpatient Medications:     CLINDAMYCIN HCL PO, Take 1 tablet by mouth 2 (two) times a day, Disp: , Rfl:     Fluconazole (DIFLUCAN PO), Take 1 tablet by mouth One tablet twice a week for 6 months, Disp: , Rfl:     valACYclovir (VALTREX) 1,000 mg tablet, Take 1 tablet (1,000 mg total) by mouth 2 (two) times a day for 7 days, Disp: 14 tablet, Rfl: 0    Allergies   Allergen Reactions    Flagyl [Metronidazole] Nausea Only    Macrobid [Nitrofurantoin] Nausea Only       Vitals:    10/22/19 1109   BP: 110/60   Pulse: 80   Resp: 16   SpO2: 100%   Weight: 75 4 kg (166 lb 3 2 oz)   Height: 5' 2" (1 575 m)

## 2019-10-29 NOTE — PROGRESS NOTES
Selvin Iverson  1995    S:  21 y o  female here for a problem visit  She saw Dr Lorraine Carney Nurse Practitioner in August  Plan was for clindamycin PO x 2 weeks followed by Diflucan twice weekly x 6 months  The prescription for Diflucan was written wrong and she ran out about a month ago  She now has vaginal itching, discharge and odor again  Dr Lorraine Carney practice is now out of business and she has no way to contact them for follow-up  She was seen on 10/22 by her PCP and was felt to have genital HSV outbreak and was started on Valtrex 1g BID x 7 days  She would like to start valtrex daily for suppression at this point       Past Medical History:   Diagnosis Date    Anemia complicating pregnancy     Anxiety     Asthma     Cerebral concussion 3/17/2017    Group B Streptococcus carrier, antepartum 2017    Head injury     Herpes     Migraine     Postpartum depression     Varicella     vaccine     Family History   Problem Relation Age of Onset    Asthma Mother     Depression Mother     Migraines Mother     Diabetes type II Mother     Other Mother         vertigo    Diabetes Maternal Aunt     Hypertension Maternal Aunt     Diabetes Maternal Grandmother     Hypertension Maternal Grandmother     Breast cancer Maternal Grandmother     Migraines Father     Heart murmur Brother     Lung cancer Paternal Grandmother     Stroke Paternal Grandmother      Social History     Socioeconomic History    Marital status: Single     Spouse name: None    Number of children: None    Years of education: None    Highest education level: None   Occupational History    None   Social Needs    Financial resource strain: None    Food insecurity:     Worry: None     Inability: None    Transportation needs:     Medical: None     Non-medical: None   Tobacco Use    Smoking status: Former Smoker     Last attempt to quit:      Years since quittin 8    Smokeless tobacco: Never Used    Tobacco comment: Never a smoker per [employer]    Substance and Sexual Activity    Alcohol use: No    Drug use: No    Sexual activity: Not Currently     Partners: Male     Birth control/protection: Condom   Lifestyle    Physical activity:     Days per week: None     Minutes per session: None    Stress: None   Relationships    Social connections:     Talks on phone: None     Gets together: None     Attends Denominational service: None     Active member of club or organization: None     Attends meetings of clubs or organizations: None     Relationship status: None    Intimate partner violence:     Fear of current or ex partner: None     Emotionally abused: None     Physically abused: None     Forced sexual activity: None   Other Topics Concern    None   Social History Narrative    Hx of recent travel        Review of Systems   Respiratory: Negative  Cardiovascular: Negative  Gastrointestinal: Negative for constipation and diarrhea  Genitourinary: Negative for difficulty urinating, pelvic pain, vaginal bleeding, vaginal discharge, itching or odor  O:  /72 (BP Location: Right arm, Patient Position: Sitting, Cuff Size: Standard)   Wt 74 4 kg (164 lb)   LMP 10/16/2019   BMI 30 00 kg/m²   She appears well and is in no distress  Abdomen is soft and nontender  External genitals are normal without lesions or rashes  Vagina has copious thin yellow discharge   Cervix is normal, no lesions or discharge    Wet mount reveals clue cells, yeast, no trich, pH 5 5, pos whiff     A/P: Genital HSV  Valtrex 500mg po daily   Recurrent vaginitis   Clindamycin 300mg po BID x 14 days then Diflucan 100mg twice weekly x 6 months

## 2019-10-30 ENCOUNTER — OFFICE VISIT (OUTPATIENT)
Dept: OBGYN CLINIC | Facility: CLINIC | Age: 24
End: 2019-10-30
Payer: COMMERCIAL

## 2019-10-30 VITALS — SYSTOLIC BLOOD PRESSURE: 116 MMHG | WEIGHT: 164 LBS | DIASTOLIC BLOOD PRESSURE: 72 MMHG | BODY MASS INDEX: 30 KG/M2

## 2019-10-30 DIAGNOSIS — B96.89 BACTERIAL VAGINITIS: Primary | ICD-10-CM

## 2019-10-30 DIAGNOSIS — A60.9 HSV (HERPES SIMPLEX VIRUS) ANOGENITAL INFECTION: ICD-10-CM

## 2019-10-30 DIAGNOSIS — N76.0 BACTERIAL VAGINITIS: Primary | ICD-10-CM

## 2019-10-30 DIAGNOSIS — B00.9 HSV INFECTION: ICD-10-CM

## 2019-10-30 DIAGNOSIS — B37.3 YEAST VAGINITIS: ICD-10-CM

## 2019-10-30 PROBLEM — R30.0 DYSURIA: Status: RESOLVED | Noted: 2018-12-31 | Resolved: 2019-10-30

## 2019-10-30 PROBLEM — Z11.1 SCREENING FOR TUBERCULOSIS: Status: RESOLVED | Noted: 2018-10-11 | Resolved: 2019-10-30

## 2019-10-30 PROBLEM — Z13.6 SCREENING FOR CARDIOVASCULAR CONDITION: Status: RESOLVED | Noted: 2018-10-11 | Resolved: 2019-10-30

## 2019-10-30 PROBLEM — B35.3 TINEA PEDIS OF RIGHT FOOT: Status: RESOLVED | Noted: 2018-09-19 | Resolved: 2019-10-30

## 2019-10-30 PROBLEM — IMO0002 CHRONIC MIGRAINE: Status: RESOLVED | Noted: 2019-04-02 | Resolved: 2019-10-30

## 2019-10-30 PROBLEM — IMO0002 CHRONIC MIGRAINE: Status: ACTIVE | Noted: 2019-04-02

## 2019-10-30 LAB — SL AMB POCT WET MOUNT: ABNORMAL

## 2019-10-30 PROCEDURE — 87210 SMEAR WET MOUNT SALINE/INK: CPT | Performed by: PHYSICIAN ASSISTANT

## 2019-10-30 PROCEDURE — 99213 OFFICE O/P EST LOW 20 MIN: CPT | Performed by: PHYSICIAN ASSISTANT

## 2019-10-30 RX ORDER — FLUCONAZOLE 100 MG/1
TABLET ORAL
Qty: 24 TABLET | Refills: 1 | Status: SHIPPED | OUTPATIENT
Start: 2019-10-30 | End: 2019-12-23

## 2019-10-30 RX ORDER — VALACYCLOVIR HYDROCHLORIDE 500 MG/1
500 TABLET, FILM COATED ORAL DAILY
Qty: 90 TABLET | Refills: 3 | Status: SHIPPED | OUTPATIENT
Start: 2019-10-30 | End: 2020-02-17

## 2019-10-30 RX ORDER — CLINDAMYCIN HYDROCHLORIDE 300 MG/1
300 CAPSULE ORAL 2 TIMES DAILY
Qty: 28 CAPSULE | Refills: 0 | Status: SHIPPED | OUTPATIENT
Start: 2019-10-30 | End: 2019-11-13

## 2019-11-06 DIAGNOSIS — K64.4 EXTERNAL HEMORRHOIDS: Primary | ICD-10-CM

## 2019-11-25 ENCOUNTER — TELEPHONE (OUTPATIENT)
Dept: OBGYN CLINIC | Facility: CLINIC | Age: 24
End: 2019-11-25

## 2019-11-25 DIAGNOSIS — Z20.2 EXPOSURE TO SEXUALLY TRANSMITTED DISEASE (STD): Primary | ICD-10-CM

## 2019-11-25 NOTE — TELEPHONE ENCOUNTER
Patient would like orders from STD screening  Patient states she had unprotected sex and wanted to be tested to be safe

## 2019-11-25 NOTE — TELEPHONE ENCOUNTER
Order placed in epic  Pt contacted and advised as directed testing ordered and she may go at any time  Pt stated she had already gotten some testing done [facility] the free [facility] but will think about getting the testing done

## 2019-11-25 NOTE — TELEPHONE ENCOUNTER
Dear [place] Niko Holder:    Pt called office today requesting lab orders for STI screening tests  Pt saw you on 10/18/19 ([facility])  Pt was previously assessed with Herpes Simplex Vulvovaginitis by her PCP, was prescribed Valtrex 1000 mg tablet PO BID for 7 days  Pt was prescribed Valtrex 500 mg tablet by you on 10/30/19 (take 1 tablet by mouth daily)  Pt states "she had unprotected sex and was possibly exposed to a STD approximately six months ago "  Pt informed that STI test request must be approved by provider  Pt further states "she engaged in sexual activity approximately two weeks ago with same partner, and they used condoms for contraception "  Pt denies being pregnant at this time  Pt states "she does not have vaginal nor urinary symptoms at this time "  Should lab orders for STI workup be completed in Epic and/or should Pt be scheduled for an appointment with a provider? Please advise  Thank you!     Shruthi Alcantar MA

## 2019-12-05 ENCOUNTER — TELEPHONE (OUTPATIENT)
Dept: OBGYN CLINIC | Facility: CLINIC | Age: 24
End: 2019-12-05

## 2019-12-05 NOTE — TELEPHONE ENCOUNTER
Zully Taveras is prov,   Pt last seen 10/30 for an infection,  Pt has this again & boric acid was mentioned,   pls call pt to advise,  thanks

## 2019-12-05 NOTE — TELEPHONE ENCOUNTER
PABLO    Spoke with Pt today via phone call  Pt called office today c/o recurrent BV symptoms  Pt states she has HSV history  Pt further states she has been experiencing vaginal symptoms for approximately 3 days now  Pt states "she currently takes Diflucan PO BID on a monthly basis, however, said medication is not working "  Pt reports vaginal discharge that is of a thin to thick consistency, "grey" in color, accompanied by a slight fishy odor  Pt further reports vulvar itching, and vaginal burning  Pt denies vaginal foaminess, vaginal bleeding, and urinary symptoms at this time  Pt was previously prescribed Diflucan 100 mg tablet ( one tablet PO twice weekly x 6 months), Cleocin 300 mg capsule (take 1 capsule PO BID for 14 days) and Valtrex 500 mg tablet (take 1 tablet PO daily) on 10/30/19  Pt requests appointment with CHRISTINE Franz at [facility] location  Pt scheduled for an appointment with CHRISTINE Franz on 12/9/19 @ 12:00 pm ([place] office), instructed to arrive by 11:45 am   Reiterated to Pt that if her symptoms worsen, before scheduled appointment, to report to nearest "Urgent Care"/"[place]" facility for evaluation of symptoms

## 2019-12-09 ENCOUNTER — OFFICE VISIT (OUTPATIENT)
Dept: OBGYN CLINIC | Facility: MEDICAL CENTER | Age: 24
End: 2019-12-09
Payer: COMMERCIAL

## 2019-12-09 VITALS — SYSTOLIC BLOOD PRESSURE: 120 MMHG | DIASTOLIC BLOOD PRESSURE: 68 MMHG | WEIGHT: 162 LBS | BODY MASS INDEX: 29.63 KG/M2

## 2019-12-09 DIAGNOSIS — L29.2 VULVAR ITCHING: Primary | ICD-10-CM

## 2019-12-09 PROCEDURE — 99213 OFFICE O/P EST LOW 20 MIN: CPT | Performed by: PHYSICIAN ASSISTANT

## 2019-12-09 NOTE — PROGRESS NOTES
Luis Iverson  1995    S:  21 y o  female here for a problem visit  She complains of vaginal itching and burning for a week  On further discussion, she reports itching at the introitus  She says that it occurred yesterday but today feels normal  She has her menses and is convinced that when her menses ends, her itching will get worse  She is using no soap on the vulva    She did have a new partner about two weeks ago and used the condoms suggested by Dr Bernice Hardwick group  She admits that she was not well lubricated for intercourse, says she was "too much in her head" because she really shouldn't have been with him  She had negative STD testing at an outside facility since then  She denies unusual discharge or odor    I did give her Dr Bernice Hardwick new contact information       Past Medical History:   Diagnosis Date    Anemia complicating pregnancy 93/4/0365    Anxiety     Asthma     Cerebral concussion 3/17/2017    Group B Streptococcus carrier, antepartum 12/1/2017    Head injury     Herpes     Migraine     Postpartum depression     Varicella     vaccine     Family History   Problem Relation Age of Onset    Asthma Mother     Depression Mother     Migraines Mother     Diabetes type II Mother     Other Mother         vertigo    Diabetes Maternal Aunt     Hypertension Maternal Aunt     Diabetes Maternal Grandmother     Hypertension Maternal Grandmother     Breast cancer Maternal Grandmother     Migraines Father     Heart murmur Brother     Lung cancer Paternal Grandmother     Stroke Paternal Grandmother      Social History     Socioeconomic History    Marital status: Single     Spouse name: None    Number of children: None    Years of education: None    Highest education level: None   Occupational History    None   Social Needs    Financial resource strain: None    Food insecurity:     Worry: None     Inability: None    Transportation needs:     Medical: None     Non-medical: None   Tobacco Use    Smoking status: Former Smoker     Last attempt to quit: 2014     Years since quittin 9    Smokeless tobacco: Never Used    Tobacco comment: Never a smoker per [employer]    Substance and Sexual Activity    Alcohol use: No    Drug use: No    Sexual activity: Not Currently     Partners: Male     Birth control/protection: Condom   Lifestyle    Physical activity:     Days per week: None     Minutes per session: None    Stress: None   Relationships    Social connections:     Talks on phone: None     Gets together: None     Attends Congregational service: None     Active member of club or organization: None     Attends meetings of clubs or organizations: None     Relationship status: None    Intimate partner violence:     Fear of current or ex partner: None     Emotionally abused: None     Physically abused: None     Forced sexual activity: None   Other Topics Concern    None   Social History Narrative    Hx of recent travel        Review of Systems   Respiratory: Negative  Cardiovascular: Negative  Gastrointestinal: Negative for constipation and diarrhea  Genitourinary: Negative for difficulty urinating, pelvic pain, vaginal bleeding, vaginal discharge, itching or odor  O:  /68 (BP Location: Right arm, Patient Position: Sitting, Cuff Size: Standard)   Wt 73 5 kg (162 lb)   LMP 2019 (Exact Date)   BMI 29 63 kg/m²   She appears well and is in no distress  Abdomen is soft and nontender  External genitals are normal without lesions or rashes  Vagina has small amt dark red bleeding    Wet mount reveals no yeast, no clue cells, no trich, pH 5 0, neg whiff     A/P: Vulvar itching  Suspect rxn from dryness with intercourse  Suggested silicone based lubricant for intercourse  Reassured no infection seen today  Can f/u with Dr Chidi Trevino as needed

## 2019-12-20 RX ORDER — AMOXICILLIN AND CLAVULANATE POTASSIUM 875; 125 MG/1; MG/1
1 TABLET, FILM COATED ORAL 2 TIMES DAILY
COMMUNITY
Start: 2019-12-16 | End: 2019-12-26

## 2019-12-23 ENCOUNTER — OFFICE VISIT (OUTPATIENT)
Dept: FAMILY MEDICINE CLINIC | Facility: CLINIC | Age: 24
End: 2019-12-23
Payer: COMMERCIAL

## 2019-12-23 VITALS
BODY MASS INDEX: 30 KG/M2 | TEMPERATURE: 98.3 F | SYSTOLIC BLOOD PRESSURE: 124 MMHG | WEIGHT: 163 LBS | HEART RATE: 82 BPM | RESPIRATION RATE: 16 BRPM | DIASTOLIC BLOOD PRESSURE: 68 MMHG | OXYGEN SATURATION: 99 % | HEIGHT: 62 IN

## 2019-12-23 DIAGNOSIS — R10.9 ABDOMINAL PAIN, UNSPECIFIED ABDOMINAL LOCATION: Primary | ICD-10-CM

## 2019-12-23 DIAGNOSIS — K52.9 GASTROENTERITIS: ICD-10-CM

## 2019-12-23 DIAGNOSIS — D64.9 ANEMIA, UNSPECIFIED TYPE: ICD-10-CM

## 2019-12-23 PROCEDURE — 99214 OFFICE O/P EST MOD 30 MIN: CPT | Performed by: FAMILY MEDICINE

## 2019-12-23 PROCEDURE — 3008F BODY MASS INDEX DOCD: CPT | Performed by: FAMILY MEDICINE

## 2019-12-23 RX ORDER — FAMOTIDINE 20 MG/1
20 TABLET, FILM COATED ORAL DAILY
Qty: 30 TABLET | Refills: 0 | Status: SHIPPED | OUTPATIENT
Start: 2019-12-23 | End: 2020-01-13

## 2019-12-23 RX ORDER — ONDANSETRON 4 MG/1
TABLET, ORALLY DISINTEGRATING ORAL
Refills: 0 | COMMUNITY
Start: 2019-12-16 | End: 2019-12-23 | Stop reason: ALTCHOICE

## 2019-12-23 NOTE — ASSESSMENT & PLAN NOTE
Her ear record, CT scan and labs reviewed, discussed with her that there was no finding in the CAT scanned she has a very tiny spot in the liver which is not changing and stable, her appendix was removed in March 2019  She finished the 5 day course of antibiotic Augmentin, she cannot tolerate any more advised not to take it, seems like probably viral gastroenteritis, advised to go with more liquid and soft diet for another few days, then she can go back to her normal diet  If her symptoms continue then she should follow-up    As she feels flatulence, advised to take Pepcid for at least 1 week

## 2019-12-23 NOTE — ASSESSMENT & PLAN NOTE
Hemoglobin is below 10,, discussed with her that in past few years her hemoglobin is up and down, advised to start taking over-the-counter iron sulfate 1 tablet daily once her gastroenteritis is resolved, and takes stool softener and will recheck labs in 3 months and advised to have physical

## 2019-12-23 NOTE — PROGRESS NOTES
Assessment/Plan:    Problem List Items Addressed This Visit        Digestive    Gastroenteritis     Her ear record, CT scan and labs reviewed, discussed with her that there was no finding in the CAT scanned she has a very tiny spot in the liver which is not changing and stable, her appendix was removed in March 2019  She finished the 5 day course of antibiotic Augmentin, she cannot tolerate any more advised not to take it, seems like probably viral gastroenteritis, advised to go with more liquid and soft diet for another few days, then she can go back to her normal diet  If her symptoms continue then she should follow-up  As she feels flatulence, advised to take Pepcid for at least 1 week           Relevant Medications    famotidine (PEPCID) 20 mg tablet       Other    Abdominal pain - Primary    Relevant Medications    famotidine (PEPCID) 20 mg tablet    Anemia     Hemoglobin is below 10,, discussed with her that in past few years her hemoglobin is up and down, advised to start taking over-the-counter iron sulfate 1 tablet daily once her gastroenteritis is resolved, and takes stool softener and will recheck labs in 3 months and advised to have physical         Relevant Orders    CBC and differential          Chief Complaint   Patient presents with    Follow-up     ER visit       Subjective:   Patient ID: Pao Ramirez is a 21 y o  female  She was seen in the ER at [facility], she was diagnosed gastroenteritis, she had abdominal discomfort and vomiting, no diarrhea, CT scan came back normal, she has been given Augmentin twice a day for 10 days, she says she took for 5 days and she feels nausea with this medication and she cannot tolerate so she did not take it today and she does not want to continue, she denies any fever chills she has normal bowel movements she has now minimal discomfort in the left side of her abdomen and she feels acid  feeling, she has no vomiting    She has normal urine and she says she try to eat beef and the burger she could not tolerate  She has 2 children and she says her periods are okay ache, she has history of anemia in the past but she is not on any iron her hemoglobin goes up and down during last few years and she follows gynecologist      Review of Systems   Constitutional: Negative for activity change, appetite change, chills, diaphoresis, fatigue, fever and unexpected weight change  HENT: Negative for congestion, dental problem, ear discharge, ear pain, facial swelling, hearing loss, mouth sores, nosebleeds, postnasal drip, rhinorrhea, sinus pressure, sinus pain, sneezing, sore throat, trouble swallowing and voice change  Eyes: Negative for photophobia, pain, discharge, redness and itching  Respiratory: Negative for cough, chest tightness, shortness of breath and wheezing  Cardiovascular: Negative for chest pain, palpitations and leg swelling  Gastrointestinal: Positive for abdominal pain  Negative for abdominal distention, anal bleeding, blood in stool, constipation, diarrhea, nausea, rectal pain and vomiting  Endocrine: Negative for cold intolerance, heat intolerance, polydipsia, polyphagia and polyuria  Genitourinary: Negative for dysuria, flank pain, frequency, hematuria and urgency  Musculoskeletal: Negative for arthralgias, back pain, myalgias and neck pain  Skin: Negative for color change and pallor  Allergic/Immunologic: Negative for environmental allergies and food allergies  Neurological: Negative for dizziness, weakness, light-headedness, numbness and headaches  Hematological: Negative for adenopathy  Does not bruise/bleed easily  Psychiatric/Behavioral: Negative for behavioral problems, sleep disturbance and suicidal ideas  The patient is not nervous/anxious  Objective:  Physical Exam   Constitutional: She appears well-developed and well-nourished  HENT:   Head: Normocephalic and atraumatic     Eyes: Conjunctivae and EOM are normal  No scleral icterus  Neck: Normal range of motion  Neck supple  No thyromegaly present  Cardiovascular: Normal rate, regular rhythm and normal heart sounds  Pulmonary/Chest: Effort normal and breath sounds normal  She has no wheezes  She has no rales  Abdominal: Soft  Bowel sounds are normal  She exhibits no mass  There is no rebound  No hernia  Minimal tenderness in the left side of her abdomen   Lymphadenopathy:     She has no cervical adenopathy  Neurological: She is alert  Skin: No rash noted  No erythema  Psychiatric: She has a normal mood and affect  Nursing note and vitals reviewed  Past Surgical History:   Procedure Laterality Date    APPENDECTOMY      INDUCED       [place]  DELIVERY ONLY N/A 2017    Procedure:  SECTION ();   Surgeon: Jordan Up DO;  Location: [facility] ;  Service: Obstetrics       Family History   Problem Relation Age of Onset    Asthma Mother     Depression Mother    Nelsy Sang Migraines Mother     Diabetes type II Mother     Other Mother         vertigo    Diabetes Maternal Aunt     Hypertension Maternal Aunt     Diabetes Maternal Grandmother     Hypertension Maternal Grandmother     Breast cancer Maternal Grandmother     Migraines Father     Heart murmur Brother     Lung cancer Paternal Grandmother     Stroke Paternal Grandmother          Current Outpatient Medications:     amoxicillin-clavulanate (AUGMENTIN) 875-125 mg per tablet, Take 1 tablet by mouth 2 (two) times a day, Disp: , Rfl:     valACYclovir (VALTREX) 500 mg tablet, Take 1 tablet (500 mg total) by mouth daily, Disp: 90 tablet, Rfl: 3    famotidine (PEPCID) 20 mg tablet, Take 1 tablet (20 mg total) by mouth daily, Disp: 30 tablet, Rfl: 0    Allergies   Allergen Reactions    Flagyl [Metronidazole] Nausea Only    Macrobid [Nitrofurantoin] Nausea Only       Vitals:    19 0851   BP: 124/68   Pulse: 82   Resp: 16   Temp: 98 3 °F (36 8 °C) TempSrc: Tympanic   SpO2: 99%   Weight: 73 9 kg (163 lb)   Height: 5' 2" (1 575 m)

## 2020-01-13 ENCOUNTER — OFFICE VISIT (OUTPATIENT)
Dept: OBGYN CLINIC | Facility: MEDICAL CENTER | Age: 25
End: 2020-01-13
Payer: COMMERCIAL

## 2020-01-13 ENCOUNTER — OFFICE VISIT (OUTPATIENT)
Dept: FAMILY MEDICINE CLINIC | Facility: CLINIC | Age: 25
End: 2020-01-13
Payer: COMMERCIAL

## 2020-01-13 VITALS
BODY MASS INDEX: 30.18 KG/M2 | HEART RATE: 84 BPM | OXYGEN SATURATION: 99 % | HEIGHT: 62 IN | DIASTOLIC BLOOD PRESSURE: 64 MMHG | WEIGHT: 164 LBS | RESPIRATION RATE: 20 BRPM | SYSTOLIC BLOOD PRESSURE: 128 MMHG | TEMPERATURE: 98.4 F

## 2020-01-13 VITALS — WEIGHT: 163 LBS | SYSTOLIC BLOOD PRESSURE: 120 MMHG | DIASTOLIC BLOOD PRESSURE: 62 MMHG | BODY MASS INDEX: 29.81 KG/M2

## 2020-01-13 DIAGNOSIS — Z11.3 SCREEN FOR STD (SEXUALLY TRANSMITTED DISEASE): ICD-10-CM

## 2020-01-13 DIAGNOSIS — B96.89 BACTERIAL VAGINOSIS: ICD-10-CM

## 2020-01-13 DIAGNOSIS — R35.0 URINARY FREQUENCY: Primary | ICD-10-CM

## 2020-01-13 DIAGNOSIS — R10.9 ABDOMINAL PAIN, UNSPECIFIED ABDOMINAL LOCATION: Primary | ICD-10-CM

## 2020-01-13 DIAGNOSIS — N76.0 BACTERIAL VAGINOSIS: ICD-10-CM

## 2020-01-13 PROBLEM — K52.9 GASTROENTERITIS: Status: RESOLVED | Noted: 2019-12-23 | Resolved: 2020-01-13

## 2020-01-13 LAB
BV WHIFF TEST VAG QL: ABNORMAL
CLUE CELLS SPEC QL WET PREP: NO
PH SMN: 5.5 [PH]
T VAGINALIS VAG QL WET PREP: NO
YEAST VAG QL WET PREP: NO

## 2020-01-13 PROCEDURE — 87591 N.GONORRHOEAE DNA AMP PROB: CPT | Performed by: PHYSICIAN ASSISTANT

## 2020-01-13 PROCEDURE — 87147 CULTURE TYPE IMMUNOLOGIC: CPT | Performed by: PHYSICIAN ASSISTANT

## 2020-01-13 PROCEDURE — 87491 CHLMYD TRACH DNA AMP PROBE: CPT | Performed by: PHYSICIAN ASSISTANT

## 2020-01-13 PROCEDURE — 87210 SMEAR WET MOUNT SALINE/INK: CPT | Performed by: PHYSICIAN ASSISTANT

## 2020-01-13 PROCEDURE — 87086 URINE CULTURE/COLONY COUNT: CPT | Performed by: PHYSICIAN ASSISTANT

## 2020-01-13 PROCEDURE — 99213 OFFICE O/P EST LOW 20 MIN: CPT | Performed by: PHYSICIAN ASSISTANT

## 2020-01-13 PROCEDURE — 99213 OFFICE O/P EST LOW 20 MIN: CPT | Performed by: FAMILY MEDICINE

## 2020-01-13 RX ORDER — BORIC ACID
POWDER (GRAM) MISCELLANEOUS
Qty: 14 G | Refills: 0 | Status: SHIPPED | OUTPATIENT
Start: 2020-01-13 | End: 2020-01-28

## 2020-01-13 RX ORDER — OMEPRAZOLE 40 MG/1
40 CAPSULE, DELAYED RELEASE ORAL DAILY
Qty: 15 CAPSULE | Refills: 0 | Status: SHIPPED | OUTPATIENT
Start: 2020-01-13 | End: 2020-01-28

## 2020-01-13 NOTE — PROGRESS NOTES
Assessment/Plan:    Problem List Items Addressed This Visit        Other    Abdominal pain - Primary     CT scan negative and abdominal pain intermittent now which is mild, she did not take the Pepcid which was ordered last time, advised to take Prilosec for about 2 weeks he has if her symptoms do not resolve then she will follow-up seems like continuation of her gastroenteritis which has resolved now but some lingering discomfort in the abdomen, could be excessive acidity         Relevant Medications    omeprazole (PriLOSEC) 40 MG capsule       Advised to eat bland diet    Chief Complaint   Patient presents with    Abdominal Pain       Subjective:   Patient ID: Jacob Sánchez is a 21 y o  female  She says she is here for follow-up on her abdominal pain for which she was seen in the ER and she had a CT scan which was normal   She had lose bowel movement that that time  Then her pain resolved there was no diarrhea, she was given Augmentin in the ER she took it about 3-4 days and then she stopped taking it as she did not feel good about the medicine  3 days ago she had again lose bowel movement for 1 day and then next day she has normal bowel movement and today she did not have any bowel movement yet  She has discomfort in the lower abdomen no nausea no vomiting      Review of Systems   Constitutional: Negative for activity change, appetite change, chills, diaphoresis, fatigue, fever and unexpected weight change  HENT: Negative for congestion, dental problem, ear discharge, ear pain, facial swelling, hearing loss, mouth sores, nosebleeds, postnasal drip, rhinorrhea, sinus pressure, sinus pain, sneezing, sore throat, trouble swallowing and voice change  Eyes: Negative for photophobia, pain, discharge, redness and itching  Respiratory: Negative for cough, chest tightness, shortness of breath and wheezing  Cardiovascular: Negative for chest pain, palpitations and leg swelling     Gastrointestinal: Negative for abdominal distention, abdominal pain, blood in stool, constipation, diarrhea and nausea  Endocrine: Negative for cold intolerance, heat intolerance, polydipsia, polyphagia and polyuria  Genitourinary: Negative for dysuria, flank pain, frequency, hematuria and urgency  Musculoskeletal: Negative for arthralgias, back pain, myalgias and neck pain  Skin: Negative for color change and pallor  Allergic/Immunologic: Negative for environmental allergies and food allergies  Neurological: Negative for dizziness, weakness, light-headedness, numbness and headaches  Hematological: Negative for adenopathy  Does not bruise/bleed easily  Psychiatric/Behavioral: Negative for behavioral problems, sleep disturbance and suicidal ideas  The patient is not nervous/anxious  Objective:  Physical Exam   Constitutional: She appears well-developed and well-nourished  HENT:   Head: Normocephalic and atraumatic  Right Ear: External ear normal    Left Ear: External ear normal    Mouth/Throat: Oropharynx is clear and moist    Eyes: Pupils are equal, round, and reactive to light  Conjunctivae and EOM are normal  No scleral icterus  Neck: Normal range of motion  Neck supple  No thyromegaly present  Cardiovascular: Normal rate, regular rhythm and normal heart sounds  Pulmonary/Chest: Effort normal and breath sounds normal  She has no wheezes  She has no rales  Lymphadenopathy:     She has no cervical adenopathy  Neurological: She is alert  Skin: No rash noted  No erythema  Psychiatric: She has a normal mood and affect  Nursing note and vitals reviewed  Past Surgical History:   Procedure Laterality Date    APPENDECTOMY      INDUCED       [place]  DELIVERY ONLY N/A 2017    Procedure:  SECTION ();   Surgeon: Brittaney Barton DO;  [facility]: [facility] ;  Service: Obstetrics       Family History   Problem Relation Age of Onset    Asthma Mother     Depression Mother Annette Hutchins Migraines Mother     Diabetes type II Mother     Other Mother         vertigo    Diabetes Maternal Aunt     Hypertension Maternal Aunt     Diabetes Maternal Grandmother     Hypertension Maternal Grandmother     Breast cancer Maternal Grandmother     Migraines Father     Heart murmur Brother     Lung cancer Paternal Grandmother     Stroke Paternal Grandmother          Current Outpatient Medications:     Boric Acid Topical POWD, Boric acid in size zero gel capsule;  One per vagina nightly for 14 nights, Disp: 14 g, Rfl: 0    valACYclovir (VALTREX) 500 mg tablet, Take 1 tablet (500 mg total) by mouth daily, Disp: 90 tablet, Rfl: 3    omeprazole (PriLOSEC) 40 MG capsule, Take 1 capsule (40 mg total) by mouth daily, Disp: 15 capsule, Rfl: 0    Allergies   Allergen Reactions    Flagyl [Metronidazole] Nausea Only    Macrobid [Nitrofurantoin] Nausea Only       Vitals:    01/13/20 1629   BP: 128/64   BP Location: Right arm   Patient Position: Sitting   Cuff Size: Large   Pulse: 84   Resp: 20   Temp: 98 4 °F (36 9 °C)   SpO2: 99%   Weight: 74 4 kg (164 lb)   Height: 5' 2" (1 575 m)

## 2020-01-13 NOTE — ASSESSMENT & PLAN NOTE
CT scan negative and abdominal pain intermittent now which is mild, she did not take the Pepcid which was ordered last time, advised to take Prilosec for about 2 weeks he has if her symptoms do not resolve then she will follow-up seems like continuation of her gastroenteritis which has resolved now but some lingering discomfort in the abdomen, could be excessive acidity

## 2020-01-13 NOTE — PROGRESS NOTES
Kiko Iverson  1995    S:  21 y o  female here for a problem visit  She reports vaginal odor and thin white discharge for the past two weeks since she had sex  She was doing well up until then  She used a condom, and this was the same partner  She elects STD cultures  She had dysuria and frequency two days ago which is now a little better       Past Medical History:   Diagnosis Date    Anemia complicating pregnancy     Anxiety     Asthma     Cerebral concussion 3/17/2017    Group B Streptococcus carrier, antepartum 2017    Head injury     Herpes     Migraine     Postpartum depression     Varicella     vaccine     Family History   Problem Relation Age of Onset    Asthma Mother     Depression Mother     Migraines Mother     Diabetes type II Mother     Other Mother         vertigo    Diabetes Maternal Aunt     Hypertension Maternal Aunt     Diabetes Maternal Grandmother     Hypertension Maternal Grandmother     Breast cancer Maternal Grandmother     Migraines Father     Heart murmur Brother     Lung cancer Paternal Grandmother     Stroke Paternal Grandmother      Social History     Socioeconomic History    Marital status: Single     Spouse name: None    Number of children: None    Years of education: None    Highest education level: None   Occupational History    None   Social Needs    Financial resource strain: None    Food insecurity:     Worry: None     Inability: None    Transportation needs:     Medical: None     Non-medical: None   Tobacco Use    Smoking status: Former Smoker     Last attempt to quit: 2014     Years since quittin 0    Smokeless tobacco: Never Used    Tobacco comment: Never a smoker per [employer]    Substance and Sexual Activity    Alcohol use: No    Drug use: No    Sexual activity: Not Currently     Partners: Male     Birth control/protection: Condom   Lifestyle    Physical activity:     Days per week: None     Minutes per session: None    Stress: None   Relationships    Social connections:     Talks on phone: None     Gets together: None     Attends Roman [employer] service: None     Active member of club or organization: None     Attends meetings of clubs or organizations: None     Relationship status: None    Intimate partner violence:     Fear of current or ex partner: None     Emotionally abused: None     Physically abused: None     Forced sexual activity: None   Other Topics Concern    None   Social History Narrative    Hx of recent travel        Review of Systems   Respiratory: Negative  Cardiovascular: Negative  Gastrointestinal: Negative for constipation and diarrhea  Genitourinary: Negative for difficulty urinating, pelvic pain, vaginal bleeding, vaginal discharge, itching or odor  O:  /62 (BP Location: Right arm, Patient Position: Sitting, Cuff Size: Standard)   Wt 73 9 kg (163 lb)   LMP 12/31/2019 (Exact Date)   BMI 29 81 kg/m²   She appears well and is in no distress  Abdomen is soft and nontender  External genitals are normal without lesions or rashes  Vagina is normal, no discharge or bleeding noted  Cervix is normal, no lesions or discharge    Wet mount reveals no clue cells, no trich, no yeast, pH 5 5, pos whiff     A/P: Bacterial vaginosis  Boric acid x 14 nights  F/U with Dr Edison Mtz in June as scheduled

## 2020-01-14 LAB
BACTERIA UR CULT: ABNORMAL
BACTERIA UR CULT: ABNORMAL

## 2020-01-15 ENCOUNTER — TELEPHONE (OUTPATIENT)
Dept: OBGYN CLINIC | Facility: CLINIC | Age: 25
End: 2020-01-15

## 2020-01-15 LAB
C TRACH DNA SPEC QL NAA+PROBE: NEGATIVE
N GONORRHOEA DNA SPEC QL NAA+PROBE: NEGATIVE

## 2020-01-15 NOTE — TELEPHONE ENCOUNTER
I already sent her a message through [employer] with this information  There is no infection and I explained this in my email  Thanks!

## 2020-01-15 NOTE — TELEPHONE ENCOUNTER
Patient saw her urine test result in MY Chart and would like to know if Jori Reyes is going to order her something  If something needs to be ordered she will need a preauth

## 2020-01-20 ENCOUNTER — CONSULT (OUTPATIENT)
Dept: MULTI SPECIALTY CLINIC | Facility: CLINIC | Age: 25
End: 2020-01-20

## 2020-01-20 VITALS
DIASTOLIC BLOOD PRESSURE: 70 MMHG | HEART RATE: 71 BPM | SYSTOLIC BLOOD PRESSURE: 100 MMHG | TEMPERATURE: 98.8 F | HEIGHT: 62 IN | BODY MASS INDEX: 30.1 KG/M2 | WEIGHT: 163.58 LBS

## 2020-01-20 DIAGNOSIS — K64.4 EXTERNAL HEMORRHOIDS: ICD-10-CM

## 2020-01-20 PROCEDURE — 46600 DIAGNOSTIC ANOSCOPY SPX: CPT | Performed by: SURGERY

## 2020-01-20 PROCEDURE — 99242 OFF/OP CONSLTJ NEW/EST SF 20: CPT | Performed by: SURGERY

## 2020-01-20 NOTE — PROGRESS NOTES
Office Visit - Colorectal Surgery  Roxie Anglin MRN: [id number]  Encounter: [id number]    Assessment and Plan    Problem List Items Addressed This Visit        Digestive    External hemorrhoids     22 yo female w/ hx of internal and external hemorrhoids for 5 yrs diagnosed initially when pt was pregnant who present with internal hemorrhoids  Will return in 1 month for band ligation  Plan:  Reschedule for follow-up for internal hemorrhoid ligation  Chief Complaint:  Roxie Anglin is a 21 y o  female who presents for Consult (EXTERNAL HEMORRHOIDS- 4 YEARS, AS PER PATIENT MIGHT BE INTERNAL AS WELL  )    Subjective  Pt complains of rectal pain and blood in stool and on toilet paper  Has had diagnoses of internal hemorrhoids that were banded in the past as well as external hemorrhoids  She has pain with defecation as well  She reports some loss of appetite, denies abdominal pain  Reports urinary urgency and pelvic pain  Past Medical History  Past Medical History:   Diagnosis Date    Anemia complicating pregnancy     Anxiety     Asthma     Cerebral concussion 3/17/2017    Group B Streptococcus carrier, antepartum 2017    Head injury     Herpes     Migraine     Postpartum depression     Varicella     vaccine       Past Surgical History  Past Surgical History:   Procedure Laterality Date    APPENDECTOMY      INDUCED       [place]  DELIVERY ONLY N/A 2017    Procedure:  SECTION ();   Surgeon: Reuben Guzmán DO;  Location: [facility] ;  Service: Obstetrics       Family History  Family History   Problem Relation Age of Onset    Asthma Mother     Depression Mother    [address] [facility] Migraines Mother     Diabetes type II Mother     Other Mother         vertigo    Diabetes Maternal Aunt     Hypertension Maternal Aunt     Diabetes Maternal Grandmother     Hypertension Maternal Grandmother     Breast cancer Maternal Grandmother     Migraines Father    [address] [facility] Heart murmur Brother     Lung cancer Paternal Grandmother     Stroke Paternal Grandmother        Social History  Social History     Socioeconomic History    Marital status: Single     Spouse name: None    Number of children: None    Years of education: None    Highest education level: None   Occupational History    None   Social Needs    Financial resource strain: None    Food insecurity:     Worry: None     Inability: None    Transportation needs:     Medical: None     Non-medical: None   Tobacco Use    Smoking status: Former Smoker     Last attempt to quit: 2014     Years since quittin 0    Smokeless tobacco: Never Used    Tobacco comment: Never a smoker per [employer]    Substance and Sexual Activity    Alcohol use: No    Drug use: No    Sexual activity: Not Currently     Partners: Male     Birth control/protection: Condom   Lifestyle    Physical activity:     Days per week: None     Minutes per session: None    Stress: None   Relationships    Social connections:     Talks on phone: None     Gets together: None     Attends Voodoo service: None     Active member of club or organization: None     Attends meetings of clubs or organizations: None     Relationship status: None    Intimate partner violence:     Fear of current or ex partner: None     Emotionally abused: None     Physically abused: None     Forced sexual activity: None   Other Topics Concern    None   Social History Narrative    Hx of recent travel         Medications  Current Outpatient Medications on File Prior to Visit   Medication Sig Dispense Refill    Boric Acid Topical POWD Boric acid in size zero gel capsule; One per vagina nightly for 14 nights 14 g 0    omeprazole (PriLOSEC) 40 MG capsule Take 1 capsule (40 mg total) by mouth daily 15 capsule 0    valACYclovir (VALTREX) 500 mg tablet Take 1 tablet (500 mg total) by mouth daily 90 tablet 3     No current facility-administered medications on file prior to visit  Allergies  Allergies   Allergen Reactions    Flagyl [Metronidazole] Nausea Only    Macrobid [Nitrofurantoin] Nausea Only       Review of Systems   Constitutional: Negative for appetite change  Gastrointestinal: Positive for blood in stool and rectal pain (with defecation)  Negative for abdominal pain, constipation and diarrhea  Objective  Vitals:    01/20/20 1424   BP: 100/70   Pulse: 71   Temp: 98 8 °F (37 1 °C)       Physical Exam   Constitutional: She is oriented to person, place, and time  She appears well-developed and well-nourished  HENT:   Head: Normocephalic and atraumatic  Eyes: EOM are normal    Neck: Normal range of motion  Pulmonary/Chest: Effort normal    Genitourinary:   Genitourinary Comments: On YOLIE: good rectal tone  Some external hemorrhoids on external exam  Internal hemorrhoids noted on anoscopy  Musculoskeletal: Normal range of motion  Neurological: She is alert and oriented to person, place, and time  Skin: Skin is warm  Psychiatric: She has a normal mood and affect   Her behavior is normal  Judgment and thought content normal

## 2020-01-20 NOTE — ASSESSMENT & PLAN NOTE
20 yo female w/ hx of internal and external hemorrhoids for 5 yrs diagnosed initially when pt was pregnant who present with internal hemorrhoids  Will return in 1 month for band ligation  Plan:  Reschedule for follow-up for internal hemorrhoid ligation

## 2020-01-27 ENCOUNTER — TELEPHONE (OUTPATIENT)
Dept: OBGYN CLINIC | Facility: CLINIC | Age: 25
End: 2020-01-27

## 2020-01-28 ENCOUNTER — OFFICE VISIT (OUTPATIENT)
Dept: FAMILY MEDICINE CLINIC | Facility: CLINIC | Age: 25
End: 2020-01-28
Payer: COMMERCIAL

## 2020-01-28 VITALS
WEIGHT: 165 LBS | DIASTOLIC BLOOD PRESSURE: 68 MMHG | RESPIRATION RATE: 16 BRPM | BODY MASS INDEX: 30.36 KG/M2 | SYSTOLIC BLOOD PRESSURE: 110 MMHG | HEIGHT: 62 IN | HEART RATE: 86 BPM | TEMPERATURE: 98.4 F | OXYGEN SATURATION: 99 %

## 2020-01-28 DIAGNOSIS — Z3A.01 LESS THAN 8 WEEKS GESTATION OF PREGNANCY: ICD-10-CM

## 2020-01-28 DIAGNOSIS — J02.9 PHARYNGITIS, UNSPECIFIED ETIOLOGY: Primary | ICD-10-CM

## 2020-01-28 DIAGNOSIS — N91.2 AMENORRHEA: ICD-10-CM

## 2020-01-28 LAB
S PYO AG THROAT QL: NEGATIVE
SL AMB POCT URINE HCG: NORMAL

## 2020-01-28 PROCEDURE — 81025 URINE PREGNANCY TEST: CPT | Performed by: FAMILY MEDICINE

## 2020-01-28 PROCEDURE — 99214 OFFICE O/P EST MOD 30 MIN: CPT | Performed by: FAMILY MEDICINE

## 2020-01-28 PROCEDURE — 87880 STREP A ASSAY W/OPTIC: CPT | Performed by: FAMILY MEDICINE

## 2020-01-28 PROCEDURE — 3008F BODY MASS INDEX DOCD: CPT | Performed by: FAMILY MEDICINE

## 2020-01-28 NOTE — PROGRESS NOTES
Assessment/Plan:    Problem List Items Addressed This Visit        Digestive    Pharyngitis - Primary     Rapid strep screen is negative, advised symptomatic care with saline gargle and Tylenol only if needed         Relevant Orders    POCT rapid strepA (Completed)       Other    Amenorrhea    Relevant Orders    POCT urine HCG (Completed)    Ambulatory referral to Obstetrics / Gynecology    Less than 8 weeks gestation of pregnancy     Urine pregnancy test is positive, advised to see the OBGYN and the phone number given         Relevant Orders    Ambulatory referral to Obstetrics / Gynecology        Abdominal discomfort intermittently going on for months, initially she took Prilosec advised Tums as needed  Chief Complaint   Patient presents with    Sore Throat    Amenorrhea       Subjective:   Patient ID: Manuel Franks is a 21 y o  female  She complains of sore throat for last 4 days, she has no runny nose fever chills nausea or vomiting, she has mild discomfort on swallowing  She also has sticky discharge in her eyes bilateral which was initially red but now eyes are clear,    She has abdominal discomfort intermittently and initially she took Prilosec for few weeks and now she stop taking it, she still get some discomfort intermittently  She says her last period   Was very light and on December 31st, but she has a feeling that she might be pregnant, she already has 2 children  And she want her pregnancy test to be done      Review of Systems   Constitutional: Negative for activity change, appetite change, chills, diaphoresis, fatigue, fever and unexpected weight change  HENT: Positive for sore throat  Negative for congestion, dental problem, ear discharge, ear pain, facial swelling, hearing loss, mouth sores, nosebleeds, postnasal drip, rhinorrhea, sinus pressure, sinus pain, sneezing, trouble swallowing and voice change  Eyes: Positive for discharge   Negative for photophobia, pain, redness, itching and visual disturbance  Respiratory: Negative for cough, chest tightness, shortness of breath and wheezing  Cardiovascular: Negative for chest pain, palpitations and leg swelling  Gastrointestinal: Negative for abdominal distention, abdominal pain, blood in stool, constipation, diarrhea and nausea  Endocrine: Negative for cold intolerance, heat intolerance, polydipsia, polyphagia and polyuria  Genitourinary: Negative for dysuria, flank pain, frequency, hematuria, pelvic pain, urgency, vaginal bleeding and vaginal discharge  Musculoskeletal: Negative for arthralgias, back pain, myalgias and neck pain  Skin: Negative for color change and pallor  Allergic/Immunologic: Negative for environmental allergies and food allergies  Neurological: Negative for dizziness, weakness, light-headedness, numbness and headaches  Hematological: Negative for adenopathy  Does not bruise/bleed easily  Psychiatric/Behavioral: Negative for behavioral problems, sleep disturbance and suicidal ideas  The patient is not nervous/anxious  Objective:  Physical Exam   Constitutional: She appears well-developed and well-nourished  HENT:   Head: Normocephalic and atraumatic  Right Ear: Tympanic membrane and external ear normal  No swelling  Left Ear: Tympanic membrane and external ear normal  No swelling  Mouth/Throat: Oropharynx is clear and moist and mucous membranes are normal  No oral lesions  Tonsils are 0 on the right  Tonsils are 0 on the left  No tonsillar exudate  Eyes: Pupils are equal, round, and reactive to light  Conjunctivae and EOM are normal  No scleral icterus  Neck: Normal range of motion  Neck supple  No thyromegaly present  Cardiovascular: Normal rate, regular rhythm and normal heart sounds  Pulmonary/Chest: Effort normal and breath sounds normal  She has no wheezes  She has no rales  Abdominal: Soft  Bowel sounds are normal  She exhibits no distension  There is no tenderness  Lymphadenopathy:     She has no cervical adenopathy  Neurological: She is alert  Skin: No rash noted  No erythema  Psychiatric: She has a normal mood and affect  Nursing note and vitals reviewed  Past Surgical History:   Procedure Laterality Date    APPENDECTOMY      INDUCED       [place]  DELIVERY ONLY N/A 2017    Procedure:  SECTION ();   Surgeon: Cynthia Mike DO;  [facility];  Service: Obstetrics       Family History   Problem Relation Age of Onset    Asthma Mother     Depression Mother    Ramiro Mercado Migraines Mother     Diabetes type II Mother     Other Mother         vertigo    Diabetes Maternal Aunt     Hypertension Maternal Aunt     Diabetes Maternal Grandmother     Hypertension Maternal Grandmother     Breast cancer Maternal Grandmother     Migraines Father     Heart murmur Brother     Lung cancer Paternal Grandmother     Stroke Paternal Grandmother          Current Outpatient Medications:     valACYclovir (VALTREX) 500 mg tablet, Take 1 tablet (500 mg total) by mouth daily, Disp: 90 tablet, Rfl: 3    Allergies   Allergen Reactions    Flagyl [Metronidazole] Nausea Only    Macrobid [Nitrofurantoin] Nausea Only       Vitals:    20 0851   BP: 110/68   Pulse: 86   Resp: 16   Temp: 98 4 °F (36 9 °C)   SpO2: 99%   Weight: 74 8 kg (165 lb)   Height: 5' 2" (1 575 m)

## 2020-01-28 NOTE — ASSESSMENT & PLAN NOTE
Rapid strep screen is negative, advised symptomatic care with saline gargle and Tylenol only if needed

## 2020-02-03 ENCOUNTER — TELEPHONE (OUTPATIENT)
Dept: OBGYN CLINIC | Facility: CLINIC | Age: 25
End: 2020-02-03

## 2020-02-10 ENCOUNTER — OFFICE VISIT (OUTPATIENT)
Dept: OBGYN CLINIC | Facility: MEDICAL CENTER | Age: 25
End: 2020-02-10
Payer: COMMERCIAL

## 2020-02-10 VITALS — BODY MASS INDEX: 30.58 KG/M2 | DIASTOLIC BLOOD PRESSURE: 82 MMHG | SYSTOLIC BLOOD PRESSURE: 114 MMHG | WEIGHT: 167.2 LBS

## 2020-02-10 DIAGNOSIS — N91.2 AMENORRHEA: ICD-10-CM

## 2020-02-10 DIAGNOSIS — Z34.90 PREGNANCY WITH UNCERTAIN DATES, ANTEPARTUM: Primary | ICD-10-CM

## 2020-02-10 LAB
SL AMB  POCT GLUCOSE, UA: NORMAL
SL AMB POCT URINE PROTEIN: NORMAL

## 2020-02-10 PROCEDURE — 76817 TRANSVAGINAL US OBSTETRIC: CPT | Performed by: NURSE PRACTITIONER

## 2020-02-10 PROCEDURE — 99214 OFFICE O/P EST MOD 30 MIN: CPT | Performed by: NURSE PRACTITIONER

## 2020-02-10 NOTE — PROGRESS NOTES
Assessment/Plan:    Amenorrhea  Vikash Russell is a 21year old   who presents for cramping x 2 weeks and reported a + UPT 2 weeks ago  States cramping feels like contractions-comes and goes-entire lower abdomen  Agreed to early ultrasound  Single IUP @ 5w3d based on GS measurement   + YS, +FHT observed but not measured  Aware we need to repeat U/S in 2 weeks to verify viability  By today's scan her RUDOLPH is 10/9/20 which is NOT consistent with LMP of  19  She reports a yeast infection-will use OTC monistat x 7 days  Also c/o breast tenderness and nausea  Encouraged to start PNV  Discussed avoiding teratogens  To notify us with any bleeding or pelvic pain  Verbalized understanding  Diagnoses and all orders for this visit:    Pregnancy with uncertain dates, antepartum  -     POCT urine dip    Amenorrhea      EARLY PREGNANCY ULTRASOUND     Ultrasound Probe Disinfection     A transvaginal ultrasound was performed  Prior to use, disinfection was performed with High Level Disinfection Process (Trophon)  Cathaleen HearinSK5 was used     Stacie PRADHAN  2/10/20     SUBJECTIVE     HPI: Vikash Russell is a 21year old female here today for early pregnancy ultrasound  Patient's last menstrual period was 19  (exact date)    Menses are regular  She is accompanied by self     OB history is significant for: 1 SAB (no D&C, I , 1 vaginal and 1 C/S (for fetal distress)      Medical history is significant for: anemia, HSV, migrains, anxiety     Allergies:  Flagyl, macrobid        OBJECTIVE  Pull meds and vitals   /82 (BP Location: Left arm, Patient Position: Sitting, Cuff Size: Standard)   Wt 75 8 kg (167 lb 3 2 oz)   LMP 2019 (LMP Unknown)   BMI 30 58 kg/m²           Early OB Ultrasound Procedure Note: Transvaginal US     Technician: Study performed by the interpreting LORNE     Indications:  Early gestation, dating & viability     Procedure Details   The entire study was done at settings of 6 0 to 8 0 MHz  Gestational Sac: Present  Yolk sac: Present  Crown-rump length present but too small to measure  GS was 1 23 cm whic calculates to an estimated gestational age of  7weeks, 3days  Embryonic cardiac activity was visualized but unable to measure rate      Cul-de-sac: no fluid  Left ovary: not appreciated  Right ovary: not appreciated     Findings:  Stewart intrauterine pregnancy        ASSESSMENT  Early pregnancy at  5 weeks  3 days with a calculated RUDOLPH of 10/9/20  based on today's ultrasound    This is NOT consistent with LMP

## 2020-02-10 NOTE — ASSESSMENT & PLAN NOTE
Awilda Bowden is a 21year old G5   who presents for cramping x 2 weeks and reported a + UPT 2 weeks ago  States cramping feels like contractions-comes and goes-entire lower abdomen  Agreed to early ultrasound  Single IUP @ 5w3d based on GS measurement   + YS, +FHT observed but not measured  Aware we need to repeat U/S in 2 weeks to verify viability  By today's scan her RUDOLPH is 10/9/20 which is NOT consistent with LMP of  12/5/19  She reports a yeast infection-will use OTC monistat x 7 days  Also c/o breast tenderness and nausea  Encouraged to start PNV  Discussed avoiding teratogens  To notify us with any bleeding or pelvic pain  Verbalized understanding

## 2020-02-17 ENCOUNTER — TELEPHONE (OUTPATIENT)
Dept: FAMILY MEDICINE CLINIC | Facility: CLINIC | Age: 25
End: 2020-02-17

## 2020-02-17 ENCOUNTER — HOSPITAL ENCOUNTER (EMERGENCY)
Facility: HOSPITAL | Age: 25
Discharge: HOME | End: 2020-02-17
Attending: EMERGENCY MEDICINE | Admitting: EMERGENCY MEDICINE
Payer: COMMERCIAL

## 2020-02-17 VITALS
WEIGHT: 165 LBS | RESPIRATION RATE: 18 BRPM | DIASTOLIC BLOOD PRESSURE: 58 MMHG | HEART RATE: 72 BPM | SYSTOLIC BLOOD PRESSURE: 107 MMHG | OXYGEN SATURATION: 100 % | BODY MASS INDEX: 30.36 KG/M2 | HEIGHT: 62 IN | TEMPERATURE: 98.2 F

## 2020-02-17 DIAGNOSIS — Z34.91 FIRST TRIMESTER PREGNANCY: Primary | ICD-10-CM

## 2020-02-17 DIAGNOSIS — R19.7 NAUSEA VOMITING AND DIARRHEA: ICD-10-CM

## 2020-02-17 DIAGNOSIS — R10.9 ABDOMINAL PAIN, UNSPECIFIED ABDOMINAL LOCATION: Primary | ICD-10-CM

## 2020-02-17 DIAGNOSIS — R11.2 NAUSEA VOMITING AND DIARRHEA: ICD-10-CM

## 2020-02-17 LAB
ALBUMIN SERPL BCP-MCNC: 3.1 G/DL (ref 3.5–5)
ALP SERPL-CCNC: 50 U/L (ref 46–116)
ALT SERPL W P-5'-P-CCNC: 8 U/L (ref 12–78)
ANION GAP SERPL CALCULATED.3IONS-SCNC: 11 MMOL/L (ref 4–13)
AST SERPL W P-5'-P-CCNC: 19 U/L (ref 5–45)
BASOPHILS # BLD AUTO: 0.08 THOUSANDS/ΜL (ref 0–0.1)
BASOPHILS NFR BLD AUTO: 1 % (ref 0–1)
BILIRUB SERPL-MCNC: 0.2 MG/DL (ref 0.2–1)
BILIRUB UR QL STRIP: NEGATIVE
BUN SERPL-MCNC: 10 MG/DL (ref 5–25)
CALCIUM SERPL-MCNC: 8.5 MG/DL (ref 8.3–10.1)
CHLORIDE SERPL-SCNC: 104 MMOL/L (ref 100–108)
CLARITY UR: NORMAL
CO2 SERPL-SCNC: 24 MMOL/L (ref 21–32)
COLOR UR: YELLOW
CREAT SERPL-MCNC: 0.62 MG/DL (ref 0.6–1.3)
EOSINOPHIL # BLD AUTO: 0.22 THOUSAND/ΜL (ref 0–0.61)
EOSINOPHIL NFR BLD AUTO: 3 % (ref 0–6)
ERYTHROCYTE [DISTWIDTH] IN BLOOD BY AUTOMATED COUNT: 16.2 % (ref 11.6–15.1)
GFR SERPL CREATININE-BSD FRML MDRD: 145 ML/MIN/1.73SQ M
GLUCOSE SERPL-MCNC: 67 MG/DL (ref 65–140)
GLUCOSE UR STRIP-MCNC: NEGATIVE MG/DL
HCT VFR BLD AUTO: 34.3 % (ref 34.8–46.1)
HGB BLD-MCNC: 9.9 G/DL (ref 11.5–15.4)
HGB UR QL STRIP.AUTO: NEGATIVE
IMM GRANULOCYTES # BLD AUTO: 0.02 THOUSAND/UL (ref 0–0.2)
IMM GRANULOCYTES NFR BLD AUTO: 0 % (ref 0–2)
KETONES UR STRIP-MCNC: NEGATIVE MG/DL
LEUKOCYTE ESTERASE UR QL STRIP: NEGATIVE
LIPASE SERPL-CCNC: 57 U/L (ref 73–393)
LYMPHOCYTES # BLD AUTO: 2.39 THOUSANDS/ΜL (ref 0.6–4.47)
LYMPHOCYTES NFR BLD AUTO: 32 % (ref 14–44)
MCH RBC QN AUTO: 23.2 PG (ref 26.8–34.3)
MCHC RBC AUTO-ENTMCNC: 28.9 G/DL (ref 31.4–37.4)
MCV RBC AUTO: 81 FL (ref 82–98)
MONOCYTES # BLD AUTO: 0.87 THOUSAND/ΜL (ref 0.17–1.22)
MONOCYTES NFR BLD AUTO: 12 % (ref 4–12)
NEUTROPHILS # BLD AUTO: 3.85 THOUSANDS/ΜL (ref 1.85–7.62)
NEUTS SEG NFR BLD AUTO: 52 % (ref 43–75)
NITRITE UR QL STRIP: NEGATIVE
NRBC BLD AUTO-RTO: 0 /100 WBCS
PH UR STRIP.AUTO: 6.5 [PH]
PLATELET # BLD AUTO: 303 THOUSANDS/UL (ref 149–390)
PMV BLD AUTO: 10.7 FL (ref 8.9–12.7)
POTASSIUM SERPL-SCNC: 4 MMOL/L (ref 3.5–5.3)
PROT SERPL-MCNC: 7.4 G/DL (ref 6.4–8.2)
PROT UR STRIP-MCNC: NEGATIVE MG/DL
RBC # BLD AUTO: 4.26 MILLION/UL (ref 3.81–5.12)
SODIUM SERPL-SCNC: 139 MMOL/L (ref 136–145)
SP GR UR STRIP.AUTO: 1.02 (ref 1–1.03)
UROBILINOGEN UR QL STRIP.AUTO: 0.2 E.U./DL
WBC # BLD AUTO: 7.43 THOUSAND/UL (ref 4.31–10.16)

## 2020-02-17 PROCEDURE — 96361 HYDRATE IV INFUSION ADD-ON: CPT

## 2020-02-17 PROCEDURE — 87086 URINE CULTURE/COLONY COUNT: CPT | Performed by: EMERGENCY MEDICINE

## 2020-02-17 PROCEDURE — 81003 URINALYSIS AUTO W/O SCOPE: CPT | Performed by: EMERGENCY MEDICINE

## 2020-02-17 PROCEDURE — 83690 ASSAY OF LIPASE: CPT | Performed by: EMERGENCY MEDICINE

## 2020-02-17 PROCEDURE — 99284 EMERGENCY DEPT VISIT MOD MDM: CPT

## 2020-02-17 PROCEDURE — 87147 CULTURE TYPE IMMUNOLOGIC: CPT | Performed by: EMERGENCY MEDICINE

## 2020-02-17 PROCEDURE — 85025 COMPLETE CBC W/AUTO DIFF WBC: CPT | Performed by: EMERGENCY MEDICINE

## 2020-02-17 PROCEDURE — 80053 COMPREHEN METABOLIC PANEL: CPT | Performed by: EMERGENCY MEDICINE

## 2020-02-17 PROCEDURE — 36415 COLL VENOUS BLD VENIPUNCTURE: CPT | Performed by: EMERGENCY MEDICINE

## 2020-02-17 PROCEDURE — 96374 THER/PROPH/DIAG INJ IV PUSH: CPT

## 2020-02-17 PROCEDURE — 99284 EMERGENCY DEPT VISIT MOD MDM: CPT | Performed by: EMERGENCY MEDICINE

## 2020-02-17 RX ORDER — ONDANSETRON 2 MG/ML
4 INJECTION INTRAMUSCULAR; INTRAVENOUS ONCE
Status: COMPLETED | OUTPATIENT
Start: 2020-02-17 | End: 2020-02-17

## 2020-02-17 RX ORDER — DICYCLOMINE HCL 20 MG
20 TABLET ORAL EVERY 6 HOURS
Qty: 20 TABLET | Refills: 0 | Status: SHIPPED | OUTPATIENT
Start: 2020-02-17 | End: 2020-03-02 | Stop reason: ALTCHOICE

## 2020-02-17 RX ORDER — ONDANSETRON 4 MG/1
4 TABLET, ORALLY DISINTEGRATING ORAL EVERY 8 HOURS PRN
Qty: 12 TABLET | Refills: 0 | Status: SHIPPED | OUTPATIENT
Start: 2020-02-17 | End: 2020-03-02 | Stop reason: ALTCHOICE

## 2020-02-17 RX ADMIN — SODIUM CHLORIDE 1000 ML: 0.9 INJECTION, SOLUTION INTRAVENOUS at 12:23

## 2020-02-17 RX ADMIN — ONDANSETRON 4 MG: 2 INJECTION INTRAMUSCULAR; INTRAVENOUS at 12:27

## 2020-02-17 NOTE — TELEPHONE ENCOUNTER
Pt called and stated that she is in the ER again for abdominal pain and when she was here last time you told that if it continued you would give her an order for GI  She is requesting one  Please call when done

## 2020-02-17 NOTE — ED PROVIDER NOTES
History  Chief Complaint   Patient presents with    Abdominal Pain     pt LUQ abd pain/burning sensation onset last night, same symptoms in December h/o colitis, pt notes she is 7 weeks preg  RUDOLPH 10/6/2020         History provided by:  Patient  Abdominal Pain   Pain location:  LUQ  Pain quality: aching    Pain radiates to:  Does not radiate  Pain severity:  Mild  Onset quality:  Gradual  Duration:  1 day  Timing:  Constant  Progression:  Unchanged  Chronicity:  Recurrent  Context: not sick contacts    Relieved by:  Nothing  Worsened by:  Nothing  Ineffective treatments:  None tried  Associated symptoms: diarrhea (3 episodes last 2 days), nausea and vomiting    Associated symptoms: no anorexia, no belching, no chest pain, no dysuria, no fatigue, no fever, no vaginal bleeding and no vaginal discharge        None       Past Medical History:   Diagnosis Date    Anemia complicating pregnancy     Anxiety     Asthma     Cerebral concussion 3/17/2017    Group B Streptococcus carrier, antepartum 2017    Head injury     Herpes     Migraine     Postpartum depression     Varicella     vaccine       Past Surgical History:   Procedure Laterality Date    APPENDECTOMY      INDUCED       [place]  DELIVERY ONLY N/A 2017    Procedure:  SECTION (); Surgeon: Brittaney Barton DO;  [facility]: [facility] ;  Service: Obstetrics       Family History   Problem Relation Age of Onset    Asthma Mother     Depression Mother    Junius Magallon Migraines Mother     Diabetes type II Mother     Other Mother         vertigo    Diabetes Maternal Aunt     Hypertension Maternal Aunt     Diabetes Maternal Grandmother     Hypertension Maternal Grandmother     Breast cancer Maternal Grandmother     Migraines Father     Heart murmur Brother     Lung cancer Paternal Grandmother     Stroke Paternal Grandmother      I have reviewed and agree with the history as documented      Social History     Tobacco Use    Smoking status: Former Smoker     Last attempt to quit: 2014     Years since quittin 1    Smokeless tobacco: Never Used    Tobacco comment: Never a smoker per [employer]    Substance Use Topics    Alcohol use: No    Drug use: No       Review of Systems   Constitutional: Negative for fatigue and fever  Cardiovascular: Negative for chest pain  Gastrointestinal: Positive for abdominal pain, diarrhea (3 episodes last 2 days), nausea and vomiting  Negative for anorexia  Genitourinary: Negative for dysuria, vaginal bleeding and vaginal discharge  All other systems reviewed and are negative  Physical Exam  Physical Exam   Constitutional: She is oriented to person, place, and time  She appears well-developed and well-nourished  No distress  HENT:   Head: Normocephalic and atraumatic  Nose: Nose normal    Mouth/Throat: Oropharynx is clear and moist    Eyes: Conjunctivae and EOM are normal    Neck: Normal range of motion  Neck supple  Cardiovascular: Normal rate, regular rhythm and normal heart sounds  Pulmonary/Chest: Effort normal and breath sounds normal  No respiratory distress  Abdominal: Soft  There is no tenderness  Musculoskeletal: Normal range of motion  Neurological: She is alert and oriented to person, place, and time  Skin: Skin is warm and dry  She is not diaphoretic  Psychiatric: She has a normal mood and affect  Nursing note and vitals reviewed        Vital Signs  ED Triage Vitals [20 1056]   Temperature Pulse Respirations Blood Pressure SpO2   98 2 °F (36 8 °C) 83 18 117/58 100 %      Temp Source Heart Rate Source Patient Position - Orthostatic VS BP Location FiO2 (%)   Oral Monitor Sitting Right arm --      Pain Score       --           Vitals:    20 1056 20 1228   BP: 117/58 107/58   Pulse: 83 72   Patient Position - Orthostatic VS: Sitting          Visual Acuity      ED Medications  Medications   sodium chloride 0 9 % bolus 1,000 mL (1,000 mL Intravenous New Bag 2/17/20 1223)   ondansetron (ZOFRAN) injection 4 mg (4 mg Intravenous Given 2/17/20 1227)       Diagnostic Studies  Results Reviewed     Procedure Component Value Units Date/Time    Comprehensive metabolic panel [[id number]]  (Abnormal) Collected:  02/17/20 1221    Lab Status:  Final result Specimen:  Blood from Arm, Left Updated:  02/17/20 1311     Sodium 139 mmol/L      Potassium 4 0 mmol/L      Chloride 104 mmol/L      CO2 24 mmol/L      ANION GAP 11 mmol/L      BUN 10 mg/dL      Creatinine 0 62 mg/dL      Glucose 67 mg/dL      Calcium 8 5 mg/dL      AST 19 U/L      ALT 8 U/L      Alkaline Phosphatase 50 U/L      Total Protein 7 4 g/dL      Albumin 3 1 g/dL      Total Bilirubin 0 20 mg/dL      eGFR 145 ml/min/1 73sq m     Narrative:       National Kidney Disease Foundation guidelines for Chronic Kidney Disease (CKD):     Stage 1 with normal or high GFR (GFR > 90 mL/min/1 73 square meters)    Stage 2 Mild CKD (GFR = 60-89 mL/min/1 73 square meters)    Stage 3A Moderate CKD (GFR = 45-59 mL/min/1 73 square meters)    Stage 3B Moderate CKD (GFR = 30-44 mL/min/1 73 square meters)    Stage 4 Severe CKD (GFR = 15-29 mL/min/1 73 square meters)    Stage 5 End Stage CKD (GFR <15 mL/min/1 73 square meters)  Note: GFR calculation is accurate only with a steady state creatinine    Lipase [[id number]]  (Abnormal) Collected:  02/17/20 1221    Lab Status:  Final result Specimen:  Blood from Arm, Left Updated:  02/17/20 1309     Lipase 57 u/L     CBC and differential [[id number]]  (Abnormal) Collected:  02/17/20 1221    Lab Status:  Final result Specimen:  Blood from Arm, Left Updated:  02/17/20 1228     WBC 7 43 Thousand/uL      RBC 4 26 Million/uL      Hemoglobin 9 9 g/dL      Hematocrit 34 3 %      MCV 81 fL      MCH 23 2 pg      MCHC 28 9 g/dL      RDW 16 2 %      MPV 10 7 fL      Platelets 093 Thousands/uL      nRBC 0 /100 WBCs      Neutrophils Relative 52 %      Immat GRANS % 0 %      Lymphocytes Relative 32 %      Monocytes Relative 12 %      Eosinophils Relative 3 %      Basophils Relative 1 %      Neutrophils Absolute 3 85 Thousands/µL      Immature Grans Absolute 0 02 Thousand/uL      Lymphocytes Absolute 2 39 Thousands/µL      Monocytes Absolute 0 87 Thousand/µL      Eosinophils Absolute 0 22 Thousand/µL      Basophils Absolute 0 08 Thousands/µL     UA w Reflex to Microscopic w Reflex to Culture [[id number]] Collected:  02/17/20 1214    Lab Status:  Final result Specimen:  Urine, Clean Catch Updated:  02/17/20 1227     Color, UA Yellow     Clarity, UA Slightly Cloudy     Specific Gravity, UA 1 025     pH, UA 6 5     Leukocytes, UA Negative     Nitrite, UA Negative     Protein, UA Negative mg/dl      Glucose, UA Negative mg/dl      Ketones, UA Negative mg/dl      Urobilinogen, UA 0 2 E U /dl      Bilirubin, UA Negative     Blood, UA Negative     URINE COMMENT --    Urine culture [[id number]] Collected:  02/17/20 1214    Lab Status: In process Specimen:  Urine, Clean Catch Updated:  02/17/20 1227                 No orders to display              Procedures  Procedures         ED Course                               MDM  Number of Diagnoses or Management Options  First trimester pregnancy: new and does not require workup  Nausea vomiting and diarrhea: new and requires workup  Diagnosis management comments: [de-identified] year female coming in with complaint of nausea vomiting and diarrhea with some left upper quadrant abdominal pain for the past day  Patient states in the past she has history of having a colitis where she was briefly put on antibiotics and this felt similar  Patient is about 7 weeks pregnant based on ultrasound and dates  She does not have any complaint related to the pregnancy other than she has been having fatigue and nausea with intermittent episodes of vomiting    However patient does note that she does not have plans to keep this pregnancy and is scheduled to have a termination tomorrow and that she was concerned that her symptoms with the diarrhea may preclude the termination  She has not had any fever  She does not have any blood in the vomit or diarrhea  The diarrhea was 3 loose stools  Upon reviewing her old medical records she had a CT scan done at [facility] and was otherwise unremarkable except showed perhaps a mild enteritis  So will check labs for dehydration and patient has history of anemia so will check her labs will give her Zofran for her nausea and hydrate her with fluids she likely has first-trimester pregnancy symptoms along with a possible mild gastroenteritis  Her abdomen is soft nontender and she has no fevers so do not think CT scan would be indicated at this time especially given when she had similar symptoms her CT last time showed mild enteritis  Amount and/or Complexity of Data Reviewed  Clinical lab tests: ordered and reviewed  Review and summarize past medical records: yes    Risk of Complications, Morbidity, and/or Mortality  Presenting problems: high          Disposition  Final diagnoses:   First trimester pregnancy   Nausea vomiting and diarrhea     Time reflects when diagnosis was documented in both MDM as applicable and the Disposition within this note     Time User Action Codes Description Comment    2/17/2020  1:20 PM Edgar Reinoso Add [Z34 91] First trimester pregnancy     2/17/2020  1:20 PM Carlos Sharp, [address] [R11 2,  R19 7] Nausea vomiting and diarrhea       ED Disposition     ED Disposition Condition Date/Time Comment    Discharge Stable Mon Feb 17, 2020  1:20 PM Katherin Iverson discharge to home/self care              Follow-up Information     Follow up With Specialties Details Why Contact Info Additional [address] [facility] Emergency Department Emergency Medicine Go to  If symptoms worsen [address] [facility] [address] [place], [place], [address]          Patient's Medications   Discharge Prescriptions    DICYCLOMINE (BENTYL) 20 MG TABLET    Take 1 tablet (20 mg total) by mouth every 6 (six) hours for 20 doses       Start Date: 2/17/2020 End Date: 2/22/2020       Order Dose: 20 mg       Quantity: 20 tablet    Refills: 0    ONDANSETRON (ZOFRAN-ODT) 4 MG DISINTEGRATING TABLET    Take 1 tablet (4 mg total) by mouth every 8 (eight) hours as needed for nausea or vomiting       Start Date: 2/17/2020 End Date: 3/18/2020       Order Dose: 4 mg       Quantity: 12 tablet    Refills: 0     No discharge procedures on file      PDMP Review     None          ED Provider  Electronically Signed by           Radha Silver MD  02/17/20 5442

## 2020-02-19 LAB — BACTERIA UR CULT: ABNORMAL

## 2020-02-24 PROBLEM — Z01.89 ENCOUNTER FOR IMAGING TO ASSESS MYOCARDIAL VIABILITY: Status: ACTIVE | Noted: 2020-02-24

## 2020-02-24 PROBLEM — N91.1 SECONDARY AMENORRHEA: Status: ACTIVE | Noted: 2020-02-24

## 2020-02-24 PROBLEM — N91.1 SECONDARY AMENORRHEA: Status: RESOLVED | Noted: 2020-02-24 | Resolved: 2020-02-24

## 2020-02-27 ENCOUNTER — TELEPHONE (OUTPATIENT)
Dept: OBGYN CLINIC | Facility: CLINIC | Age: 25
End: 2020-02-27

## 2020-02-27 NOTE — TELEPHONE ENCOUNTER
Returned Pt's call today  Pt informed that her reported symptoms were addressed by Elliot   Pt advised to schedule an appointment with provider due to it has been 4 months since Pt was last given medication per Elliot Montezch' recommendation  Pt scheduled for an appointment on 3/2/20 @ 2:40 pm with CHRISTINE Franz ([address] [place] [address]) per Pt's request   Pt instructed to arrive by 2:25 pm   Reiterated to Pt that if her symptoms worsen before scheduled appointment to report to nearest "Urgent Care"/"[place]" facility for evaluation of symptoms

## 2020-02-27 NOTE — TELEPHONE ENCOUNTER
Dear Maria De Jesus Montero:    Pt called office c/o recurrent BV symptoms  Pt saw you on 2/10/20 (OVS)  Pt was previously prescribed Cleocin on 10/30/19 and Flagyl on 7/16/19 for BV symptoms  Pt states she is unable to determine if she has "vaginal discharge" due to her period being on at this time  Pt further states she has been experiencing vaginal symptoms for approximately 1 week now  Pt states "she is no longer pregnant "  Pt reports vulvar itching accompanied by foul fishy odor  Pt denies vaginal itching, vaginal foaminess, and urinary symptoms at this time  Pt further denies being sexually active for approximately 1 month now  Pt states she is allergic to Flagyl and Macrobid with regards to medication allergies  Should Pt be prescribed medication and/or scheduled for an appointment with a provider? Please advise  Thank you!     Maxi Maria MA

## 2020-03-02 ENCOUNTER — OFFICE VISIT (OUTPATIENT)
Dept: OBGYN CLINIC | Facility: MEDICAL CENTER | Age: 25
End: 2020-03-02
Payer: COMMERCIAL

## 2020-03-02 ENCOUNTER — TELEPHONE (OUTPATIENT)
Dept: OBGYN CLINIC | Facility: MEDICAL CENTER | Age: 25
End: 2020-03-02

## 2020-03-02 VITALS — WEIGHT: 167 LBS | DIASTOLIC BLOOD PRESSURE: 62 MMHG | SYSTOLIC BLOOD PRESSURE: 110 MMHG | BODY MASS INDEX: 30.54 KG/M2

## 2020-03-02 DIAGNOSIS — B96.89 BACTERIAL VAGINOSIS: Primary | ICD-10-CM

## 2020-03-02 DIAGNOSIS — N76.0 BACTERIAL VAGINOSIS: Primary | ICD-10-CM

## 2020-03-02 PROBLEM — Z3A.01 LESS THAN 8 WEEKS GESTATION OF PREGNANCY: Status: RESOLVED | Noted: 2020-01-28 | Resolved: 2020-03-02

## 2020-03-02 PROBLEM — N91.2 AMENORRHEA: Status: RESOLVED | Noted: 2020-01-28 | Resolved: 2020-03-02

## 2020-03-02 PROBLEM — Z01.89 ENCOUNTER FOR IMAGING TO ASSESS MYOCARDIAL VIABILITY: Status: RESOLVED | Noted: 2020-02-24 | Resolved: 2020-03-02

## 2020-03-02 PROBLEM — J02.9 PHARYNGITIS: Status: RESOLVED | Noted: 2020-01-28 | Resolved: 2020-03-02

## 2020-03-02 LAB
BV WHIFF TEST VAG QL: ABNORMAL
CLUE CELLS SPEC QL WET PREP: ABNORMAL
PH SMN: 5.5 [PH]
T VAGINALIS VAG QL WET PREP: ABNORMAL
YEAST VAG QL WET PREP: ABNORMAL

## 2020-03-02 PROCEDURE — 99213 OFFICE O/P EST LOW 20 MIN: CPT | Performed by: PHYSICIAN ASSISTANT

## 2020-03-02 PROCEDURE — 1036F TOBACCO NON-USER: CPT | Performed by: PHYSICIAN ASSISTANT

## 2020-03-02 PROCEDURE — 87210 SMEAR WET MOUNT SALINE/INK: CPT | Performed by: PHYSICIAN ASSISTANT

## 2020-03-02 RX ORDER — CLINDAMYCIN HYDROCHLORIDE 300 MG/1
300 CAPSULE ORAL 3 TIMES DAILY
Qty: 21 CAPSULE | Refills: 0 | Status: SHIPPED | OUTPATIENT
Start: 2020-03-02 | End: 2020-03-09

## 2020-03-02 NOTE — TELEPHONE ENCOUNTER
[employer] labeled and will be brought to [facility]  Pt can be scheduled during menses or after 2 weeks of abstinence for insertion

## 2020-03-02 NOTE — PROGRESS NOTES
Eugenia Iverson  1995    S:  21 y o  female here for a problem visit  She had a 7 week VIP in [place] two weeks ago  She says her cramping is much better now, and her bleeding is lessening  She says she became pregnant having sex one time without a condom  This is her second VIP (first around age [age]) and says that this was much more crampy than the first time around  We had a long discussion about contraception  In the past she had a ParaGard that caused significant cramping and heavy bleeding  She then was placed on Depo which caused weight gain  We discussed trying a Chalmer Bharat IUD and she is interested in trying this  She notes fishy vaginal odor that she says she can smell through her clothing - this is very embarrassing to her  She is taking a probiotic daily  She has not followed up with Dr Chandler Burton but knows that she needs to make an appointment       Past Medical History:   Diagnosis Date    Anemia complicating pregnancy 07/8/6577    Anxiety     Asthma     Cerebral concussion 3/17/2017    Group B Streptococcus carrier, antepartum 12/1/2017    Head injury     Herpes     Migraine     Postpartum depression     Secondary amenorrhea 2/24/2020    Varicella     vaccine     Family History   Problem Relation Age of Onset    Asthma Mother     Depression Mother     Migraines Mother     Diabetes type II Mother     Other Mother         vertigo    Diabetes Maternal Aunt     Hypertension Maternal Aunt     Diabetes Maternal Grandmother     Hypertension Maternal Grandmother     Breast cancer Maternal Grandmother     Migraines Father     Heart murmur Brother     Lung cancer Paternal Grandmother     Stroke Paternal Grandmother      Social History     Socioeconomic History    Marital status: Single     Spouse name: None    Number of children: None    Years of education: None    Highest education level: None   Occupational History    None   Social Needs    Financial resource strain: None    Food insecurity:     Worry: None     Inability: None    Transportation needs:     Medical: None     Non-medical: None   Tobacco Use    Smoking status: Former Smoker     Last attempt to quit: 2014     Years since quittin 1    Smokeless tobacco: Never Used    Tobacco comment: Never a smoker per [employer]    Substance and Sexual Activity    Alcohol use: No    Drug use: No    Sexual activity: Yes     Partners: Male   Lifestyle    Physical activity:     Days per week: None     Minutes per session: None    Stress: None   Relationships    Social connections:     Talks on phone: None     Gets together: None     Attends Restorationism service: None     Active member of club or organization: None     Attends meetings of clubs or organizations: None     Relationship status: None    Intimate partner violence:     Fear of current or ex partner: None     Emotionally abused: None     Physically abused: None     Forced sexual activity: None   Other Topics Concern    None   Social History Narrative    Hx of recent travel        Review of Systems   Respiratory: Negative  Cardiovascular: Negative  Gastrointestinal: Negative for constipation and diarrhea  Genitourinary: Negative for difficulty urinating, pelvic pain, vaginal bleeding, vaginal discharge, itching or odor  O:  /62 (BP Location: Right arm, Patient Position: Sitting, Cuff Size: Standard)   Wt 75 8 kg (167 lb)   LMP 2019 (LMP Unknown)   BMI 30 54 kg/m²   She appears well and is in no distress  Abdomen is soft and nontender  External genitals are normal without lesions or rashes  Vagina has moderate dark red bleeding present  Cervix is normal, no lesions or discharge  Uterus is nontender, no masses  Adnexa are nontender, no pelvic masses appreciated    Wet mount reveals no yeast, no trich, many clue cells, pH 5 5, pos whiff     A/P: Bacterial vaginosis   Pt with nausea/vomiting on Flagyl in the past  Will treat with Clindamycin 300mg po BID x 7 days  Call with any worsening or if no better in two weeks  Contraception  RTO for Kyleena insertion

## 2020-03-03 ENCOUNTER — OFFICE VISIT (OUTPATIENT)
Dept: GASTROENTEROLOGY | Facility: CLINIC | Age: 25
End: 2020-03-03
Payer: COMMERCIAL

## 2020-03-03 VITALS
WEIGHT: 167 LBS | DIASTOLIC BLOOD PRESSURE: 60 MMHG | SYSTOLIC BLOOD PRESSURE: 120 MMHG | BODY MASS INDEX: 30.54 KG/M2 | HEART RATE: 87 BPM

## 2020-03-03 DIAGNOSIS — R10.9 ABDOMINAL PAIN, UNSPECIFIED ABDOMINAL LOCATION: ICD-10-CM

## 2020-03-03 DIAGNOSIS — R10.13 EPIGASTRIC PAIN: Primary | ICD-10-CM

## 2020-03-03 DIAGNOSIS — R10.12 LEFT UPPER QUADRANT PAIN: ICD-10-CM

## 2020-03-03 PROCEDURE — 99244 OFF/OP CNSLTJ NEW/EST MOD 40: CPT | Performed by: INTERNAL MEDICINE

## 2020-03-03 NOTE — PROGRESS NOTES
[facility]    Dear Dr Andrew Tomas     I had the pleasure of seeing your patient Bebo Armijo in the office today and I thank you for this kind referral        Chief Complaint:  Abdominal pain      HPI:  Bebo Armijo is a 21 y o  female who presents with abdominal pain  According to the patient she has a history of this going back to the age of 11  At that time she developed severe epigastric and left upper quadrant pain  She was seen in an emergency room in told she probably had an ulcer although she does not recall any testing being done  She was placed on Prilosec and that seemed to help  The patient has suffered from constipation since childhood  Last December she began again having a left upper quadrant and midepigastric nonradiating pain  However the patient had been drinking a lot and also taking a lot of Excedrin for headaches  She noticed that the pain was so bad she we get palpitations from it  She went to the hospital and underwent a CT scan which showed a possible enteritis  She was placed on antibiotics but these were stopped  More recently the patient had further episode with epigastric and left upper quadrant pain  There were no nocturnal symptoms  She notes that she still takes Excedrin  She has not had any melena or hematochezia  No nausea or vomiting  No early satiety  No other definitive exacerbating or remitting factors  No relation to any particular type of food  No exertional symptomatology  Lipase recently done was 57  Albumin was 3 1  She has a chronic mild anemia with last hemoglobin of 9 9 and hematocrit of 34 3  Ultrasound was unremarkable  There is no other contributory history  Patient underwent a recent surgical          Review of Systems:   Constitutional: No fever or chills, feels well, no tiredness, no recent weight gain or weight loss     HENT: No complaints of earache, no hearing loss, no nosebleeds, no nasal discharge, no sore throat, no hoarseness  Eyes: No complaints of eye pain, no red eyes, no discharge from eyes, no itchy eyes  Cardiovascular: No complaints of slow heart rate, no fast heart rate, no chest pain, no palpitations, no leg claudication, no lower extremity edema  Respiratory: No complaints of shortness of breath, no wheezing, no cough, no SOB on exertion, no orthopnea  Gastrointestinal: As noted in HPI  Genitourinary: No complaints of dysuria, no incontinence, no hesitancy, no nocturia  Musculoskeletal: No complaints of arthralgia, no myalgias, no joint swelling or stiffness, no limb pain or swelling  Neurological: No complaints of headache, no confusion, no convulsions, no numbness or tingling, no dizziness or fainting, no limb weakness, no difficulty walking  Skin: No complaints of skin rash or skin lesions, no itching, no skin wound, no dry skin  Hematological/Lymphatic: No complaints of swollen glands, does not bleed easy  Allergic/Immunologic: No immunocompromised state  Endocrine:  No complaints of polyuria, no polydipsia  Psychiatric/Behavioral: is not suicidal, no sleep disturbances, no anxiety or depression, no change in personality, no emotional problems  Historical Information   Past Medical History:   Diagnosis Date    Anemia complicating pregnancy 2382    Anxiety     Asthma     Cerebral concussion 3/17/2017    Group B Streptococcus carrier, antepartum 2017    Head injury     Herpes     Migraine     Postpartum depression     Secondary amenorrhea 2020    Varicella     vaccine     Past Surgical History:   Procedure Laterality Date    APPENDECTOMY      INDUCED   2012 2020    X 2    [place]  DELIVERY ONLY N/A 2017    Procedure:  SECTION ();   Surgeon: Angelique Adam DO;  Location: [facility] ;  Service: Obstetrics     Social History   Social History     Substance and Sexual Activity   Alcohol Use No     Social History     Substance and Sexual Activity   Drug Use No     Social History     Tobacco Use   Smoking Status Former Smoker    Last attempt to quit:     Years since quittin 1   Smokeless Tobacco Never Used   Tobacco Comment    Never a smoker per [employer]      Family History   Problem Relation Age of Onset    Asthma Mother     Depression Mother    Angel Luis Flower Migraines Mother     Diabetes type II Mother     Other Mother         vertigo    Diabetes Maternal Aunt     Hypertension Maternal Aunt     Diabetes Maternal Grandmother     Hypertension Maternal Grandmother     Breast cancer Maternal Grandmother     Migraines Father     Heart murmur Brother     Lung cancer Paternal Grandmother     Stroke Paternal Grandmother          Current Medications: has a current medication list which includes the following prescription(s): clindamycin  Vital Signs: /60   Pulse 87   Wt 75 8 kg (167 lb)   LMP 2019 (LMP Unknown)   BMI 30 54 kg/m²     Physical Exam:   Constitutional  General Appearance: No acute distress, well appearing and well nourished  Head  Normocephalic  Eyes  Conjunctivae and lids: No swelling, erythema, or discharge  Pupils and irises: Equal, round and reactive to light  Ears, Nose, Mouth, and Throat  External inspection of ears and nose: Normal  Nasal mucosa, septum and turbinates: Normal without edema or erythema/   Oropharynx: Normal with no erythema, edema, exudate or lesions  Neck  Normal range of motion  Neck supple  Cardiovascular  Auscultation of the heart: Normal rate and rhythm, normal S1 and S2 without murmurs  Examination of the extremities for edema and/or varicosities: Normal  Pulmonary/Chest  Respiratory effort: No increased work of breathing or signs of respiratory distress  Auscultation of lungs: Clear to auscultation, equal breath sounds bilaterally, no wheezes, rales, no rhonchi  Abdomen  Abdomen: Non-tender, no masses  Liver and spleen: No hepatomegaly or splenomegaly  Musculoskeletal  Gait and station: normal   Digits and Nails: normal without clubbing or cyanosis  Inspection/palpation of joints, bones, and muscles: Normal  Neurological  No nystagmus or asterixis  Skin  Skin and subcutaneous tissue: Normal without rashes or lesions  Lymphatic  Palpation of the lymph nodes in neck: No lymphadenopathy  Psychiatric  Orientation to person, place and time: Normal   Mood and affect: Normal          Labs:   Lab Results   Component Value Date    ALT 8 (L) 02/17/2020    AST 19 02/17/2020    BUN 10 02/17/2020    CALCIUM 8 5 02/17/2020     02/17/2020    CO2 24 02/17/2020    CREATININE 0 62 02/17/2020    HCT 34 3 (L) 02/17/2020    HGB 9 9 (L) 02/17/2020     02/17/2020    K 4 0 02/17/2020    WBC 7 43 02/17/2020         X-Rays & Procedures:   No orders to display         ______________________________________________________________________      Assessment & Plan:      Diagnoses and all orders for this visit:    Epigastric pain    Abdominal pain, unspecified abdominal location  -     Ambulatory referral to Gastroenterology    Left upper quadrant pain        I have taken liberty of scheduling the patient for an EGD  This certainly sounds like it might be at least gastritis  Advised that she begin over-the-counter Pepcid 20 mg p o  B i d  Since she had some good response to Prilosec in past   I Will be happy to inform you of her results any further recommendations ,I   Would like to thank you for participate              With warmest regards,    Vincent Verma MD, [facility]

## 2020-03-03 NOTE — H&P (VIEW-ONLY)
[facility]    Dear Dr Rudi Amaro     I had the pleasure of seeing your patient Gilberto Ackerman in the office today and I thank you for this kind referral        Chief Complaint:  Abdominal pain      HPI:  Gilberto Ackerman is a 21 y o  female who presents with abdominal pain  According to the patient she has a history of this going back to the age of 11  At that time she developed severe epigastric and left upper quadrant pain  She was seen in an emergency room in told she probably had an ulcer although she does not recall any testing being done  She was placed on Prilosec and that seemed to help  The patient has suffered from constipation since childhood  Last December she began again having a left upper quadrant and midepigastric nonradiating pain  However the patient had been drinking a lot and also taking a lot of Excedrin for headaches  She noticed that the pain was so bad she we get palpitations from it  She went to the hospital and underwent a CT scan which showed a possible enteritis  She was placed on antibiotics but these were stopped  More recently the patient had further episode with epigastric and left upper quadrant pain  There were no nocturnal symptoms  She notes that she still takes Excedrin  She has not had any melena or hematochezia  No nausea or vomiting  No early satiety  No other definitive exacerbating or remitting factors  No relation to any particular type of food  No exertional symptomatology  Lipase recently done was 57  Albumin was 3 1  She has a chronic mild anemia with last hemoglobin of 9 9 and hematocrit of 34 3  Ultrasound was unremarkable  There is no other contributory history  Patient underwent a recent surgical          Review of Systems:   Constitutional: No fever or chills, feels well, no tiredness, no recent weight gain or weight loss     HENT: No complaints of earache, no hearing loss, no nosebleeds, no nasal discharge, no sore throat, no hoarseness  Eyes: No complaints of eye pain, no red eyes, no discharge from eyes, no itchy eyes  Cardiovascular: No complaints of slow heart rate, no fast heart rate, no chest pain, no palpitations, no leg claudication, no lower extremity edema  Respiratory: No complaints of shortness of breath, no wheezing, no cough, no SOB on exertion, no orthopnea  Gastrointestinal: As noted in HPI  Genitourinary: No complaints of dysuria, no incontinence, no hesitancy, no nocturia  Musculoskeletal: No complaints of arthralgia, no myalgias, no joint swelling or stiffness, no limb pain or swelling  Neurological: No complaints of headache, no confusion, no convulsions, no numbness or tingling, no dizziness or fainting, no limb weakness, no difficulty walking  Skin: No complaints of skin rash or skin lesions, no itching, no skin wound, no dry skin  Hematological/Lymphatic: No complaints of swollen glands, does not bleed easy  Allergic/Immunologic: No immunocompromised state  Endocrine:  No complaints of polyuria, no polydipsia  Psychiatric/Behavioral: is not suicidal, no sleep disturbances, no anxiety or depression, no change in personality, no emotional problems  Historical Information   Past Medical History:   Diagnosis Date    Anemia complicating pregnancy     Anxiety     Asthma     Cerebral concussion 3/17/2017    Group B Streptococcus carrier, antepartum 2017    Head injury     Herpes     Migraine     Postpartum depression     Secondary amenorrhea 2020    Varicella     vaccine     Past Surgical History:   Procedure Laterality Date    APPENDECTOMY      INDUCED   2012 2020    X 2    [place]  DELIVERY ONLY N/A 2017    Procedure:  SECTION ();   Surgeon: Cynthia Mike DO;  Location: [facility] ;  Service: Obstetrics     Social History   Social History     Substance and Sexual Activity   Alcohol Use No     Social History     Substance and Sexual Activity   Drug Use No     Social History     Tobacco Use   Smoking Status Former Smoker    Last attempt to quit:     Years since quittin 1   Smokeless Tobacco Never Used   Tobacco Comment    Never a smoker per [employer]      Family History   Problem Relation Age of Onset    Asthma Mother     Depression Mother    Marah Seller Migraines Mother     Diabetes type II Mother     Other Mother         vertigo    Diabetes Maternal Aunt     Hypertension Maternal Aunt     Diabetes Maternal Grandmother     Hypertension Maternal Grandmother     Breast cancer Maternal Grandmother     Migraines Father     Heart murmur Brother     Lung cancer Paternal Grandmother     Stroke Paternal Grandmother          Current Medications: has a current medication list which includes the following prescription(s): clindamycin  Vital Signs: /60   Pulse 87   Wt 75 8 kg (167 lb)   LMP 2019 (LMP Unknown)   BMI 30 54 kg/m²     Physical Exam:   Constitutional  General Appearance: No acute distress, well appearing and well nourished  Head  Normocephalic  Eyes  Conjunctivae and lids: No swelling, erythema, or discharge  Pupils and irises: Equal, round and reactive to light  Ears, Nose, Mouth, and Throat  External inspection of ears and nose: Normal  Nasal mucosa, septum and turbinates: Normal without edema or erythema/   Oropharynx: Normal with no erythema, edema, exudate or lesions  Neck  Normal range of motion  Neck supple  Cardiovascular  Auscultation of the heart: Normal rate and rhythm, normal S1 and S2 without murmurs  Examination of the extremities for edema and/or varicosities: Normal  Pulmonary/Chest  Respiratory effort: No increased work of breathing or signs of respiratory distress  Auscultation of lungs: Clear to auscultation, equal breath sounds bilaterally, no wheezes, rales, no rhonchi  Abdomen  Abdomen: Non-tender, no masses  Liver and spleen: No hepatomegaly or splenomegaly  Musculoskeletal  Gait and station: normal   Digits and Nails: normal without clubbing or cyanosis  Inspection/palpation of joints, bones, and muscles: Normal  Neurological  No nystagmus or asterixis  Skin  Skin and subcutaneous tissue: Normal without rashes or lesions  Lymphatic  Palpation of the lymph nodes in neck: No lymphadenopathy  Psychiatric  Orientation to person, place and time: Normal   Mood and affect: Normal          Labs:   Lab Results   Component Value Date    ALT 8 (L) 02/17/2020    AST 19 02/17/2020    BUN 10 02/17/2020    CALCIUM 8 5 02/17/2020     02/17/2020    CO2 24 02/17/2020    CREATININE 0 62 02/17/2020    HCT 34 3 (L) 02/17/2020    HGB 9 9 (L) 02/17/2020     02/17/2020    K 4 0 02/17/2020    WBC 7 43 02/17/2020         X-Rays & Procedures:   No orders to display         ______________________________________________________________________      Assessment & Plan:      Diagnoses and all orders for this visit:    Epigastric pain    Abdominal pain, unspecified abdominal location  -     Ambulatory referral to Gastroenterology    Left upper quadrant pain        I have taken liberty of scheduling the patient for an EGD  This certainly sounds like it might be at least gastritis  Advised that she begin over-the-counter Pepcid 20 mg p o  B i d  Since she had some good response to Prilosec in past   I Will be happy to inform you of her results any further recommendations ,I   Would like to thank you for participate              With warmest regards,    Leslie Warren MD, [facility]

## 2020-03-10 ENCOUNTER — ANESTHESIA EVENT (OUTPATIENT)
Dept: GASTROENTEROLOGY | Facility: HOSPITAL | Age: 25
End: 2020-03-10

## 2020-03-11 ENCOUNTER — ANESTHESIA (OUTPATIENT)
Dept: GASTROENTEROLOGY | Facility: HOSPITAL | Age: 25
End: 2020-03-11

## 2020-03-11 ENCOUNTER — HOSPITAL ENCOUNTER (OUTPATIENT)
Dept: GASTROENTEROLOGY | Facility: HOSPITAL | Age: 25
Setting detail: OUTPATIENT SURGERY
Discharge: HOME | End: 2020-03-11
Attending: INTERNAL MEDICINE | Admitting: INTERNAL MEDICINE
Payer: COMMERCIAL

## 2020-03-11 VITALS
HEIGHT: 62 IN | TEMPERATURE: 97.5 F | SYSTOLIC BLOOD PRESSURE: 111 MMHG | DIASTOLIC BLOOD PRESSURE: 69 MMHG | HEART RATE: 74 BPM | OXYGEN SATURATION: 100 % | WEIGHT: 166.01 LBS | RESPIRATION RATE: 18 BRPM | BODY MASS INDEX: 30.55 KG/M2

## 2020-03-11 DIAGNOSIS — R10.9 ABDOMINAL PAIN, UNSPECIFIED ABDOMINAL LOCATION: ICD-10-CM

## 2020-03-11 DIAGNOSIS — R10.12 LEFT UPPER QUADRANT PAIN: ICD-10-CM

## 2020-03-11 DIAGNOSIS — R10.13 EPIGASTRIC PAIN: ICD-10-CM

## 2020-03-11 PROCEDURE — 88342 IMHCHEM/IMCYTCHM 1ST ANTB: CPT | Performed by: PATHOLOGY

## 2020-03-11 PROCEDURE — 88305 TISSUE EXAM BY PATHOLOGIST: CPT | Performed by: PATHOLOGY

## 2020-03-11 PROCEDURE — 43239 EGD BIOPSY SINGLE/MULTIPLE: CPT | Performed by: INTERNAL MEDICINE

## 2020-03-11 RX ORDER — PROPOFOL 10 MG/ML
INJECTION, EMULSION INTRAVENOUS AS NEEDED
Status: DISCONTINUED | OUTPATIENT
Start: 2020-03-11 | End: 2020-03-11 | Stop reason: SURG

## 2020-03-11 RX ORDER — SODIUM CHLORIDE, SODIUM LACTATE, POTASSIUM CHLORIDE, CALCIUM CHLORIDE 600; 310; 30; 20 MG/100ML; MG/100ML; MG/100ML; MG/100ML
125 INJECTION, SOLUTION INTRAVENOUS CONTINUOUS
Status: DISCONTINUED | OUTPATIENT
Start: 2020-03-11 | End: 2020-03-15 | Stop reason: HOSPADM

## 2020-03-11 RX ORDER — LIDOCAINE HYDROCHLORIDE 20 MG/ML
INJECTION, SOLUTION EPIDURAL; INFILTRATION; INTRACAUDAL; PERINEURAL AS NEEDED
Status: DISCONTINUED | OUTPATIENT
Start: 2020-03-11 | End: 2020-03-11 | Stop reason: SURG

## 2020-03-11 RX ADMIN — PROPOFOL 30 MG: 10 INJECTION, EMULSION INTRAVENOUS at 07:24

## 2020-03-11 RX ADMIN — PROPOFOL 120 MG: 10 INJECTION, EMULSION INTRAVENOUS at 07:22

## 2020-03-11 RX ADMIN — LIDOCAINE HYDROCHLORIDE 100 MG: 20 INJECTION, SOLUTION EPIDURAL; INFILTRATION; INTRACAUDAL; PERINEURAL at 07:22

## 2020-03-11 RX ADMIN — SODIUM CHLORIDE, SODIUM LACTATE, POTASSIUM CHLORIDE, AND CALCIUM CHLORIDE: .6; .31; .03; .02 INJECTION, SOLUTION INTRAVENOUS at 06:50

## 2020-03-11 RX ADMIN — PROPOFOL 20 MG: 10 INJECTION, EMULSION INTRAVENOUS at 07:23

## 2020-03-11 NOTE — ANESTHESIA POSTPROCEDURE EVALUATION
Post-Op Assessment Note    CV Status:  Stable  Pain Score: 0    Pain management: adequate     Mental Status:  Sleepy   Hydration Status:  Euvolemic   PONV Controlled:  Controlled   Airway Patency:  Patent and adequate   Post Op Vitals Reviewed: Yes      Staff: CRNA           BP   104/57   Temp      Pulse  71   Resp   15   SpO2   96%
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
## 2020-03-11 NOTE — DISCHARGE INSTRUCTIONS
Upper Endoscopy   WHAT YOU NEED TO KNOW:   An upper endoscopy is also called an upper gastrointestinal (GI) endoscopy, or an esophagogastroduodenoscopy (EGD)  You may feel bloated, gassy, or have some abdominal discomfort after your procedure  Your throat may be sore for 24 to 36 hours  You may burp or pass gas from air that is still inside your body  DISCHARGE INSTRUCTIONS:   Call 911 for any of the following:   · You have sudden chest pain or trouble breathing  Seek care immediately if:   · You feel dizzy or faint  · You have trouble swallowing  · Your bowel movements are very dark or black  · Your abdomen is hard and firm and you have severe pain  · You vomit blood  Contact your healthcare provider if:   · You feel full or bloated and cannot burp or pass gas  · You have not had a bowel movement for 3 days after your procedure  · You have neck pain  · You have a fever or chills  · You have nausea or are vomiting  · You have a rash or hives  · You have questions or concerns about your endoscopy  Relieve a sore throat:  Suck on throat lozenges or crushed ice  Gargle with a small amount of warm salt water  Mix 1 teaspoon of salt and 1 cup of warm water to make salt water  Relieve gas and discomfort from bloating:  Lie on your right side with a heating pad on your abdomen  Take short walks to help pass gas  Eat small meals until bloating is relieved  Rest after your procedure: You have been given medicine to relax you  Do not  drive or make important decisions until the day after your procedure  Return to your normal activity as directed  You can usually return to work the day after your procedure  Follow up with your healthcare provider as directed:  Write down your questions so you remember to ask them during your visits     © 2017 [address] is for End User's use only and may not be sold, redistributed or otherwise used for commercial purposes  All illustrations and images included in CareNotes® are the copyrighted property of A D A M , Inc  or Garrick Mario  The above information is an  only  It is not intended as medical advice for individual conditions or treatments  Talk to your doctor, nurse or pharmacist before following any medical regimen to see if it is safe and effective for you

## 2020-03-17 ENCOUNTER — TELEPHONE (OUTPATIENT)
Dept: GASTROENTEROLOGY | Facility: CLINIC | Age: 25
End: 2020-03-17

## 2020-03-17 DIAGNOSIS — A04.8 H. PYLORI INFECTION: Primary | ICD-10-CM

## 2020-03-17 RX ORDER — AMOXICILLIN 500 MG/1
1000 TABLET, FILM COATED ORAL 2 TIMES DAILY
Qty: 56 TABLET | Refills: 0 | Status: SHIPPED | OUTPATIENT
Start: 2020-03-17 | End: 2020-03-31

## 2020-03-17 RX ORDER — CLARITHROMYCIN 500 MG/1
500 TABLET, COATED ORAL 2 TIMES DAILY
Qty: 28 TABLET | Refills: 0 | Status: SHIPPED | OUTPATIENT
Start: 2020-03-17 | End: 2020-03-31

## 2020-03-17 RX ORDER — OMEPRAZOLE 20 MG/1
20 CAPSULE, DELAYED RELEASE ORAL 2 TIMES DAILY
Qty: 28 CAPSULE | Refills: 0 | Status: SHIPPED | OUTPATIENT
Start: 2020-03-17 | End: 2020-04-28 | Stop reason: ALTCHOICE

## 2020-03-20 ENCOUNTER — TELEPHONE (OUTPATIENT)
Dept: GASTROENTEROLOGY | Facility: CLINIC | Age: 25
End: 2020-03-20

## 2020-03-20 NOTE — TELEPHONE ENCOUNTER
ptn states she has been having severe stomach discomfort and a low grade fever and chills   Wants to know what is advised

## 2020-03-20 NOTE — TELEPHONE ENCOUNTER
Results given of pathology  Patient was unaware that she had H pylori  Patient was unaware that antibiotics for called to the pharmacy  She is going to pick him up today  She will call back if symptoms worsen

## 2020-03-27 LAB — EXT SARS-COV-2: NOT DETECTED

## 2020-04-09 ENCOUNTER — TELEMEDICINE (OUTPATIENT)
Dept: FAMILY MEDICINE CLINIC | Facility: CLINIC | Age: 25
End: 2020-04-09
Payer: COMMERCIAL

## 2020-04-09 DIAGNOSIS — M62.838 NECK MUSCLE SPASM: Primary | ICD-10-CM

## 2020-04-09 DIAGNOSIS — G43.009 MIGRAINE WITHOUT AURA AND WITHOUT STATUS MIGRAINOSUS, NOT INTRACTABLE: ICD-10-CM

## 2020-04-09 PROBLEM — A60.04 HERPES SIMPLEX VULVOVAGINITIS: Status: RESOLVED | Noted: 2019-10-22 | Resolved: 2020-04-09

## 2020-04-09 PROCEDURE — 99214 OFFICE O/P EST MOD 30 MIN: CPT | Performed by: FAMILY MEDICINE

## 2020-04-09 RX ORDER — CYCLOBENZAPRINE HCL 10 MG
10 TABLET ORAL
Qty: 15 TABLET | Refills: 0 | Status: SHIPPED | OUTPATIENT
Start: 2020-04-09 | End: 2020-04-28 | Stop reason: ALTCHOICE

## 2020-04-23 ENCOUNTER — TELEMEDICINE (OUTPATIENT)
Dept: FAMILY MEDICINE CLINIC | Facility: CLINIC | Age: 25
End: 2020-04-23
Payer: COMMERCIAL

## 2020-04-23 DIAGNOSIS — D50.9 IRON DEFICIENCY ANEMIA, UNSPECIFIED IRON DEFICIENCY ANEMIA TYPE: Primary | ICD-10-CM

## 2020-04-23 DIAGNOSIS — G43.009 MIGRAINE WITHOUT AURA AND WITHOUT STATUS MIGRAINOSUS, NOT INTRACTABLE: ICD-10-CM

## 2020-04-23 DIAGNOSIS — R68.89 COLD INTOLERANCE: ICD-10-CM

## 2020-04-23 PROCEDURE — 99213 OFFICE O/P EST LOW 20 MIN: CPT | Performed by: FAMILY MEDICINE

## 2020-04-28 ENCOUNTER — TELEMEDICINE (OUTPATIENT)
Dept: OBGYN CLINIC | Facility: CLINIC | Age: 25
End: 2020-04-28
Payer: COMMERCIAL

## 2020-04-28 DIAGNOSIS — N76.0 BACTERIAL VAGINITIS: Primary | ICD-10-CM

## 2020-04-28 DIAGNOSIS — B96.89 BACTERIAL VAGINITIS: Primary | ICD-10-CM

## 2020-04-28 PROCEDURE — G2012 BRIEF CHECK IN BY MD/QHP: HCPCS | Performed by: PHYSICIAN ASSISTANT

## 2020-04-28 RX ORDER — BORIC ACID
POWDER (GRAM) MISCELLANEOUS
Qty: 90 MG | Refills: 3 | Status: SHIPPED | OUTPATIENT
Start: 2020-04-28 | End: 2020-08-06

## 2020-05-01 ENCOUNTER — APPOINTMENT (OUTPATIENT)
Dept: LAB | Facility: CLINIC | Age: 25
End: 2020-05-01
Payer: COMMERCIAL

## 2020-05-01 DIAGNOSIS — A04.8 H. PYLORI INFECTION: ICD-10-CM

## 2020-05-01 PROCEDURE — 87338 HPYLORI STOOL AG IA: CPT

## 2020-05-02 LAB — H PYLORI AG STL QL IA: POSITIVE

## 2020-05-04 ENCOUNTER — TELEPHONE (OUTPATIENT)
Dept: OTHER | Facility: OTHER | Age: 25
End: 2020-05-04

## 2020-05-05 ENCOUNTER — TELEMEDICINE (OUTPATIENT)
Dept: GASTROENTEROLOGY | Facility: CLINIC | Age: 25
End: 2020-05-05
Payer: COMMERCIAL

## 2020-05-05 ENCOUNTER — TELEPHONE (OUTPATIENT)
Dept: GASTROENTEROLOGY | Facility: CLINIC | Age: 25
End: 2020-05-05

## 2020-05-05 DIAGNOSIS — A04.8 H. PYLORI INFECTION: Primary | ICD-10-CM

## 2020-05-05 PROCEDURE — 99213 OFFICE O/P EST LOW 20 MIN: CPT | Performed by: INTERNAL MEDICINE

## 2020-05-05 RX ORDER — OMEPRAZOLE 20 MG/1
20 CAPSULE, DELAYED RELEASE ORAL 2 TIMES DAILY
Qty: 28 CAPSULE | Refills: 0 | Status: SHIPPED | OUTPATIENT
Start: 2020-05-05 | End: 2020-07-27

## 2020-05-05 RX ORDER — BISMUTH SUBSALICYLATE 262 MG/1
524 TABLET, CHEWABLE ORAL
Qty: 30 TABLET | Refills: 0 | Status: SHIPPED | OUTPATIENT
Start: 2020-05-05 | End: 2020-05-19

## 2020-05-05 RX ORDER — TETRACYCLINE HYDROCHLORIDE 500 MG/1
500 CAPSULE ORAL 4 TIMES DAILY
Qty: 56 CAPSULE | Refills: 0 | Status: SHIPPED | OUTPATIENT
Start: 2020-05-05 | End: 2020-05-19

## 2020-05-05 RX ORDER — METRONIDAZOLE 250 MG/1
250 TABLET ORAL EVERY 8 HOURS SCHEDULED
Qty: 42 TABLET | Refills: 0 | Status: SHIPPED | OUTPATIENT
Start: 2020-05-05 | End: 2020-05-19

## 2020-05-06 ENCOUNTER — TELEPHONE (OUTPATIENT)
Dept: GASTROENTEROLOGY | Facility: CLINIC | Age: 25
End: 2020-05-06

## 2020-07-10 NOTE — ADDENDUM NOTE
Addended by: Russell Bartlett on: 4/30/2018 12:57 PM     Modules accepted: Orders
Addended by: Sachin Hay on: 4/25/2018 12:43 PM     Modules accepted: Orders
not examined

## 2020-07-27 DIAGNOSIS — A04.8 H. PYLORI INFECTION: Primary | ICD-10-CM

## 2020-07-27 RX ORDER — BISMUTH SUBSALICYLATE 262 MG/1
524 TABLET, CHEWABLE ORAL
Qty: 112 TABLET | Refills: 0 | Status: SHIPPED | OUTPATIENT
Start: 2020-07-27 | End: 2020-08-06

## 2020-07-27 RX ORDER — METRONIDAZOLE 250 MG/1
250 TABLET ORAL EVERY 8 HOURS SCHEDULED
Qty: 42 TABLET | Refills: 0 | Status: SHIPPED | OUTPATIENT
Start: 2020-07-27 | End: 2020-08-06

## 2020-07-27 RX ORDER — OMEPRAZOLE 20 MG/1
20 CAPSULE, DELAYED RELEASE ORAL 2 TIMES DAILY
Qty: 28 CAPSULE | Refills: 0 | Status: SHIPPED | OUTPATIENT
Start: 2020-07-27 | End: 2020-08-06

## 2020-07-27 RX ORDER — TETRACYCLINE HYDROCHLORIDE 500 MG/1
500 CAPSULE ORAL 4 TIMES DAILY
Qty: 56 CAPSULE | Refills: 0 | Status: SHIPPED | OUTPATIENT
Start: 2020-07-27 | End: 2020-08-06

## 2020-07-27 NOTE — TELEPHONE ENCOUNTER
Pt called stating that she never took her treatment for H Pylori & the pharmacy no longer has the medications  Can you please re send the medications

## 2020-08-06 ENCOUNTER — OFFICE VISIT (OUTPATIENT)
Dept: FAMILY MEDICINE CLINIC | Facility: CLINIC | Age: 25
End: 2020-08-06
Payer: COMMERCIAL

## 2020-08-06 VITALS
OXYGEN SATURATION: 98 % | HEART RATE: 89 BPM | DIASTOLIC BLOOD PRESSURE: 60 MMHG | SYSTOLIC BLOOD PRESSURE: 110 MMHG | BODY MASS INDEX: 30.91 KG/M2 | HEIGHT: 62 IN | RESPIRATION RATE: 16 BRPM | TEMPERATURE: 98.7 F | WEIGHT: 168 LBS

## 2020-08-06 DIAGNOSIS — M54.42 CHRONIC LEFT-SIDED LOW BACK PAIN WITH BILATERAL SCIATICA: Primary | ICD-10-CM

## 2020-08-06 DIAGNOSIS — G89.29 CHRONIC LEFT-SIDED LOW BACK PAIN WITH BILATERAL SCIATICA: Primary | ICD-10-CM

## 2020-08-06 DIAGNOSIS — M54.41 CHRONIC LEFT-SIDED LOW BACK PAIN WITH BILATERAL SCIATICA: Primary | ICD-10-CM

## 2020-08-06 DIAGNOSIS — K64.9 HEMORRHOIDS, UNSPECIFIED HEMORRHOID TYPE: ICD-10-CM

## 2020-08-06 DIAGNOSIS — D50.9 IRON DEFICIENCY ANEMIA, UNSPECIFIED IRON DEFICIENCY ANEMIA TYPE: ICD-10-CM

## 2020-08-06 PROCEDURE — 3008F BODY MASS INDEX DOCD: CPT | Performed by: FAMILY MEDICINE

## 2020-08-06 PROCEDURE — 3725F SCREEN DEPRESSION PERFORMED: CPT | Performed by: FAMILY MEDICINE

## 2020-08-06 PROCEDURE — 4004F PT TOBACCO SCREEN RCVD TLK: CPT | Performed by: FAMILY MEDICINE

## 2020-08-06 PROCEDURE — 99214 OFFICE O/P EST MOD 30 MIN: CPT | Performed by: FAMILY MEDICINE

## 2020-08-06 PROCEDURE — 3008F BODY MASS INDEX DOCD: CPT | Performed by: SURGERY

## 2020-08-06 RX ORDER — ONDANSETRON 4 MG/1
TABLET, ORALLY DISINTEGRATING ORAL
COMMUNITY
End: 2020-08-06

## 2020-08-06 RX ORDER — CLOTRIMAZOLE 1 %
CREAM (GRAM) TOPICAL
COMMUNITY
End: 2020-08-06

## 2020-08-06 RX ORDER — CLINDAMYCIN HYDROCHLORIDE 300 MG/1
CAPSULE ORAL
COMMUNITY
End: 2020-08-06

## 2020-08-06 RX ORDER — LIDOCAINE 50 MG/G
1 PATCH TOPICAL DAILY
Qty: 30 PATCH | Refills: 0 | Status: SHIPPED | OUTPATIENT
Start: 2020-08-06 | End: 2020-10-05 | Stop reason: ALTCHOICE

## 2020-08-06 RX ORDER — DICYCLOMINE HCL 20 MG
TABLET ORAL
COMMUNITY
End: 2020-08-06

## 2020-08-06 RX ORDER — DOXYCYCLINE HYCLATE 100 MG/1
CAPSULE ORAL
COMMUNITY
End: 2020-08-06

## 2020-08-06 RX ORDER — CLARITHROMYCIN 500 MG/1
TABLET, COATED ORAL
COMMUNITY
End: 2020-08-06

## 2020-08-06 RX ORDER — EMTRICITABINE AND TENOFOVIR DISOPROXIL FUMARATE 200; 300 MG/1; MG/1
TABLET, FILM COATED ORAL
COMMUNITY
Start: 2020-08-05 | End: 2020-08-06

## 2020-08-06 RX ORDER — AMOXICILLIN 500 MG/1
TABLET, FILM COATED ORAL
COMMUNITY
End: 2020-08-06

## 2020-08-06 RX ORDER — CYCLOBENZAPRINE HCL 10 MG
TABLET ORAL
COMMUNITY
End: 2020-08-06

## 2020-08-06 RX ORDER — NAPROXEN 500 MG/1
TABLET ORAL
COMMUNITY
Start: 2020-08-01 | End: 2020-08-06

## 2020-08-06 NOTE — ASSESSMENT & PLAN NOTE
She is due for labs, advised to get her labs done including CBC and ferritin, the labs order are already placed

## 2020-08-06 NOTE — PROGRESS NOTES
Assessment/Plan:    Problem List Items Addressed This Visit        Nervous and Auditory    Chronic left-sided low back pain with bilateral sciatica - Primary    Relevant Medications    lidocaine (LIDODERM) 5 %    Other Relevant Orders    Ambulatory referral to Physical Therapy  She follows orthopedic in [place], and she says they are planning to give her injection in her back, as her pain is being managed by them,  Advised to take Tylenol or ibuprofen only when needed and then continue follow-up with her back specially  Advised to get physical therapy again, and she is on disability due to this chronic back pain, her x-ray reviewed lumbar spine x-ray is normal       Other    Anemia     She is due for labs, advised to get her labs done including CBC and ferritin, the labs order are already placed           She is going for a hemorrhoid surgery in [facility]    BMI Counseling: Body mass index is 30 73 kg/m²  The BMI is above normal  Nutrition recommendations include decreasing portion sizes and reducing intake of cholesterol  Exercise recommendations include exercising 3-5 times per week  Chief Complaint   Patient presents with    Back Pain       Subjective:   Patient ID: Jose L Blanchard is a 31 y o  female      She says she had work related injury 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  the client who was very heavy and she put her back since then she has chronic back pain and pain radiates to her both legs, she was seeing workman comp doctor and she did physical therapy and was treated and now she is on disability due to this back pain, her x-ray was normal, and she says she also had MRI which was normal but now she could not get an appointment with her workman comp doctor,  She says her pain is 6/10 now and it goes up to 10/10 and it can go down up to 4/10, currently she is not taking any medicine and she is also seeing [facility] , in [facility]  She says they are planning to give her injection in her back, she denies any change in bowel and urine   Review of Systems   Constitutional: Negative for activity change, appetite change, chills, diaphoresis, fatigue, fever and unexpected weight change  HENT: Negative for congestion, dental problem, ear discharge, ear pain, facial swelling, hearing loss, mouth sores, nosebleeds, postnasal drip, rhinorrhea, sinus pressure, sinus pain, sneezing, sore throat, trouble swallowing and voice change  Eyes: Negative for photophobia, pain, discharge, redness and itching  Respiratory: Negative for cough, chest tightness, shortness of breath and wheezing  Cardiovascular: Negative for chest pain, palpitations and leg swelling  Gastrointestinal: Negative for abdominal distention, abdominal pain, blood in stool, constipation, diarrhea and nausea  Endocrine: Negative for cold intolerance, heat intolerance, polydipsia, polyphagia and polyuria  Genitourinary: Negative for dysuria, flank pain, frequency, hematuria and urgency  Musculoskeletal: Positive for back pain  Negative for arthralgias, gait problem, myalgias and neck pain  Skin: Negative for color change and pallor  Allergic/Immunologic: Negative for environmental allergies and food allergies  Neurological: Negative for dizziness, weakness, light-headedness, numbness and headaches  Hematological: Negative for adenopathy  Does not bruise/bleed easily  Psychiatric/Behavioral: Negative for behavioral problems, sleep disturbance and suicidal ideas  The patient is not nervous/anxious  Objective:  Physical Exam   Constitutional: She is oriented to person, place, and time  She appears well-developed  HENT:   Head: Normocephalic and atraumatic  Mouth/Throat: No oropharyngeal exudate  Eyes: Pupils are equal, round, and reactive to light  Conjunctivae are normal  Right eye exhibits no discharge   Left eye exhibits no discharge  No scleral icterus  Neck: Normal range of motion  Neck supple  No tracheal deviation present  No thyromegaly present  Cardiovascular: Normal rate, regular rhythm and normal heart sounds  No murmur heard  Pulmonary/Chest: Effort normal and breath sounds normal  No respiratory distress  She has no wheezes  She has no rales  Abdominal: Soft  Bowel sounds are normal  She exhibits no distension and no mass  There is no abdominal tenderness  There is no rebound  Musculoskeletal: Normal range of motion  General: Tenderness present  Comments: Mild tenderness present on the left lower paraspinous area, straight leg raise negative   Lymphadenopathy:     She has no cervical adenopathy  Neurological: She is alert and oriented to person, place, and time  She has normal reflexes  No cranial nerve deficit  Skin: Skin is warm  No rash noted  No erythema  No pallor  Psychiatric: Her behavior is normal  Judgment and thought content normal    Nursing note and vitals reviewed  Past Surgical History:   Procedure Laterality Date    APPENDECTOMY       SECTION      INDUCED   2012 2020    X 2    [place]  DELIVERY ONLY N/A 2017    Procedure:  SECTION ();   Surgeon: Lexy Jones DO;  [facility] ;  Service: Obstetrics       Family History   Problem Relation Age of Onset    Asthma Mother     Depression Mother    [de-identified] Migraines Mother     Diabetes type II Mother     Other Mother         vertigo    Diabetes Maternal Aunt     Hypertension Maternal Aunt     Diabetes Maternal Grandmother     Hypertension Maternal Grandmother     Breast cancer Maternal Grandmother     Migraines Father     Heart murmur Brother     Lung cancer Paternal Grandmother     Stroke Paternal Grandmother          Current Outpatient Medications:     lidocaine (LIDODERM) 5 %, Apply 1 patch topically daily Remove & Discard patch within 12 hours or as directed by MD, Disp: 30 patch, Rfl: 0    Allergies   Allergen Reactions    Flagyl [Metronidazole] Nausea Only    Macrobid [Nitrofurantoin] Nausea Only       Vitals:    08/06/20 1121 08/06/20 1122   BP:  110/60   Pulse:  89   Resp: 16    Temp:  98 7 °F (37 1 °C)   SpO2:  98%   Weight:  76 2 kg (168 lb)   Height: 5' 2" (1 575 m)

## 2020-08-11 ENCOUNTER — APPOINTMENT (OUTPATIENT)
Dept: LAB | Facility: CLINIC | Age: 25
End: 2020-08-11
Payer: COMMERCIAL

## 2020-08-11 DIAGNOSIS — D50.9 IRON DEFICIENCY ANEMIA, UNSPECIFIED IRON DEFICIENCY ANEMIA TYPE: ICD-10-CM

## 2020-08-11 DIAGNOSIS — R68.89 COLD INTOLERANCE: ICD-10-CM

## 2020-08-11 LAB
BASOPHILS # BLD AUTO: 0.05 THOUSANDS/ΜL (ref 0–0.1)
BASOPHILS NFR BLD AUTO: 1 % (ref 0–1)
EOSINOPHIL # BLD AUTO: 0.25 THOUSAND/ΜL (ref 0–0.61)
EOSINOPHIL NFR BLD AUTO: 5 % (ref 0–6)
ERYTHROCYTE [DISTWIDTH] IN BLOOD BY AUTOMATED COUNT: 15.2 % (ref 11.6–15.1)
FERRITIN SERPL-MCNC: 5 NG/ML (ref 8–388)
HCT VFR BLD AUTO: 34 % (ref 34.8–46.1)
HGB BLD-MCNC: 10.1 G/DL (ref 11.5–15.4)
IMM GRANULOCYTES # BLD AUTO: 0.01 THOUSAND/UL (ref 0–0.2)
IMM GRANULOCYTES NFR BLD AUTO: 0 % (ref 0–2)
LYMPHOCYTES # BLD AUTO: 2.2 THOUSANDS/ΜL (ref 0.6–4.47)
LYMPHOCYTES NFR BLD AUTO: 46 % (ref 14–44)
MCH RBC QN AUTO: 23.4 PG (ref 26.8–34.3)
MCHC RBC AUTO-ENTMCNC: 29.7 G/DL (ref 31.4–37.4)
MCV RBC AUTO: 79 FL (ref 82–98)
MONOCYTES # BLD AUTO: 0.56 THOUSAND/ΜL (ref 0.17–1.22)
MONOCYTES NFR BLD AUTO: 12 % (ref 4–12)
NEUTROPHILS # BLD AUTO: 1.73 THOUSANDS/ΜL (ref 1.85–7.62)
NEUTS SEG NFR BLD AUTO: 36 % (ref 43–75)
NRBC BLD AUTO-RTO: 0 /100 WBCS
PLATELET # BLD AUTO: 369 THOUSANDS/UL (ref 149–390)
PMV BLD AUTO: 10.7 FL (ref 8.9–12.7)
RBC # BLD AUTO: 4.31 MILLION/UL (ref 3.81–5.12)
TSH SERPL DL<=0.05 MIU/L-ACNC: 0.85 UIU/ML (ref 0.36–3.74)
WBC # BLD AUTO: 4.8 THOUSAND/UL (ref 4.31–10.16)

## 2020-08-11 PROCEDURE — 82728 ASSAY OF FERRITIN: CPT

## 2020-08-11 PROCEDURE — 36415 COLL VENOUS BLD VENIPUNCTURE: CPT

## 2020-08-11 PROCEDURE — 85025 COMPLETE CBC W/AUTO DIFF WBC: CPT

## 2020-08-11 PROCEDURE — 84443 ASSAY THYROID STIM HORMONE: CPT

## 2020-09-22 ENCOUNTER — TELEPHONE (OUTPATIENT)
Dept: OBGYN CLINIC | Facility: CLINIC | Age: 25
End: 2020-09-22

## 2020-09-23 ENCOUNTER — TRANSCRIBE ORDERS (OUTPATIENT)
Dept: ADMINISTRATIVE | Facility: HOSPITAL | Age: 25
End: 2020-09-23

## 2020-09-23 ENCOUNTER — OFFICE VISIT (OUTPATIENT)
Dept: LAB | Facility: HOSPITAL | Age: 25
End: 2020-09-23
Payer: COMMERCIAL

## 2020-09-23 ENCOUNTER — APPOINTMENT (OUTPATIENT)
Dept: LAB | Facility: HOSPITAL | Age: 25
End: 2020-09-23
Payer: COMMERCIAL

## 2020-09-23 DIAGNOSIS — Z01.818 OTHER SPECIFIED PRE-OPERATIVE EXAMINATION: ICD-10-CM

## 2020-09-23 DIAGNOSIS — Z01.818 OTHER SPECIFIED PRE-OPERATIVE EXAMINATION: Primary | ICD-10-CM

## 2020-09-23 LAB
ANION GAP SERPL CALCULATED.3IONS-SCNC: 9 MMOL/L (ref 4–13)
APTT PPP: 33 SECONDS (ref 23–37)
ATRIAL RATE: 67 BPM
BACTERIA UR QL AUTO: NORMAL /HPF
BILIRUB UR QL STRIP: NEGATIVE
BUN SERPL-MCNC: 8 MG/DL (ref 5–25)
CALCIUM SERPL-MCNC: 9.3 MG/DL (ref 8.3–10.1)
CHLORIDE SERPL-SCNC: 103 MMOL/L (ref 100–108)
CLARITY UR: ABNORMAL
CO2 SERPL-SCNC: 27 MMOL/L (ref 21–32)
COLOR UR: YELLOW
CREAT SERPL-MCNC: 0.61 MG/DL (ref 0.6–1.3)
ERYTHROCYTE [DISTWIDTH] IN BLOOD BY AUTOMATED COUNT: 16.9 % (ref 11.6–15.1)
GFR SERPL CREATININE-BSD FRML MDRD: 145 ML/MIN/1.73SQ M
GLUCOSE P FAST SERPL-MCNC: 66 MG/DL (ref 65–99)
GLUCOSE UR STRIP-MCNC: NEGATIVE MG/DL
HCG SERPL QL: NEGATIVE
HCT VFR BLD AUTO: 38.9 % (ref 34.8–46.1)
HGB BLD-MCNC: 11.2 G/DL (ref 11.5–15.4)
HGB UR QL STRIP.AUTO: ABNORMAL
INR PPP: 1.12 (ref 0.84–1.19)
KETONES UR STRIP-MCNC: NEGATIVE MG/DL
LEUKOCYTE ESTERASE UR QL STRIP: NEGATIVE
MCH RBC QN AUTO: 23.3 PG (ref 26.8–34.3)
MCHC RBC AUTO-ENTMCNC: 28.8 G/DL (ref 31.4–37.4)
MCV RBC AUTO: 81 FL (ref 82–98)
NITRITE UR QL STRIP: NEGATIVE
NON-SQ EPI CELLS URNS QL MICRO: NORMAL /HPF
P AXIS: 31 DEGREES
PH UR STRIP.AUTO: 5.5 [PH]
PLATELET # BLD AUTO: 290 THOUSANDS/UL (ref 149–390)
PMV BLD AUTO: 11.2 FL (ref 8.9–12.7)
POTASSIUM SERPL-SCNC: 3.5 MMOL/L (ref 3.5–5.3)
PR INTERVAL: 158 MS
PROT UR STRIP-MCNC: NEGATIVE MG/DL
PROTHROMBIN TIME: 13.8 SECONDS (ref 11.6–14.5)
QRS AXIS: 8 DEGREES
QRSD INTERVAL: 100 MS
QT INTERVAL: 380 MS
QTC INTERVAL: 401 MS
RBC # BLD AUTO: 4.81 MILLION/UL (ref 3.81–5.12)
RBC #/AREA URNS AUTO: NORMAL /HPF
SODIUM SERPL-SCNC: 139 MMOL/L (ref 136–145)
SP GR UR STRIP.AUTO: 1.02 (ref 1–1.03)
T WAVE AXIS: 13 DEGREES
UROBILINOGEN UR QL STRIP.AUTO: 0.2 E.U./DL
VENTRICULAR RATE: 67 BPM
WBC # BLD AUTO: 6.2 THOUSAND/UL (ref 4.31–10.16)
WBC #/AREA URNS AUTO: NORMAL /HPF

## 2020-09-23 PROCEDURE — 84703 CHORIONIC GONADOTROPIN ASSAY: CPT

## 2020-09-23 PROCEDURE — 87389 HIV-1 AG W/HIV-1&-2 AB AG IA: CPT

## 2020-09-23 PROCEDURE — 93010 ELECTROCARDIOGRAM REPORT: CPT | Performed by: INTERNAL MEDICINE

## 2020-09-23 PROCEDURE — 80048 BASIC METABOLIC PNL TOTAL CA: CPT

## 2020-09-23 PROCEDURE — 93005 ELECTROCARDIOGRAM TRACING: CPT

## 2020-09-23 PROCEDURE — 85610 PROTHROMBIN TIME: CPT

## 2020-09-23 PROCEDURE — 81001 URINALYSIS AUTO W/SCOPE: CPT | Performed by: SPECIALIST

## 2020-09-23 PROCEDURE — 85027 COMPLETE CBC AUTOMATED: CPT

## 2020-09-23 PROCEDURE — 85730 THROMBOPLASTIN TIME PARTIAL: CPT

## 2020-09-23 PROCEDURE — 36415 COLL VENOUS BLD VENIPUNCTURE: CPT

## 2020-09-24 LAB — HIV 1+2 AB+HIV1 P24 AG SERPL QL IA: NORMAL

## 2020-10-02 RX ORDER — BACILLUS COAGULANS/INULIN 1B-250 MG
CAPSULE ORAL
COMMUNITY
End: 2021-10-07 | Stop reason: ALTCHOICE

## 2020-10-05 ENCOUNTER — OFFICE VISIT (OUTPATIENT)
Dept: FAMILY MEDICINE CLINIC | Facility: CLINIC | Age: 25
End: 2020-10-05
Payer: COMMERCIAL

## 2020-10-05 VITALS
BODY MASS INDEX: 30 KG/M2 | OXYGEN SATURATION: 98 % | HEIGHT: 62 IN | HEART RATE: 72 BPM | DIASTOLIC BLOOD PRESSURE: 70 MMHG | RESPIRATION RATE: 16 BRPM | TEMPERATURE: 99.1 F | WEIGHT: 163 LBS | SYSTOLIC BLOOD PRESSURE: 122 MMHG

## 2020-10-05 DIAGNOSIS — D50.9 IRON DEFICIENCY ANEMIA, UNSPECIFIED IRON DEFICIENCY ANEMIA TYPE: ICD-10-CM

## 2020-10-05 DIAGNOSIS — Z41.1 ENCOUNTER FOR COSMETIC SURGERY: ICD-10-CM

## 2020-10-05 DIAGNOSIS — Z01.818 PREOP EXAMINATION: Primary | ICD-10-CM

## 2020-10-05 PROBLEM — R10.9 ABDOMINAL PAIN: Status: RESOLVED | Noted: 2019-12-23 | Resolved: 2020-10-05

## 2020-10-05 PROBLEM — F32.A DEPRESSION: Status: RESOLVED | Noted: 2017-10-02 | Resolved: 2020-10-05

## 2020-10-05 PROBLEM — L70.9 ACNE: Status: RESOLVED | Noted: 2018-09-19 | Resolved: 2020-10-05

## 2020-10-05 PROBLEM — M62.838 NECK MUSCLE SPASM: Status: RESOLVED | Noted: 2020-04-09 | Resolved: 2020-10-05

## 2020-10-05 PROBLEM — D64.9 ANEMIA: Status: RESOLVED | Noted: 2019-12-23 | Resolved: 2020-10-05

## 2020-10-05 PROBLEM — R68.89 COLD INTOLERANCE: Status: RESOLVED | Noted: 2020-04-23 | Resolved: 2020-10-05

## 2020-10-05 PROCEDURE — 99243 OFF/OP CNSLTJ NEW/EST LOW 30: CPT | Performed by: FAMILY MEDICINE

## 2020-10-05 PROCEDURE — 4004F PT TOBACCO SCREEN RCVD TLK: CPT | Performed by: FAMILY MEDICINE

## 2020-10-05 PROCEDURE — 3725F SCREEN DEPRESSION PERFORMED: CPT | Performed by: FAMILY MEDICINE

## 2020-10-08 ENCOUNTER — TELEPHONE (OUTPATIENT)
Dept: OBGYN CLINIC | Facility: CLINIC | Age: 25
End: 2020-10-08

## 2020-12-22 ENCOUNTER — TELEPHONE (OUTPATIENT)
Dept: NEUROLOGY | Facility: CLINIC | Age: 25
End: 2020-12-22

## 2021-02-09 ENCOUNTER — OFFICE VISIT (OUTPATIENT)
Dept: OBGYN CLINIC | Facility: CLINIC | Age: 26
End: 2021-02-09
Payer: COMMERCIAL

## 2021-02-09 VITALS — BODY MASS INDEX: 32.56 KG/M2 | DIASTOLIC BLOOD PRESSURE: 78 MMHG | WEIGHT: 178 LBS | SYSTOLIC BLOOD PRESSURE: 120 MMHG

## 2021-02-09 DIAGNOSIS — N91.2 AMENORRHEA: ICD-10-CM

## 2021-02-09 DIAGNOSIS — Z11.3 SCREEN FOR STD (SEXUALLY TRANSMITTED DISEASE): Primary | ICD-10-CM

## 2021-02-09 PROBLEM — Z01.818 PREOP EXAMINATION: Status: RESOLVED | Noted: 2020-10-05 | Resolved: 2021-02-09

## 2021-02-09 LAB — SL AMB POCT URINE HCG: POSITIVE

## 2021-02-09 PROCEDURE — 1036F TOBACCO NON-USER: CPT | Performed by: PHYSICIAN ASSISTANT

## 2021-02-09 PROCEDURE — 99213 OFFICE O/P EST LOW 20 MIN: CPT | Performed by: PHYSICIAN ASSISTANT

## 2021-02-09 PROCEDURE — 87591 N.GONORRHOEAE DNA AMP PROB: CPT | Performed by: PHYSICIAN ASSISTANT

## 2021-02-09 PROCEDURE — 87491 CHLMYD TRACH DNA AMP PROBE: CPT | Performed by: PHYSICIAN ASSISTANT

## 2021-02-09 PROCEDURE — 81025 URINE PREGNANCY TEST: CPT | Performed by: PHYSICIAN ASSISTANT

## 2021-02-09 NOTE — PROGRESS NOTES
Roxy Iverson  1995    S:  31 y o  female here for a problem visit  She notes an abnormal vaginal discharge with an odor reminiscent of BV in the past      She had used boric acid for two weeks back in April 2020 and she says she has had no episodes of BV up until now    She is not taking her probiotic since around October 2020    She has a newer partner of 9 months; she says that she has had some "emotional stuff" going on and he has not been very supportive, so she isn't sure he would be supportive of anything else in the future, either  Her LMP was 12/17/2020  She is sexually active and does not use contraception  She is concerned about pregnancy       Past Medical History:   Diagnosis Date    Anemia complicating pregnancy 71/8/8403    Anxiety     Asthma     Cerebral concussion 3/17/2017    Group B Streptococcus carrier, antepartum 12/1/2017    Head injury     Herpes     Migraine     Postpartum depression     Secondary amenorrhea 2/24/2020    Varicella     vaccine     Family History   Problem Relation Age of Onset    Asthma Mother     Depression Mother     Migraines Mother     Diabetes type II Mother     Other Mother         vertigo    Diabetes Maternal Aunt     Hypertension Maternal Aunt     Diabetes Maternal Grandmother     Hypertension Maternal Grandmother     Breast cancer Maternal Grandmother     Migraines Father     Heart murmur Brother     Lung cancer Paternal Grandmother     Stroke Paternal Grandmother      Social History     Socioeconomic History    Marital status: Single     Spouse name: None    Number of children: None    Years of education: None    Highest education level: None   Occupational History    None   Social Needs    Financial resource strain: None    Food insecurity     Worry: None     Inability: None    Transportation needs     Medical: None     Non-medical: None   Tobacco Use    Smoking status: Current Every Day Smoker    Smokeless tobacco: Never Used    Tobacco comment: Never a smoker per [employer]    Substance and Sexual Activity    Alcohol use: Yes     Frequency: Monthly or less    Drug use: No    Sexual activity: Yes     Partners: Male   Lifestyle    Physical activity     Days per week: None     Minutes per session: None    Stress: None   Relationships    Social connections     Talks on phone: None     Gets together: None     Attends Oriental orthodox service: None     Active member of club or organization: None     Attends meetings of clubs or organizations: None     Relationship status: None    Intimate partner violence     Fear of current or ex partner: None     Emotionally abused: None     Physically abused: None     Forced sexual activity: None   Other Topics Concern    None   Social History Narrative    Hx of recent travel        Review of Systems   Respiratory: Negative  Cardiovascular: Negative  Gastrointestinal: Negative for constipation and diarrhea  Genitourinary: Negative for difficulty urinating, pelvic pain, vaginal bleeding, vaginal discharge, itching or odor  O:  /78 (BP Location: Right arm, Patient Position: Sitting, Cuff Size: Standard)   Wt 80 7 kg (178 lb)   LMP 12/17/2020 (Exact Date)   BMI 32 56 kg/m²   She appears well and is in no distress  Abdomen is soft and nontender  External genitals are normal without lesions or rashes  Vagina is normal, scant white discharge   Cervix is normal, no lesions or discharge  Uterus is nontender, no masses  Adnexa are nontender, no pelvic masses appreciated    Wet mount reveals no yeast, no clue cells, no trich, pH 3 5, neg whiff     Urine pregnancy test is positive     Transabdominal US shows a tiny gestational sac with a questionable fetal pole     A/P: Normal vaginal discharge - pt reassured  Likely increased due to early pregnancy    Early pregnancy  - will check quant HCG now and in 48 hours  Likely will terminate   Discussed Anthony Glory IUD after VIP - she is very interested in this

## 2021-02-11 LAB
C TRACH DNA SPEC QL NAA+PROBE: NEGATIVE
N GONORRHOEA DNA SPEC QL NAA+PROBE: NEGATIVE

## 2021-02-19 ENCOUNTER — DOCUMENTATION (OUTPATIENT)
Dept: LABOR AND DELIVERY | Facility: HOSPITAL | Age: 26
End: 2021-02-19

## 2021-02-19 ENCOUNTER — TELEPHONE (OUTPATIENT)
Dept: OTHER | Facility: OTHER | Age: 26
End: 2021-02-19

## 2021-02-20 ENCOUNTER — LAB (OUTPATIENT)
Dept: LAB | Facility: CLINIC | Age: 26
End: 2021-02-20
Payer: COMMERCIAL

## 2021-02-20 DIAGNOSIS — N91.2 AMENORRHEA: ICD-10-CM

## 2021-02-20 LAB — B-HCG SERPL-ACNC: 7191 MIU/ML

## 2021-02-20 PROCEDURE — 84702 CHORIONIC GONADOTROPIN TEST: CPT

## 2021-02-20 PROCEDURE — 36415 COLL VENOUS BLD VENIPUNCTURE: CPT

## 2021-02-20 NOTE — PROGRESS NOTES
Pt with cramping, bleeding and passage of clot with sac  Similar to prior miscarriage  Changing pads less than every hour  Has serial bhcg testing ordered - recommended completion tomorrow and Monday

## 2021-02-22 ENCOUNTER — TELEPHONE (OUTPATIENT)
Dept: OBGYN CLINIC | Facility: CLINIC | Age: 26
End: 2021-02-22

## 2021-02-22 DIAGNOSIS — N91.2 AMENORRHEA: Primary | ICD-10-CM

## 2021-02-22 NOTE — TELEPHONE ENCOUNTER
----- Message from Geo Alonzo PA-C sent at 2/22/2021 10:09 AM EST -----  Please let Sindy Salmeron know that her blood pregnancy test is positive  I would not expect it to double at this point - it is high enough that they should be able to see something in her uterus  I believe her plan was to terminate, but please find out what she would like to do  Thanks!

## 2021-02-22 NOTE — TELEPHONE ENCOUNTER
Called and spoke to Genie regarding her results  She stated that she had a miscarriage on Friday and would like to go for another quant to be sure her levels are dropping

## 2021-02-23 ENCOUNTER — OFFICE VISIT (OUTPATIENT)
Dept: OBGYN CLINIC | Facility: CLINIC | Age: 26
End: 2021-02-23

## 2021-02-23 ENCOUNTER — TELEPHONE (OUTPATIENT)
Dept: OBGYN CLINIC | Facility: CLINIC | Age: 26
End: 2021-02-23

## 2021-02-23 VITALS — SYSTOLIC BLOOD PRESSURE: 122 MMHG | DIASTOLIC BLOOD PRESSURE: 62 MMHG | WEIGHT: 170.6 LBS | BODY MASS INDEX: 31.2 KG/M2

## 2021-02-23 DIAGNOSIS — N89.8 VAGINAL ODOR: ICD-10-CM

## 2021-02-23 DIAGNOSIS — O20.9 BLEEDING IN EARLY PREGNANCY: Primary | ICD-10-CM

## 2021-02-23 NOTE — TELEPHONE ENCOUNTER
Spoke with pt who states rima has a mc 2/20  Seen at [facility] er  Had hcg yesterday which was 7,000 and was to go again today, but c/o cramping which she rates as an 8 and a foul odor similar to bv  sched with estephania at 3pm

## 2021-02-23 NOTE — ASSESSMENT & PLAN NOTE
Exam c/w possible SAB vs first trimester bleeding vs ectopic (less likely as GS and possible FP visualized on U/S 2 weeks ago)  Needs repeat quant ASAP  LMP 12/17/21  If quant dropping follow down to zero  If quant is rising needs U/S  To call with increased bleeding or pain  ED if severe   Agrees to plan

## 2021-02-23 NOTE — ASSESSMENT & PLAN NOTE
Odor today c/w blood but patient feels has BV  If gets quant rx for Flagyl 500 mg BID x 7 days (she was supposed to get repeat quant yesterday and did not go)  Has used Flagyl in the past   Agrees to plan

## 2021-02-23 NOTE — TELEPHONE ENCOUNTER
Pt is going through a miscarriage; she will be going to get her 2nd labwork done today; she is having a lot of cramping; she is having bleeding -it has odor to it  How soon should she be seen?

## 2021-02-24 ENCOUNTER — APPOINTMENT (OUTPATIENT)
Dept: LAB | Facility: CLINIC | Age: 26
End: 2021-02-24
Payer: COMMERCIAL

## 2021-02-24 ENCOUNTER — TELEPHONE (OUTPATIENT)
Dept: OBGYN CLINIC | Facility: CLINIC | Age: 26
End: 2021-02-24

## 2021-02-24 DIAGNOSIS — O02.1 MISSED AB: ICD-10-CM

## 2021-02-24 DIAGNOSIS — N91.2 AMENORRHEA: ICD-10-CM

## 2021-02-24 DIAGNOSIS — N89.8 VAGINAL ODOR: Primary | ICD-10-CM

## 2021-02-24 LAB — B-HCG SERPL-ACNC: 958 MIU/ML

## 2021-02-24 PROCEDURE — 36415 COLL VENOUS BLD VENIPUNCTURE: CPT

## 2021-02-24 PROCEDURE — 84702 CHORIONIC GONADOTROPIN TEST: CPT

## 2021-02-25 NOTE — TELEPHONE ENCOUNTER
----- Message from Ayo Mena PA-C sent at 2/24/2021  5:46 PM EST -----    ----- Message -----  From: Lab, Background User  Sent: 2/24/2021   5:02 PM EST  To: Ayo Mena PA-C

## 2021-02-25 NOTE — TELEPHONE ENCOUNTER
Aware of declining quant  Flagyl ordered for vaginal odor  States bleeding is decreasing  Will repeat quant 1 week -order placed

## 2021-02-25 NOTE — TELEPHONE ENCOUNTER
----- Message from Lashaun Funk PA-C sent at 2/24/2021  5:46 PM EST -----    ----- Message -----  From: Lab, Background User  Sent: 2/24/2021   5:02 PM EST  To: Lashaun Funk PA-C

## 2021-02-26 NOTE — PROGRESS NOTES
Assessment/Plan:    Bleeding in early pregnancy  Exam c/w possible SAB vs first trimester bleeding vs ectopic (less likely as GS and possible FP visualized on U/S 2 weeks ago)  Needs repeat quant ASAP  LMP 12/17/21  If quant dropping follow down to zero  If quant is rising needs U/S  To call with increased bleeding or pain  ED if severe  Agrees to plan    Vaginal odor  Odor today c/w blood but patient feels has BV  If gets quant rx for Flagyl 500 mg BID x 7 days (she was supposed to get repeat quant yesterday and did not go)  Has used Flagyl in the past   Agrees to plan  Diagnoses and all orders for this visit:    Bleeding in early pregnancy    Vaginal odor        Subjective:      Patient ID: Anand Wagner is a 31 y o  female  Hoda River is a 31year old who presents with complaint of vaginal bleeding and odor  She is saturating < pad q 2-3 hours  Feels has BV D/T foul odor  She had a positive urine pregnancy test in our office on 2/9  LMP 12/17/21  Unplanned pregnancy  At that time a bedside U/S showed a GS and possible FP  No measurements were taken  She was given order for quat HCG but did not go until 2/20 @ which time her quant was 7191  She started bleeding on 2/19-heavy with clots  She went to [facility] ED but left AMA  She called and spoke with [facility] who encouraged her to get serial quants  She was supposed to repeat quant yesterday but did not go due to snow  The following portions of the patient's history were reviewed and updated as appropriate: allergies, current medications, past family history, past medical history, past social history, past surgical history and problem list     Review of Systems   Constitutional: Negative for chills, fatigue and fever  HENT: Negative for congestion, sneezing and sore throat  Respiratory: Negative for cough and shortness of breath  Gastrointestinal: Negative for abdominal pain  Genitourinary: Positive for vaginal bleeding   Negative for dysuria and pelvic pain  Skin: Negative  Neurological: Negative  Negative for light-headedness  Hematological: Negative  Psychiatric/Behavioral: Negative  Objective:      /62 (BP Location: Right arm, Patient Position: Sitting, Cuff Size: Standard)   Wt 77 4 kg (170 lb 9 6 oz)   LMP 12/17/2020   BMI 31 20 kg/m²          Physical Exam  Constitutional:       Appearance: Normal appearance  Genitourinary:     Labia:         Right: No rash, tenderness or lesion  Left: No rash, tenderness or lesion  Vagina: Bleeding present  No vaginal discharge  Cervix: Cervical bleeding present  No cervical motion tenderness or friability  Uterus: Normal        Adnexa:         Right: No mass, tenderness or fullness  Left: No mass, tenderness or fullness  Comments: Vagina with moderate amount of blood and small clots  Neurological:      Mental Status: She is alert

## 2021-03-10 ENCOUNTER — TELEPHONE (OUTPATIENT)
Dept: OBGYN CLINIC | Facility: CLINIC | Age: 26
End: 2021-03-10

## 2021-03-10 DIAGNOSIS — N89.8 VAGINAL ODOR: Primary | ICD-10-CM

## 2021-03-10 RX ORDER — METRONIDAZOLE 500 MG/1
500 TABLET ORAL EVERY 12 HOURS SCHEDULED
Qty: 14 TABLET | Refills: 0 | Status: SHIPPED | OUTPATIENT
Start: 2021-03-10 | End: 2021-03-17

## 2021-03-10 NOTE — TELEPHONE ENCOUNTER
Shannon Traore was to send in medication for her- and the pharmacy didn't have a prescription for it    [address]

## 2021-03-11 ENCOUNTER — TELEPHONE (OUTPATIENT)
Dept: OBGYN CLINIC | Facility: CLINIC | Age: 26
End: 2021-03-11

## 2021-04-06 ENCOUNTER — APPOINTMENT (OUTPATIENT)
Dept: LAB | Facility: MEDICAL CENTER | Age: 26
End: 2021-04-06
Payer: COMMERCIAL

## 2021-04-06 ENCOUNTER — OFFICE VISIT (OUTPATIENT)
Dept: OBGYN CLINIC | Facility: MEDICAL CENTER | Age: 26
End: 2021-04-06
Payer: COMMERCIAL

## 2021-04-06 VITALS
BODY MASS INDEX: 30.18 KG/M2 | WEIGHT: 164 LBS | DIASTOLIC BLOOD PRESSURE: 82 MMHG | SYSTOLIC BLOOD PRESSURE: 122 MMHG | HEIGHT: 62 IN

## 2021-04-06 DIAGNOSIS — L70.0 ACNE VULGARIS: ICD-10-CM

## 2021-04-06 DIAGNOSIS — Z32.02 NEGATIVE PREGNANCY TEST: ICD-10-CM

## 2021-04-06 DIAGNOSIS — N93.9 ABNORMAL UTERINE BLEEDING (AUB): ICD-10-CM

## 2021-04-06 DIAGNOSIS — N89.8 VAGINAL DISCHARGE: ICD-10-CM

## 2021-04-06 DIAGNOSIS — N93.9 ABNORMAL UTERINE BLEEDING (AUB): Primary | ICD-10-CM

## 2021-04-06 DIAGNOSIS — G43.809 OTHER MIGRAINE WITHOUT STATUS MIGRAINOSUS, NOT INTRACTABLE: ICD-10-CM

## 2021-04-06 LAB
SL AMB POCT URINE HCG: NEGATIVE
TSH SERPL DL<=0.05 MIU/L-ACNC: 0.92 UIU/ML (ref 0.36–3.74)

## 2021-04-06 PROCEDURE — 3008F BODY MASS INDEX DOCD: CPT | Performed by: STUDENT IN AN ORGANIZED HEALTH CARE EDUCATION/TRAINING PROGRAM

## 2021-04-06 PROCEDURE — 84443 ASSAY THYROID STIM HORMONE: CPT

## 2021-04-06 PROCEDURE — 1036F TOBACCO NON-USER: CPT | Performed by: STUDENT IN AN ORGANIZED HEALTH CARE EDUCATION/TRAINING PROGRAM

## 2021-04-06 PROCEDURE — 87070 CULTURE OTHR SPECIMN AEROBIC: CPT | Performed by: STUDENT IN AN ORGANIZED HEALTH CARE EDUCATION/TRAINING PROGRAM

## 2021-04-06 PROCEDURE — 36415 COLL VENOUS BLD VENIPUNCTURE: CPT

## 2021-04-06 PROCEDURE — 99215 OFFICE O/P EST HI 40 MIN: CPT | Performed by: STUDENT IN AN ORGANIZED HEALTH CARE EDUCATION/TRAINING PROGRAM

## 2021-04-06 PROCEDURE — 81025 URINE PREGNANCY TEST: CPT | Performed by: STUDENT IN AN ORGANIZED HEALTH CARE EDUCATION/TRAINING PROGRAM

## 2021-04-06 PROCEDURE — 87077 CULTURE AEROBIC IDENTIFY: CPT | Performed by: STUDENT IN AN ORGANIZED HEALTH CARE EDUCATION/TRAINING PROGRAM

## 2021-04-06 NOTE — PROGRESS NOTES
Assessment/Plan:     30 yo [id number] -    Problem List Items Addressed This Visit    Visit Diagnoses     Abnormal uterine bleeding (AUB)    -  Primary    Relevant Orders    TSH, 3rd generation with Free T4 reflex    US pelvis complete w transvaginal  Discussed treatment options - would like to try mirena IUD  Vaginal discharge        Relevant Orders    Genital Comprehensive Culture    Negative pregnancy test        Relevant Orders    POCT urine HCG (Completed)    Other migraine without status migrainosus, not intractable        Relevant Orders    Ambulatory referral to Neurology    Acne vulgaris      Can try OCP if cleared by neurology - questionable h/o migraine w/ aura  Subjective:     Patient ID: Kenny Cruz is a 31 y o  female  30 yo [id number] presents to discuss several issues  She reports a long history of heavy, painful periods w/ intermenstrual bleeding  She did have a miscarriage in February  Hcg followed from 7000 > 958 but did not follow to 0  Also reports painful intercourse  Reports during appendectomy was told she has fibroids  H/o recurrent BV - has foul smelling discharge  Also concerned re: acne - was told by  it may be hormone related  Has complex skin care routine with chemical peels  Denies h/o VTE, htn, liver or breast disease  Reports h/o migraine since age 10 - sometimes has facial numbness/tingling and vision changes  Does not follow with neurology  Reports being diagnosed with migraine but unsure about migraine w/ aura  Review of Systems   All other systems reviewed and are negative  Objective:     Physical Exam  Pulmonary:      Effort: Pulmonary effort is normal    Genitourinary:     Labia:         Right: No rash  Left: No rash  Vagina: Normal       Uterus: Tender  Not enlarged  Adnexa:         Right: No mass  Left: No mass  Comments: Thin white discharge    On microscopy: no yeast, clue cells, neg whiff test, pH wnl   Neurological:      Mental Status: She is alert and oriented to person, place, and time     Psychiatric:         Behavior: Behavior normal

## 2021-04-09 ENCOUNTER — TELEPHONE (OUTPATIENT)
Dept: OBGYN CLINIC | Facility: CLINIC | Age: 26
End: 2021-04-09

## 2021-04-09 DIAGNOSIS — B96.89 BV (BACTERIAL VAGINOSIS): Primary | ICD-10-CM

## 2021-04-09 DIAGNOSIS — N76.0 BV (BACTERIAL VAGINOSIS): Primary | ICD-10-CM

## 2021-04-09 RX ORDER — METRONIDAZOLE 7.5 MG/G
GEL TOPICAL 2 TIMES DAILY
Qty: 45 G | Refills: 0 | Status: SHIPPED | OUTPATIENT
Start: 2021-04-09 | End: 2021-04-13

## 2021-04-09 RX ORDER — METRONIDAZOLE 500 MG/1
500 TABLET ORAL EVERY 12 HOURS SCHEDULED
Qty: 14 TABLET | Refills: 0 | Status: SHIPPED | OUTPATIENT
Start: 2021-04-09 | End: 2021-04-13

## 2021-04-09 NOTE — TELEPHONE ENCOUNTER
----- Message from Sadia Wei MD sent at 4/9/2021 10:03 AM EDT -----  Please inform Geneva Barone that her culture showed BV   I sent flagyl to her pharmacy - I see she gets nausea with it so let nme know if she'd prefer a vaginal gel instead

## 2021-04-10 LAB
BACTERIA GENITAL AEROBE CULT: ABNORMAL
BACTERIA GENITAL AEROBE CULT: ABNORMAL

## 2021-04-12 ENCOUNTER — TELEPHONE (OUTPATIENT)
Dept: OBGYN CLINIC | Facility: CLINIC | Age: 26
End: 2021-04-12

## 2021-04-12 NOTE — TELEPHONE ENCOUNTER
Pt cannot take flagyl  The metrogel rx was ordered to be used topically - did you mean that?  Thanks

## 2021-04-12 NOTE — TELEPHONE ENCOUNTER
Patient states she was seen by Dr Marval Heimlich last week for vaginitis  Flagyl and metrogel was sent into patients pharmacy  Patient says she cannot talk Flagyl because it makes her sick and metrogel that was sent it is not for vaginal use

## 2021-04-13 DIAGNOSIS — N76.0 BV (BACTERIAL VAGINOSIS): Primary | ICD-10-CM

## 2021-04-13 DIAGNOSIS — B96.89 BV (BACTERIAL VAGINOSIS): Primary | ICD-10-CM

## 2021-04-13 RX ORDER — METRONIDAZOLE 7.5 MG/G
1 GEL VAGINAL 2 TIMES DAILY
Qty: 70 G | Refills: 0 | Status: SHIPPED | OUTPATIENT
Start: 2021-04-13 | End: 2021-04-18

## 2021-04-27 ENCOUNTER — HOSPITAL ENCOUNTER (OUTPATIENT)
Dept: ULTRASOUND IMAGING | Facility: HOSPITAL | Age: 26
Discharge: HOME | End: 2021-04-27
Attending: STUDENT IN AN ORGANIZED HEALTH CARE EDUCATION/TRAINING PROGRAM
Payer: COMMERCIAL

## 2021-04-27 DIAGNOSIS — N93.9 ABNORMAL UTERINE BLEEDING (AUB): ICD-10-CM

## 2021-04-27 PROCEDURE — 76830 TRANSVAGINAL US NON-OB: CPT

## 2021-04-27 PROCEDURE — 76856 US EXAM PELVIC COMPLETE: CPT

## 2021-05-04 ENCOUNTER — OFFICE VISIT (OUTPATIENT)
Dept: OBGYN CLINIC | Facility: MEDICAL CENTER | Age: 26
End: 2021-05-04

## 2021-05-04 ENCOUNTER — TELEPHONE (OUTPATIENT)
Dept: OBGYN CLINIC | Facility: CLINIC | Age: 26
End: 2021-05-04

## 2021-05-04 VITALS — BODY MASS INDEX: 30.58 KG/M2 | SYSTOLIC BLOOD PRESSURE: 108 MMHG | WEIGHT: 167.2 LBS | DIASTOLIC BLOOD PRESSURE: 66 MMHG

## 2021-05-04 DIAGNOSIS — Z32.02 NEGATIVE PREGNANCY TEST: Primary | ICD-10-CM

## 2021-05-04 DIAGNOSIS — Z30.430 ENCOUNTER FOR INSERTION OF MIRENA IUD: ICD-10-CM

## 2021-05-04 NOTE — TELEPHONE ENCOUNTER
----- Message from Cherise Martínez sent at 5/4/2021  1:24 PM EDT -----  Regarding: Non-Urgent Medical Question  Contact: [phone]  Hi Dr Dolores Robbins, so sorry for having to leave the office today in a rush  I'm reaching out to see if you would be able to check my hormone levels?  Maybe I can do something to regulate that if they are high

## 2021-05-05 NOTE — TELEPHONE ENCOUNTER
Please notify rafael - her recent thyroid level was normal  There are not other hormones I recommend checking  Please confirm that she does, indeed, want an IUD and is planning to come to her appt tomorrow  She no-showed for one appt and left yesterday prior to receiving it  If she wants to use her appt tomoorrow to discuss other options that's fine too

## 2021-05-05 NOTE — TELEPHONE ENCOUNTER
Pts face breaks out and thought it was hormonal  I suggested to see derm   Pt is coming tomorrow for Marcia

## 2021-05-10 ENCOUNTER — TRANSCRIBE ORDERS (OUTPATIENT)
Dept: LAB | Facility: CLINIC | Age: 26
End: 2021-05-10

## 2021-05-10 ENCOUNTER — APPOINTMENT (OUTPATIENT)
Dept: LAB | Facility: CLINIC | Age: 26
End: 2021-05-10
Payer: COMMERCIAL

## 2021-05-10 DIAGNOSIS — Z79.899 ENCOUNTER FOR LONG-TERM (CURRENT) USE OF OTHER MEDICATIONS: ICD-10-CM

## 2021-05-10 DIAGNOSIS — Z79.899 ENCOUNTER FOR LONG-TERM (CURRENT) USE OF OTHER MEDICATIONS: Primary | ICD-10-CM

## 2021-05-10 DIAGNOSIS — J30.2 SEASONAL ALLERGIC RHINITIS, UNSPECIFIED TRIGGER: ICD-10-CM

## 2021-05-10 DIAGNOSIS — L70.0 ACNE VULGARIS: ICD-10-CM

## 2021-05-10 LAB
25(OH)D3 SERPL-MCNC: 8.9 NG/ML (ref 30–100)
ALBUMIN SERPL BCP-MCNC: 3.5 G/DL (ref 3.5–5)
ALP SERPL-CCNC: 56 U/L (ref 46–116)
ALT SERPL W P-5'-P-CCNC: 14 U/L (ref 12–78)
ANION GAP SERPL CALCULATED.3IONS-SCNC: 4 MMOL/L (ref 4–13)
AST SERPL W P-5'-P-CCNC: 13 U/L (ref 5–45)
BASOPHILS # BLD AUTO: 0.06 THOUSANDS/ΜL (ref 0–0.1)
BASOPHILS NFR BLD AUTO: 1 % (ref 0–1)
BILIRUB SERPL-MCNC: 0.23 MG/DL (ref 0.2–1)
BUN SERPL-MCNC: 10 MG/DL (ref 5–25)
CALCIUM SERPL-MCNC: 8.7 MG/DL (ref 8.3–10.1)
CHLORIDE SERPL-SCNC: 107 MMOL/L (ref 100–108)
CHOLEST SERPL-MCNC: 179 MG/DL (ref 50–200)
CO2 SERPL-SCNC: 26 MMOL/L (ref 21–32)
CREAT SERPL-MCNC: 0.47 MG/DL (ref 0.6–1.3)
EOSINOPHIL # BLD AUTO: 0.25 THOUSAND/ΜL (ref 0–0.61)
EOSINOPHIL NFR BLD AUTO: 4 % (ref 0–6)
ERYTHROCYTE [DISTWIDTH] IN BLOOD BY AUTOMATED COUNT: 14.1 % (ref 11.6–15.1)
GFR SERPL CREATININE-BSD FRML MDRD: 158 ML/MIN/1.73SQ M
GLUCOSE P FAST SERPL-MCNC: 84 MG/DL (ref 65–99)
HCT VFR BLD AUTO: 38.2 % (ref 34.8–46.1)
HDLC SERPL-MCNC: 47 MG/DL
HGB BLD-MCNC: 11.4 G/DL (ref 11.5–15.4)
IMM GRANULOCYTES # BLD AUTO: 0.02 THOUSAND/UL (ref 0–0.2)
IMM GRANULOCYTES NFR BLD AUTO: 0 % (ref 0–2)
LDLC SERPL CALC-MCNC: 118 MG/DL (ref 0–100)
LYMPHOCYTES # BLD AUTO: 2.29 THOUSANDS/ΜL (ref 0.6–4.47)
LYMPHOCYTES NFR BLD AUTO: 35 % (ref 14–44)
MCH RBC QN AUTO: 25.2 PG (ref 26.8–34.3)
MCHC RBC AUTO-ENTMCNC: 29.8 G/DL (ref 31.4–37.4)
MCV RBC AUTO: 85 FL (ref 82–98)
MONOCYTES # BLD AUTO: 0.67 THOUSAND/ΜL (ref 0.17–1.22)
MONOCYTES NFR BLD AUTO: 10 % (ref 4–12)
NEUTROPHILS # BLD AUTO: 3.17 THOUSANDS/ΜL (ref 1.85–7.62)
NEUTS SEG NFR BLD AUTO: 50 % (ref 43–75)
NONHDLC SERPL-MCNC: 132 MG/DL
NRBC BLD AUTO-RTO: 0 /100 WBCS
PLATELET # BLD AUTO: 319 THOUSANDS/UL (ref 149–390)
PMV BLD AUTO: 11.1 FL (ref 8.9–12.7)
POTASSIUM SERPL-SCNC: 4.3 MMOL/L (ref 3.5–5.3)
PROT SERPL-MCNC: 7.9 G/DL (ref 6.4–8.2)
RBC # BLD AUTO: 4.52 MILLION/UL (ref 3.81–5.12)
SODIUM SERPL-SCNC: 137 MMOL/L (ref 136–145)
TRIGL SERPL-MCNC: 68 MG/DL
WBC # BLD AUTO: 6.46 THOUSAND/UL (ref 4.31–10.16)

## 2021-05-10 PROCEDURE — 80053 COMPREHEN METABOLIC PANEL: CPT

## 2021-05-10 PROCEDURE — 85025 COMPLETE CBC W/AUTO DIFF WBC: CPT

## 2021-05-10 PROCEDURE — 80061 LIPID PANEL: CPT

## 2021-05-10 PROCEDURE — 82306 VITAMIN D 25 HYDROXY: CPT

## 2021-05-10 PROCEDURE — 36415 COLL VENOUS BLD VENIPUNCTURE: CPT

## 2021-06-15 ENCOUNTER — OFFICE VISIT (OUTPATIENT)
Dept: FAMILY MEDICINE CLINIC | Facility: CLINIC | Age: 26
End: 2021-06-15
Payer: COMMERCIAL

## 2021-06-15 VITALS
DIASTOLIC BLOOD PRESSURE: 70 MMHG | WEIGHT: 164 LBS | SYSTOLIC BLOOD PRESSURE: 110 MMHG | RESPIRATION RATE: 16 BRPM | HEART RATE: 72 BPM | OXYGEN SATURATION: 99 % | HEIGHT: 62 IN | BODY MASS INDEX: 30.18 KG/M2

## 2021-06-15 DIAGNOSIS — K21.9 GASTROESOPHAGEAL REFLUX DISEASE WITHOUT ESOPHAGITIS: ICD-10-CM

## 2021-06-15 DIAGNOSIS — Z00.00 ANNUAL PHYSICAL EXAM: Primary | ICD-10-CM

## 2021-06-15 PROBLEM — N89.8 VAGINAL ODOR: Status: RESOLVED | Noted: 2021-02-23 | Resolved: 2021-06-15

## 2021-06-15 PROCEDURE — 99395 PREV VISIT EST AGE 18-39: CPT | Performed by: FAMILY MEDICINE

## 2021-06-15 PROCEDURE — 1036F TOBACCO NON-USER: CPT | Performed by: FAMILY MEDICINE

## 2021-06-15 PROCEDURE — 3725F SCREEN DEPRESSION PERFORMED: CPT | Performed by: FAMILY MEDICINE

## 2021-06-15 RX ORDER — PANTOPRAZOLE SODIUM 40 MG/1
40 TABLET, DELAYED RELEASE ORAL DAILY
Qty: 30 TABLET | Refills: 0 | Status: SHIPPED | OUTPATIENT
Start: 2021-06-15 | End: 2021-10-07 | Stop reason: SDUPTHER

## 2021-06-15 NOTE — PATIENT INSTRUCTIONS

## 2021-06-15 NOTE — PROGRESS NOTES
[address]    NAME: Jose Biggs  AGE: 31 y o  SEX: female  : 1995     DATE: 6/15/2021     Assessment and Plan:     Problem List Items Addressed This Visit        Digestive    Gastroesophageal reflux disease without esophagitis    Relevant Medications    pantoprazole (PROTONIX) 40 mg tablet    Other Relevant Orders    Ambulatory referral to Nutrition Services    H  pylori antigen, stool   discussed in length about the low-fat and non spicy diet, and she needs to see a dietitian for GERD diet   will start her on Protonix and she will get the H pylori stool testing and follow-up with gastroenterologist       Other    BMI 30 0-30 9,adult    Relevant Orders    Ambulatory referral to Nutrition Services    Annual physical exam - Primary          Immunizations and preventive care screenings were discussed with patient today  Appropriate education was printed on patient's after visit summary  Counseling:  Dental Health: discussed importance of regular tooth brushing, flossing, and dental visits  · Exercise: the importance of regular exercise/physical activity was discussed  Recommend exercise 3-5 times per week for at least 30 minutes  BMI Counseling: Body mass index is 30 kg/m²  The BMI is above normal  Nutrition recommendations include decreasing portion sizes, decreasing fast food intake, consuming healthier snacks, limiting drinks that contain sugar, moderation in carbohydrate intake, increasing intake of lean protein and reducing intake of cholesterol  Exercise recommendations include exercising 3-5 times per week  No follow-ups on file  Chief Complaint:     Chief Complaint   Patient presents with    Annual Exam      History of Present Illness:     Adult Annual Physical   Patient here for a comprehensive physical exam  The patient reports problems - stomach pain , gerd    She says she has history of H pylori and she was treated but she did not finish the antibiotic now she feels lot of abdominal discomfort bloating and acidity coming up to the throat and she went to the ER and was advised to take the Prilosec she only took tries, her diet is regular and fatty food    Diet and Physical Activity  · Diet/Nutrition: poor diet  · Exercise: no formal exercise  Depression Screening  PHQ-9 Depression Screening    PHQ-9:   Frequency of the following problems over the past two weeks:      Little interest or pleasure in doing things: 0 - not at all  Feeling down, depressed, or hopeless: 0 - not at all  PHQ-2 Score: 0       General Health  · Sleep: sleeps well  · Hearing: normal - bilateral   · Vision: no vision problems  · Dental: due       /GYN Health  · Last menstrual period: 2 wks ago   · Contraceptive method: non  · History of STDs?: no      Review of Systems:     Review of Systems   Constitutional: Negative for activity change, appetite change, chills, fatigue, fever and unexpected weight change  HENT: Negative for congestion, ear discharge, ear pain, nosebleeds, postnasal drip, rhinorrhea, sinus pressure, sneezing, sore throat, trouble swallowing and voice change  Eyes: Negative for photophobia, pain, discharge, redness and itching  Respiratory: Negative for cough, chest tightness, shortness of breath and wheezing  Cardiovascular: Negative for chest pain, palpitations and leg swelling  Gastrointestinal: Positive for abdominal pain  Negative for constipation, diarrhea, nausea and vomiting  Gerd   Endocrine: Negative for polyuria  Genitourinary: Negative for dysuria, frequency and urgency  Musculoskeletal: Negative for arthralgias, back pain, myalgias and neck pain  Skin: Negative for color change, pallor and rash  Allergic/Immunologic: Negative for environmental allergies and food allergies  Neurological: Negative for dizziness, weakness, light-headedness and headaches  Hematological: Negative for adenopathy  Does not bruise/bleed easily  Psychiatric/Behavioral: Negative for behavioral problems  The patient is not nervous/anxious  Past Medical History:     Past Medical History:   Diagnosis Date    Anemia complicating pregnancy 8126    Anxiety     Asthma     Cerebral concussion 3/17/2017    Group B Streptococcus carrier, antepartum 2017    Head injury     Herpes     Migraine     Postpartum depression     Secondary amenorrhea 2020    Varicella     vaccine      Past Surgical History:     Past Surgical History:   Procedure Laterality Date    APPENDECTOMY       SECTION      INDUCED   2012 2020    X 2    [place]  DELIVERY ONLY N/A 2017    Procedure:  SECTION (); Surgeon: Rashawn Mendez DO;  Location: [facility];  Service: Obstetrics      Social History:     Social History     Socioeconomic History    Marital status: Single     Spouse name: None    Number of children: None    Years of education: None    Highest education level: None   Occupational History    None   Tobacco Use    Smoking status: Former Smoker    Smokeless tobacco: Never Used   Vaping Use    Vaping Use: Never used   Substance and Sexual Activity    Alcohol use: Not Currently    Drug use: No    Sexual activity: Yes     Partners: Male   Other Topics Concern    None   Social History Narrative    Hx of recent travel      Social Determinants of Health     Financial Resource Strain:     Difficulty of Paying Living Expenses:    Food Insecurity:     Worried About Running Out of Food in the Last Year:     Ran Out of Food in the Last Year:    Transportation Needs:     Lack of Transportation (Medical):      Lack of Transportation (Non-Medical):    Physical Activity:     Days of Exercise per Week:     Minutes of Exercise per Session:    Stress:     Feeling of Stress :    Social Connections:     Frequency of Communication with Friends and Family:     Frequency of Social Gatherings with Friends and Family:     Attends Confucianist Services:     Active Member of Clubs or Organizations:     Attends Club or Organization Meetings:     Marital Status:    Intimate Partner Violence:     Fear of Current or Ex-Partner:     Emotionally Abused:     Physically Abused:     Sexually Abused:       Family History:     Family History   Problem Relation Age of Onset    Asthma Mother     Depression Mother     Migraines Mother     Diabetes type II Mother     Other Mother         vertigo    Diabetes Maternal Aunt     Hypertension Maternal Aunt     Diabetes Maternal Grandmother     Hypertension Maternal Grandmother     Breast cancer Maternal Grandmother     Migraines Father     Heart murmur Brother     Lung cancer Paternal Grandmother     Stroke Paternal Grandmother       Current Medications:     Current Outpatient Medications   Medication Sig Dispense Refill    Bacillus Coagulans-Inulin (Probiotic) 1-250 BILLION-MG CAPS Take by mouth      pantoprazole (PROTONIX) 40 mg tablet Take 1 tablet (40 mg total) by mouth daily 30 tablet 0     No current facility-administered medications for this visit  Allergies: Allergies   Allergen Reactions    Flagyl [Metronidazole] Nausea Only    Macrobid [Nitrofurantoin] Nausea Only      Physical Exam:     /70   Pulse 72   Resp 16   Ht 5' 2" (1 575 m)   Wt 74 4 kg (164 lb)   SpO2 99%   BMI 30 00 kg/m²     Physical Exam  Vitals and nursing note reviewed  Constitutional:       General: She is not in acute distress  Appearance: Normal appearance  She is not ill-appearing  HENT:      Head: Normocephalic and atraumatic  Right Ear: Tympanic membrane and ear canal normal  There is no impacted cerumen  Left Ear: Tympanic membrane and ear canal normal  There is no impacted cerumen  Eyes:      General: No scleral icterus  Right eye: No discharge  Left eye: No discharge  Extraocular Movements: Extraocular movements intact  Conjunctiva/sclera: Conjunctivae normal       Pupils: Pupils are equal, round, and reactive to light  Cardiovascular:      Rate and Rhythm: Normal rate and regular rhythm  Heart sounds: Normal heart sounds  No murmur heard  Pulmonary:      Effort: Pulmonary effort is normal       Breath sounds: Normal breath sounds  No wheezing or rales  Abdominal:      General: Abdomen is flat  Bowel sounds are normal  There is no distension  Palpations: Abdomen is soft  There is no mass  Tenderness: There is no abdominal tenderness  Musculoskeletal:         General: No swelling, tenderness or deformity  Normal range of motion  Cervical back: Normal range of motion and neck supple  No muscular tenderness  Right lower leg: No edema  Left lower leg: No edema  Lymphadenopathy:      Cervical: No cervical adenopathy  Skin:     Coloration: Skin is not jaundiced or pale  Findings: No erythema, lesion or rash  Neurological:      General: No focal deficit present  Mental Status: She is alert and oriented to person, place, and time        Gait: Gait normal    Psychiatric:         Mood and Affect: Mood normal          Behavior: Behavior normal           Indra Jensen MD   [place] [id number]

## 2021-06-16 ENCOUNTER — OFFICE VISIT (OUTPATIENT)
Dept: GASTROENTEROLOGY | Facility: CLINIC | Age: 26
End: 2021-06-16
Payer: COMMERCIAL

## 2021-06-16 VITALS
DIASTOLIC BLOOD PRESSURE: 82 MMHG | BODY MASS INDEX: 29.81 KG/M2 | HEIGHT: 62 IN | HEART RATE: 86 BPM | SYSTOLIC BLOOD PRESSURE: 122 MMHG | WEIGHT: 162 LBS

## 2021-06-16 DIAGNOSIS — K21.9 GASTROESOPHAGEAL REFLUX DISEASE, UNSPECIFIED WHETHER ESOPHAGITIS PRESENT: ICD-10-CM

## 2021-06-16 DIAGNOSIS — R10.13 EPIGASTRIC PAIN: ICD-10-CM

## 2021-06-16 DIAGNOSIS — A04.8 H. PYLORI INFECTION: Primary | ICD-10-CM

## 2021-06-16 PROCEDURE — 99214 OFFICE O/P EST MOD 30 MIN: CPT | Performed by: PHYSICIAN ASSISTANT

## 2021-06-16 PROCEDURE — 3008F BODY MASS INDEX DOCD: CPT | Performed by: FAMILY MEDICINE

## 2021-06-16 PROCEDURE — 1036F TOBACCO NON-USER: CPT | Performed by: PHYSICIAN ASSISTANT

## 2021-06-16 NOTE — PROGRESS NOTES
[facility] - Outpatient Follow-up Note  George Schmid 31 y o  female MRN: [id number]  Encounter: [id number]          ASSESSMENT AND PLAN:      1  Gastroesophageal reflux disease  2  Epigastric pain  3  H  pylori infection    Patient presents with a return of epigastric discomfort, nausea, and reflux  She had an EGD in March of 2020 for these symptoms which showed mild gastritis and duodenitis  Biopsies were positive for h pylori  She was treated with Clarithromycin/Amoxicillin/PPI initially but failed treatment  Repeat stool h pylori in May of 2020 was positive  She was then treated with quadruple tx (Flagyl/Tetracycline/Bismuth/PPI)  Will check stool h pylori  If positive for h pylori, will plan for salvage h pylori treatment with [facility]  If she were to fail that regimen, would then need to repeat EGD with biopsies and antibiotic sensitivity testing for the h pylori  Will check abdominal ultrasound as well to evaluate the gallbladder  After she submits the stool h pylori sample, she will begin a Pantoprazole 40mg po daily course for GERD  GERD modifications reviewed - avoidance of trigger foods, reclining shortly after eating, etc were reviewed  Further recommendations pending the above results  ______________________________________________________________________    SUBJECTIVE:  Patient is a 31year old female who presents to the office for follow up  She presents with a return of epigastric discomfort, nausea, and reflux  She had an EGD in March of 2020 for these symptoms which showed mild gastritis and duodenitis  Biopsies were positive for h pylori  She was treated with Clarithromycin/Amoxicillin/PPI initially but failed treatment  Repeat stool h pylori in May of 2020 was positive  She was then treated with quadruple tx (Flagyl/Tetracycline/Bismuth/PPI)  She has not had a follow up stool h pylori test since that time    She reports it is hard for her to tolerate Flagyl  No vomiting  No blood in the stool  REVIEW OF SYSTEMS IS OTHERWISE NEGATIVE  Historical Information   Past Medical History:   Diagnosis Date    Anemia complicating pregnancy     Anxiety     Asthma     Cerebral concussion 3/17/2017    Group B Streptococcus carrier, antepartum 2017    Head injury     Herpes     Migraine     Postpartum depression     Secondary amenorrhea 2020    Varicella     vaccine     Past Surgical History:   Procedure Laterality Date    APPENDECTOMY       SECTION      INDUCED   2012 2020    X 2    [place]  DELIVERY ONLY N/A 2017    Procedure:  SECTION (); Surgeon: Emily Bruno DO;  Location: [facility];  Service: Obstetrics     Social History   Social History     Substance and Sexual Activity   Alcohol Use Not Currently     Social History     Substance and Sexual Activity   Drug Use No     Social History     Tobacco Use   Smoking Status Former Smoker   Smokeless Tobacco Never Used     Family History   Problem Relation Age of Onset    Asthma Mother     Depression Mother    Maxwell Pert Migraines Mother     Diabetes type II Mother     Other Mother         vertigo    Diabetes Maternal Aunt     Hypertension Maternal Aunt     Diabetes Maternal Grandmother     Hypertension Maternal Grandmother     Breast cancer Maternal Grandmother     Migraines Father     Heart murmur Brother     Lung cancer Paternal Grandmother     Stroke Paternal Grandmother        Meds/Allergies       Current Outpatient Medications:     Bacillus Coagulans-Inulin (Probiotic) 1-250 BILLION-MG CAPS    pantoprazole (PROTONIX) 40 mg tablet    Allergies   Allergen Reactions    Flagyl [Metronidazole] Nausea Only    Macrobid [Nitrofurantoin] Nausea Only           Objective     Blood pressure 122/82, pulse 86, height 5' 2" (1 575 m), weight 73 5 kg (162 lb), unknown if currently breastfeeding   Body mass index is 29 63 kg/m²       PHYSICAL EXAM:      General Appearance:   Alert, cooperative, no distress   HEENT:   Normocephalic, atraumatic, anicteric      Neck:  Supple, symmetrical, trachea midline   Lungs:   Clear to auscultation bilaterally; no rales, rhonchi or wheezing; respirations unlabored    Heart[de-identified]   Regular rate and rhythm; no murmur, rub, or gallop  Abdomen:   Soft, non-tender, non-distended; normal bowel sounds; no masses, no organomegaly    Genitalia:   Deferred    Rectal:   Deferred    Extremities:  No cyanosis, clubbing or edema    Pulses:  2+ and symmetric    Skin:  No jaundice, rashes, or lesions    Lymph nodes:  No palpable cervical lymphadenopathy        Lab Results:   No visits with results within 1 Day(s) from this visit     Latest known visit with results is:   Appointment on 05/10/2021   Component Date Value    WBC 05/10/2021 6 46     RBC 05/10/2021 4 52     Hemoglobin 05/10/2021 11 4*    Hematocrit 05/10/2021 38 2     MCV 05/10/2021 85     MCH 05/10/2021 25 2*    MCHC 05/10/2021 29 8*    RDW 05/10/2021 14 1     MPV 05/10/2021 11 1     Platelets 54/67/7855 319     nRBC 05/10/2021 0     Neutrophils Relative 05/10/2021 50     Immat GRANS % 05/10/2021 0     Lymphocytes Relative 05/10/2021 35     Monocytes Relative 05/10/2021 10     Eosinophils Relative 05/10/2021 4     Basophils Relative 05/10/2021 1     Neutrophils Absolute 05/10/2021 3 17     Immature Grans Absolute 05/10/2021 0 02     Lymphocytes Absolute 05/10/2021 2 29     Monocytes Absolute 05/10/2021 0 67     Eosinophils Absolute 05/10/2021 0 25     Basophils Absolute 05/10/2021 0 06     Sodium 05/10/2021 137     Potassium 05/10/2021 4 3     Chloride 05/10/2021 107     CO2 05/10/2021 26     ANION GAP 05/10/2021 4     BUN 05/10/2021 10     Creatinine 05/10/2021 0 47*    Glucose, Fasting 05/10/2021 84     Calcium 05/10/2021 8 7     AST 05/10/2021 13     ALT 05/10/2021 14     Alkaline Phosphatase 05/10/2021 56     Total Protein 05/10/2021 7 9     Albumin 05/10/2021 3 5     Total Bilirubin 05/10/2021 0 23     eGFR 05/10/2021 158     Cholesterol 05/10/2021 179     Triglycerides 05/10/2021 68     HDL, Direct 05/10/2021 47     LDL Calculated 05/10/2021 118*    Non-HDL-Chol (CHOL-HDL) 05/10/2021 132     Vit D, 25-Hydroxy 05/10/2021 8 9*         Radiology Results:   No results found

## 2021-06-17 ENCOUNTER — APPOINTMENT (OUTPATIENT)
Dept: LAB | Facility: CLINIC | Age: 26
End: 2021-06-17
Payer: COMMERCIAL

## 2021-06-17 DIAGNOSIS — A04.8 H. PYLORI INFECTION: ICD-10-CM

## 2021-06-17 PROCEDURE — 87338 HPYLORI STOOL AG IA: CPT

## 2021-06-18 ENCOUNTER — TELEPHONE (OUTPATIENT)
Dept: GASTROENTEROLOGY | Facility: CLINIC | Age: 26
End: 2021-06-18

## 2021-06-18 LAB — H PYLORI AG STL QL IA: POSITIVE

## 2021-06-21 NOTE — TELEPHONE ENCOUNTER
Please inform patient I sent the salvage h pylori treatment to the pharmacy - Rifabutin 300mg daily, Amoxicillin 750mg three times a day, and Pantoprazole twice a day x 14 days  Please let her know the Rifabutin can cause to body fluids to turn an orange color temporarily while on treatment  She will need a repeat stool h pylori test for cure in 2 months

## 2021-06-30 DIAGNOSIS — R11.0 NAUSEA: Primary | ICD-10-CM

## 2021-06-30 NOTE — TELEPHONE ENCOUNTER
Spoke with patient  History of Hpylori    Patient c/o small raise pimple like bumps, all even in size, itchy, and slight redness 5 days after starting her 3rd course of tmt for hpylori  She is on day 7/14 of amoxicillin, and rifabutin  She does nto have a rash on any other area of her body  She has not tried benadryl  Would you like her to continue medication?

## 2021-06-30 NOTE — TELEPHONE ENCOUNTER
Spoke with patient  She will continue her medication and follow instructions  Patient is asking for nausea medication  We will send this

## 2021-06-30 NOTE — TELEPHONE ENCOUNTER
Have her try benadryl or claritin with the regimen  This is salvage therapy and so there are not any other options for treatment    If worsening or develops sob/tongue swelling she should stop immediately - if sob/tongue swelling she should go to the er but this is unlikely to happen

## 2021-07-01 ENCOUNTER — APPOINTMENT (EMERGENCY)
Dept: ULTRASOUND IMAGING | Facility: HOSPITAL | Age: 26
End: 2021-07-01
Payer: COMMERCIAL

## 2021-07-01 ENCOUNTER — HOSPITAL ENCOUNTER (EMERGENCY)
Facility: HOSPITAL | Age: 26
Discharge: HOME | End: 2021-07-01
Attending: EMERGENCY MEDICINE | Admitting: EMERGENCY MEDICINE
Payer: COMMERCIAL

## 2021-07-01 VITALS
TEMPERATURE: 98.5 F | RESPIRATION RATE: 17 BRPM | BODY MASS INDEX: 29.21 KG/M2 | DIASTOLIC BLOOD PRESSURE: 64 MMHG | HEART RATE: 72 BPM | HEIGHT: 62 IN | OXYGEN SATURATION: 100 % | WEIGHT: 158.73 LBS | SYSTOLIC BLOOD PRESSURE: 118 MMHG

## 2021-07-01 DIAGNOSIS — R21 RASH AND NONSPECIFIC SKIN ERUPTION: Primary | ICD-10-CM

## 2021-07-01 DIAGNOSIS — R10.9 ABDOMINAL PAIN: ICD-10-CM

## 2021-07-01 LAB
ALBUMIN SERPL BCP-MCNC: 3.5 G/DL (ref 3.5–5)
ALP SERPL-CCNC: 56 U/L (ref 46–116)
ALT SERPL W P-5'-P-CCNC: 18 U/L (ref 12–78)
ANION GAP SERPL CALCULATED.3IONS-SCNC: 8 MMOL/L (ref 4–13)
AST SERPL W P-5'-P-CCNC: 16 U/L (ref 5–45)
BASOPHILS # BLD AUTO: 0.02 THOUSANDS/ΜL (ref 0–0.1)
BASOPHILS NFR BLD AUTO: 0 % (ref 0–1)
BILIRUB SERPL-MCNC: 0.28 MG/DL (ref 0.2–1)
BUN SERPL-MCNC: 8 MG/DL (ref 5–25)
CALCIUM SERPL-MCNC: 8.8 MG/DL (ref 8.3–10.1)
CHLORIDE SERPL-SCNC: 103 MMOL/L (ref 100–108)
CO2 SERPL-SCNC: 27 MMOL/L (ref 21–32)
CREAT SERPL-MCNC: 0.6 MG/DL (ref 0.6–1.3)
EOSINOPHIL # BLD AUTO: 0.07 THOUSAND/ΜL (ref 0–0.61)
EOSINOPHIL NFR BLD AUTO: 2 % (ref 0–6)
ERYTHROCYTE [DISTWIDTH] IN BLOOD BY AUTOMATED COUNT: 15 % (ref 11.6–15.1)
GFR SERPL CREATININE-BSD FRML MDRD: 145 ML/MIN/1.73SQ M
GLUCOSE SERPL-MCNC: 77 MG/DL (ref 65–140)
HCT VFR BLD AUTO: 37.8 % (ref 34.8–46.1)
HGB BLD-MCNC: 11.5 G/DL (ref 11.5–15.4)
IMM GRANULOCYTES # BLD AUTO: 0 THOUSAND/UL (ref 0–0.2)
IMM GRANULOCYTES NFR BLD AUTO: 0 % (ref 0–2)
LIPASE SERPL-CCNC: 28 U/L (ref 73–393)
LYMPHOCYTES # BLD AUTO: 1.49 THOUSANDS/ΜL (ref 0.6–4.47)
LYMPHOCYTES NFR BLD AUTO: 33 % (ref 14–44)
MCH RBC QN AUTO: 24.8 PG (ref 26.8–34.3)
MCHC RBC AUTO-ENTMCNC: 30.4 G/DL (ref 31.4–37.4)
MCV RBC AUTO: 82 FL (ref 82–98)
MONOCYTES # BLD AUTO: 0.53 THOUSAND/ΜL (ref 0.17–1.22)
MONOCYTES NFR BLD AUTO: 12 % (ref 4–12)
NEUTROPHILS # BLD AUTO: 2.48 THOUSANDS/ΜL (ref 1.85–7.62)
NEUTS SEG NFR BLD AUTO: 53 % (ref 43–75)
NRBC BLD AUTO-RTO: 0 /100 WBCS
PLATELET # BLD AUTO: 223 THOUSANDS/UL (ref 149–390)
PMV BLD AUTO: 11.7 FL (ref 8.9–12.7)
POTASSIUM SERPL-SCNC: 4.2 MMOL/L (ref 3.5–5.3)
PROT SERPL-MCNC: 8.1 G/DL (ref 6.4–8.2)
RBC # BLD AUTO: 4.63 MILLION/UL (ref 3.81–5.12)
SODIUM SERPL-SCNC: 138 MMOL/L (ref 136–145)
WBC # BLD AUTO: 4.59 THOUSAND/UL (ref 4.31–10.16)

## 2021-07-01 PROCEDURE — 83690 ASSAY OF LIPASE: CPT | Performed by: PHYSICIAN ASSISTANT

## 2021-07-01 PROCEDURE — 99284 EMERGENCY DEPT VISIT MOD MDM: CPT | Performed by: PHYSICIAN ASSISTANT

## 2021-07-01 PROCEDURE — 80053 COMPREHEN METABOLIC PANEL: CPT | Performed by: PHYSICIAN ASSISTANT

## 2021-07-01 PROCEDURE — 36415 COLL VENOUS BLD VENIPUNCTURE: CPT | Performed by: PHYSICIAN ASSISTANT

## 2021-07-01 PROCEDURE — 76705 ECHO EXAM OF ABDOMEN: CPT

## 2021-07-01 PROCEDURE — 85025 COMPLETE CBC W/AUTO DIFF WBC: CPT | Performed by: PHYSICIAN ASSISTANT

## 2021-07-01 PROCEDURE — 99284 EMERGENCY DEPT VISIT MOD MDM: CPT

## 2021-07-01 RX ORDER — ONDANSETRON 4 MG/1
4 TABLET, ORALLY DISINTEGRATING ORAL ONCE
Status: COMPLETED | OUTPATIENT
Start: 2021-07-01 | End: 2021-07-01

## 2021-07-01 RX ORDER — ONDANSETRON 4 MG/1
4 TABLET, ORALLY DISINTEGRATING ORAL EVERY 8 HOURS PRN
Qty: 15 TABLET | Refills: 0 | Status: SHIPPED | OUTPATIENT
Start: 2021-07-01 | End: 2021-12-06 | Stop reason: ALTCHOICE

## 2021-07-01 RX ORDER — ONDANSETRON 4 MG/1
4 TABLET, FILM COATED ORAL EVERY 8 HOURS PRN
Qty: 20 TABLET | Refills: 0 | Status: SHIPPED | OUTPATIENT
Start: 2021-07-01 | End: 2021-08-27 | Stop reason: ALTCHOICE

## 2021-07-01 RX ADMIN — ONDANSETRON 4 MG: 4 TABLET, ORALLY DISINTEGRATING ORAL at 15:11

## 2021-07-01 NOTE — ED PROVIDER NOTES
History  Chief Complaint   Patient presents with    Rash     pt reports raised bumps on bilateral hands and feet that started two days ago   Abdominal Pain     pt c/o abdomnial pain and states feeling "sick to my stomach"  pt currently being treated for H  pylori     30 yo female patient here for evaluation of rash and abd pain    abd pain has been ongoing and follows with GI for this issue  Recently started treatment for H pylori and has a gallbladder US pending  She has intermittent nausea  No vomiting  No chest pain or sob  Normal bowel movements and normal urination  No fever or chills  She also has rash on b/l hands and feet  Started 2 days ago  Described as bumps  Burning  No fever  No contacts with similar rash  No recent illness  History provided by:  Patient   used: No    Rash  Location: b/l hands and feet   Quality: burning    Severity:  Mild  Onset quality:  Gradual  Duration:  2 days  Timing:  Constant  Progression:  Worsening  Chronicity:  New  Relieved by:  Nothing  Worsened by:  Nothing  Ineffective treatments:  None tried  Associated symptoms: abdominal pain and nausea    Associated symptoms: no fever, no joint pain, no shortness of breath, no sore throat and not vomiting    Abdominal Pain  Pain location:  RUQ  Pain quality: aching    Pain radiates to:  Does not radiate  Pain severity:  Moderate  Onset quality:  Gradual  Duration:  1 week  Timing:  Intermittent  Progression:  Unchanged  Chronicity:  New  Relieved by:  Nothing  Worsened by:  Nothing  Ineffective treatments:  None tried  Associated symptoms: nausea    Associated symptoms: no chest pain, no chills, no cough, no dysuria, no fever, no hematuria, no shortness of breath, no sore throat and no vomiting        Prior to Admission Medications   Prescriptions Last Dose Informant Patient Reported? Taking?    Bacillus Coagulans-Inulin (Probiotic) 1-250 BILLION-MG CAPS  Self Yes No   Sig: Take by mouth   amoxicillin (AMOXIL) 250 mg capsule   No Yes   Sig: Take 3 capsules (750 mg total) by mouth every 8 (eight) hours for 14 days   ondansetron (ZOFRAN) 4 mg tablet   No No   Sig: Take 1 tablet (4 mg total) by mouth every 8 (eight) hours as needed for nausea or vomiting   pantoprazole (PROTONIX) 40 mg tablet  Self No No   Sig: Take 1 tablet (40 mg total) by mouth daily   pantoprazole (PROTONIX) 40 mg tablet   No No   Sig: Take 1 tablet (40 mg total) by mouth 2 (two) times a day for 14 days   rifabutin (MYCOBUTIN) 150 mg capsule   No Yes   Sig: Take 2 capsules (300 mg total) by mouth daily for 14 days      Facility-Administered Medications: None       Past Medical History:   Diagnosis Date    Anemia complicating pregnancy 3623    Anxiety     Asthma     Cerebral concussion 3/17/2017    Group B Streptococcus carrier, antepartum 2017    Head injury     Herpes     Migraine     Postpartum depression     Secondary amenorrhea 2020    Varicella     vaccine       Past Surgical History:   Procedure Laterality Date    APPENDECTOMY       SECTION      INDUCED   2012 2020    X 2    [place]  DELIVERY ONLY N/A 2017    Procedure:  SECTION (); Surgeon: Benoit Estrada DO;  [facility]: [facility] ;  Service: Obstetrics       Family History   Problem Relation Age of Onset    Asthma Mother     Depression Mother    [de-identified] Migraines Mother     Diabetes type II Mother     Other Mother         vertigo    Diabetes Maternal Aunt     Hypertension Maternal Aunt     Diabetes Maternal Grandmother     Hypertension Maternal Grandmother     Breast cancer Maternal Grandmother     Migraines Father     Heart murmur Brother     Lung cancer Paternal Grandmother     Stroke Paternal Grandmother      I have reviewed and agree with the history as documented      E-Cigarette/Vaping    E-Cigarette Use Never User      E-Cigarette/Vaping Substances    Nicotine No     THC No     CBD No     Flavoring No  Other No     Unknown No      Social History     Tobacco Use    Smoking status: Former Smoker    Smokeless tobacco: Never Used   Vaping Use    Vaping Use: Never used   Substance Use Topics    Alcohol use: Not Currently    Drug use: No       Review of Systems   Constitutional: Negative for chills and fever  HENT: Negative for ear pain and sore throat  Eyes: Negative for pain and visual disturbance  Respiratory: Negative for cough and shortness of breath  Cardiovascular: Negative for chest pain and palpitations  Gastrointestinal: Positive for abdominal pain and nausea  Negative for vomiting  Genitourinary: Negative for dysuria and hematuria  Musculoskeletal: Negative for arthralgias and back pain  Skin: Negative for color change and rash  Neurological: Negative for seizures and syncope  All other systems reviewed and are negative  Physical Exam  Physical Exam  Vitals and nursing note reviewed  Constitutional:       General: She is not in acute distress  Appearance: She is well-developed  HENT:      Head: Normocephalic and atraumatic  Eyes:      Conjunctiva/sclera: Conjunctivae normal    Cardiovascular:      Rate and Rhythm: Normal rate and regular rhythm  Heart sounds: No murmur heard  Pulmonary:      Effort: Pulmonary effort is normal  No respiratory distress  Breath sounds: Normal breath sounds  Abdominal:      Palpations: Abdomen is soft  Tenderness: There is abdominal tenderness in the right upper quadrant and epigastric area  Musculoskeletal:      Cervical back: Neck supple  Skin:     General: Skin is warm and dry  Findings: Rash present  Rash is papular  Comments: Along the dorsum of b/l hands and top of b/l feet there is a faint papular rash  No erythema or warmth  No tenderness  No abscess  No vesicles or pustules  Neurological:      Mental Status: She is alert           Vital Signs  ED Triage Vitals [07/01/21 1441] Temperature Pulse Respirations Blood Pressure SpO2   98 5 °F (36 9 °C) 79 18 108/70 100 %      Temp Source Heart Rate Source Patient Position - Orthostatic VS BP Location FiO2 (%)   Oral Monitor Sitting Left arm --      Pain Score       8           Vitals:    07/01/21 1441 07/01/21 1500 07/01/21 1630   BP: 108/70 119/67 118/64   Pulse: 79 78 72   Patient Position - Orthostatic VS: Sitting           Visual Acuity      ED Medications  Medications   ondansetron (ZOFRAN-ODT) dispersible tablet 4 mg (4 mg Oral Given 7/1/21 1511)       Diagnostic Studies  Results Reviewed     Procedure Component Value Units Date/Time    Lipase [[id number]]  (Abnormal) Collected: 07/01/21 1513    Lab Status: Final result Specimen: Blood from Arm, Left Updated: 07/01/21 1536     Lipase 28 u/L     Comprehensive metabolic panel [[id number]] Collected: 07/01/21 1513    Lab Status: Final result Specimen: Blood from Arm, Left Updated: 07/01/21 1536     Sodium 138 mmol/L      Potassium 4 2 mmol/L      Chloride 103 mmol/L      CO2 27 mmol/L      ANION GAP 8 mmol/L      BUN 8 mg/dL      Creatinine 0 60 mg/dL      Glucose 77 mg/dL      Calcium 8 8 mg/dL      AST 16 U/L      ALT 18 U/L      Alkaline Phosphatase 56 U/L      Total Protein 8 1 g/dL      Albumin 3 5 g/dL      Total Bilirubin 0 28 mg/dL      eGFR 145 ml/min/1 73sq m     Narrative:      [place] guidelines for Chronic Kidney Disease (CKD):     Stage 1 with normal or high GFR (GFR > 90 mL/min/1 73 square meters)    Stage 2 Mild CKD (GFR = 60-89 mL/min/1 73 square meters)    Stage 3A Moderate CKD (GFR = 45-59 mL/min/1 73 square meters)    Stage 3B Moderate CKD (GFR = 30-44 mL/min/1 73 square meters)    Stage 4 Severe CKD (GFR = 15-29 mL/min/1 73 square meters)    Stage 5 End Stage CKD (GFR <15 mL/min/1 73 square meters)  Note: GFR calculation is accurate only with a steady state creatinine    CBC and differential [[id number]]  (Abnormal) Collected: 07/01/21 1513    Lab Status: Final result Specimen: Blood from Arm, Left Updated: 07/01/21 1522     WBC 4 59 Thousand/uL      RBC 4 63 Million/uL      Hemoglobin 11 5 g/dL      Hematocrit 37 8 %      MCV 82 fL      MCH 24 8 pg      MCHC 30 4 g/dL      RDW 15 0 %      MPV 11 7 fL      Platelets 542 Thousands/uL      nRBC 0 /100 WBCs      Neutrophils Relative 53 %      Immat GRANS % 0 %      Lymphocytes Relative 33 %      Monocytes Relative 12 %      Eosinophils Relative 2 %      Basophils Relative 0 %      Neutrophils Absolute 2 48 Thousands/µL      Immature Grans Absolute 0 00 Thousand/uL      Lymphocytes Absolute 1 49 Thousands/µL      Monocytes Absolute 0 53 Thousand/µL      Eosinophils Absolute 0 07 Thousand/µL      Basophils Absolute 0 02 Thousands/µL                  US gallbladder   Final Result by Jenny Riggs MD (07/01 1635)      No acute sonographic findings  Workstation performed: [id number]                    Procedures  Procedures         ED Course                             SBIRT 20yo+      Most Recent Value   SBIRT (23 yo +)   In order to provide better care to our patients, we are screening all of our patients for alcohol and drug use  Would it be okay to ask you these screening questions? Yes Filed at: 07/01/2021 1522   Initial Alcohol Screen: US AUDIT-C    1  How often do you have a drink containing alcohol?  0 Filed at: 07/01/2021 1522   2  How many drinks containing alcohol do you have on a typical day you are drinking? 0 Filed at: 07/01/2021 1522   3b  FEMALE Any Age, or MALE 65+: How often do you have 4 or more drinks on one occassion? 0 Filed at: 07/01/2021 1522   Audit-C Score  0 Filed at: 07/01/2021 1522   DORY: How many times in the past year have you    Used an illegal drug or used a prescription medication for non-medical reasons?   Never Filed at: 07/01/2021 1522                    MDM  Number of Diagnoses or Management Options  Abdominal pain: new and requires workup  Rash and nonspecific skin eruption: new and requires workup  Diagnosis management comments: DDX including but not limited to: allergic reaction, urticaria, cellulitis, contact dermatitis, allergic dermatitis, poison ivy/oak/sumac, vasculitis, herpes zoster, infectious etiology, bullous pemphigus, scabies; doubt staph scalded skin syndrome or Cadence Rebel syndrome  Ddx for abd pain includes but is not limited to gastritis, GERD, PUD, h pylori, cholecystitis, cholelithiasis, pancreatitis  Plan: labs  US gall bladder  Rash nonspecific  Could be viral could be heat rash  Amount and/or Complexity of Data Reviewed  Clinical lab tests: ordered and reviewed  Tests in the radiology section of CPT®: ordered and reviewed  Independent visualization of images, tracings, or specimens: yes    Risk of Complications, Morbidity, and/or Mortality  Presenting problems: moderate  Management options: low  General comments: 30 yo with rash and abd pain  abd pain likely related to her known h pylori gastritis  Her US is negative  Labs unremarkable  In regards to rash, it appears nonspecific  Could be heat rash  Benadryl prn itching  F/u with PCP  Return parameters provided  Pt understands and agrees with plan  Patient Progress  Patient progress: stable      Disposition  Final diagnoses:   Rash and nonspecific skin eruption   Abdominal pain     Time reflects when diagnosis was documented in both MDM as applicable and the Disposition within this note     Time User Action Codes Description Comment    7/1/2021  4:41 PM Dyane Bradley L Add [R21] Rash     7/1/2021  4:41 PM All Maldonado Remove [R21] Rash     7/1/2021  4:41 PM Dyane Bradley L Add [R21] Rash and nonspecific skin eruption     7/1/2021  4:41 PM Veto Liner Add [R10 9] Abdominal pain       ED Disposition     ED Disposition Condition Date/Time Comment    Discharge Stable Thu Jul 1, 2021  4:41 PM Tahmina Stage Mingle discharge to home/self care              Follow-up Information     Follow up With Specialties Details Why Contact Info Additional Information    Claribel Morin MD [facility] Medicine Call   [address]  [place] [address]  [phone]       [facility]   [facility] [address] [id number] [address] [facility] [place] [facility], [address] [facility], [place] Level, [place], [place], [address] [facility]          Discharge Medication List as of 7/1/2021  4:42 PM      START taking these medications    Details   ondansetron (ZOFRAN-ODT) 4 mg disintegrating tablet Take 1 tablet (4 mg total) by mouth every 8 (eight) hours as needed for nausea, Starting Thu 7/1/2021, Print         CONTINUE these medications which have NOT CHANGED    Details   amoxicillin (AMOXIL) 250 mg capsule Take 3 capsules (750 mg total) by mouth every 8 (eight) hours for 14 days, Starting Mon 6/21/2021, Until Mon 7/5/2021, Normal      rifabutin (MYCOBUTIN) 150 mg capsule Take 2 capsules (300 mg total) by mouth daily for 14 days, Starting Mon 6/21/2021, Until Mon 7/5/2021, Normal      Bacillus Coagulans-Inulin (Probiotic) 1-250 BILLION-MG CAPS Take by mouth, Historical Med      ondansetron (ZOFRAN) 4 mg tablet Take 1 tablet (4 mg total) by mouth every 8 (eight) hours as needed for nausea or vomiting, Starting Thu 7/1/2021, Normal      !! pantoprazole (PROTONIX) 40 mg tablet Take 1 tablet (40 mg total) by mouth daily, Starting Tue 6/15/2021, Normal      !! pantoprazole (PROTONIX) 40 mg tablet Take 1 tablet (40 mg total) by mouth 2 (two) times a day for 14 days, Starting Mon 6/21/2021, Until Mon 7/5/2021, Normal       !! - Potential duplicate medications found  Please discuss with provider  No discharge procedures on file      PDMP Review     None          ED Provider  Electronically Signed by           Tracey Guzman PA-C  07/03/21 8018

## 2021-07-27 ENCOUNTER — TELEPHONE (OUTPATIENT)
Dept: NEUROLOGY | Facility: CLINIC | Age: 26
End: 2021-07-27

## 2021-08-02 ENCOUNTER — TELEPHONE (OUTPATIENT)
Dept: FAMILY MEDICINE CLINIC | Facility: CLINIC | Age: 26
End: 2021-08-02

## 2021-08-02 DIAGNOSIS — G43.809 OTHER MIGRAINE WITHOUT STATUS MIGRAINOSUS, NOT INTRACTABLE: ICD-10-CM

## 2021-08-02 DIAGNOSIS — G43.009 MIGRAINE WITHOUT AURA AND WITHOUT STATUS MIGRAINOSUS, NOT INTRACTABLE: Primary | ICD-10-CM

## 2021-08-02 NOTE — TELEPHONE ENCOUNTER
BODØ called in requesting an order be put into [facility] for the patient for neuro  DX cod [id number]   She is scheduled on 8/10/2021

## 2021-08-10 ENCOUNTER — CONSULT (OUTPATIENT)
Dept: NEUROLOGY | Facility: CLINIC | Age: 26
End: 2021-08-10
Payer: COMMERCIAL

## 2021-08-10 VITALS
HEIGHT: 62 IN | HEART RATE: 66 BPM | BODY MASS INDEX: 30 KG/M2 | TEMPERATURE: 98.7 F | SYSTOLIC BLOOD PRESSURE: 108 MMHG | WEIGHT: 163 LBS | DIASTOLIC BLOOD PRESSURE: 72 MMHG

## 2021-08-10 DIAGNOSIS — G43.009 MIGRAINE WITHOUT AURA AND WITHOUT STATUS MIGRAINOSUS, NOT INTRACTABLE: ICD-10-CM

## 2021-08-10 PROCEDURE — 99244 OFF/OP CNSLTJ NEW/EST MOD 40: CPT | Performed by: PSYCHIATRY & NEUROLOGY

## 2021-08-10 PROCEDURE — 3008F BODY MASS INDEX DOCD: CPT | Performed by: PHYSICIAN ASSISTANT

## 2021-08-10 RX ORDER — SUMATRIPTAN 100 MG/1
TABLET, FILM COATED ORAL
Qty: 9 TABLET | Refills: 5 | Status: SHIPPED | OUTPATIENT
Start: 2021-08-10 | End: 2021-08-27 | Stop reason: ALTCHOICE

## 2021-08-10 NOTE — LETTER
, as does caffeine  She denies any aura associated with the headache  She reports that her father has a history of bad headaches  She states that she has headaches 2 to 3 times a week and at times they are debilitating  She states she has never been evaluated for these migraine headaches before  She was seen in this office about 4 years ago following a head injury and had an MRI of her brain done around that time which was unremarkable  She has not noted any change in her headache frequency  She states that when she gets a headache she will take Tylenol but this does not usually get rid of the headache      Past Medical History:   Diagnosis Date    Anemia complicating pregnancy     Anxiety     Asthma     Cerebral concussion 3/17/2017    Group B Streptococcus carrier, antepartum 2017    Head injury     Herpes     Migraine     Postpartum depression     Secondary amenorrhea 2020    Varicella     vaccine       Family History:  Family History   Problem Relation Age of Onset    Asthma Mother     Depression Mother     Migraines Mother     Diabetes type II Mother     Other Mother         vertigo    Diabetes Maternal Aunt     Hypertension Maternal Aunt     Diabetes Maternal Grandmother     Hypertension Maternal Grandmother     Breast cancer Maternal Grandmother     Migraines Father     Heart murmur Brother     Lung cancer Paternal Grandmother     Stroke Paternal Grandmother        Past Surgical History:  Past Surgical History:   Procedure Laterality Date    APPENDECTOMY       SECTION      INDUCED   2012 2020    X 2    [place]  DELIVERY ONLY N/A 2017    Procedure:  SECTION (); Surgeon: Ambrose Braswell DO;  Location: [facility] ;  Service: Obstetrics       Social History:   reports that she has quit smoking  She has never used smokeless tobacco  She reports previous alcohol use   She reports that she does not use drugs  Allergies:  Flagyl [metronidazole] and Macrobid [nitrofurantoin]      Current Outpatient Medications:     Bacillus Coagulans-Inulin (Probiotic) 1-250 BILLION-MG CAPS, Take by mouth, Disp: , Rfl:     ondansetron (ZOFRAN) 4 mg tablet, Take 1 tablet (4 mg total) by mouth every 8 (eight) hours as needed for nausea or vomiting (Patient not taking: Reported on 8/10/2021), Disp: 20 tablet, Rfl: 0    ondansetron (ZOFRAN-ODT) 4 mg disintegrating tablet, Take 1 tablet (4 mg total) by mouth every 8 (eight) hours as needed for nausea (Patient not taking: Reported on 8/10/2021), Disp: 15 tablet, Rfl: 0    pantoprazole (PROTONIX) 40 mg tablet, Take 1 tablet (40 mg total) by mouth daily (Patient not taking: Reported on 8/10/2021), Disp: 30 tablet, Rfl: 0    pantoprazole (PROTONIX) 40 mg tablet, Take 1 tablet (40 mg total) by mouth 2 (two) times a day for 14 days, Disp: 28 tablet, Rfl: 0    rifabutin (MYCOBUTIN) 150 mg capsule, Take 2 capsules (300 mg total) by mouth daily for 14 days, Disp: 28 capsule, Rfl: 0     I have reviewed the past medical, social and family history, current medications, allergies, vitals, review of systems and updated this information as appropriate today     Objective:    Vitals:  Blood pressure 108/72, pulse 66, temperature 98 7 °F (37 1 °C), temperature source Tympanic, height 5' 2" (1 575 m), weight 73 9 kg (163 lb), unknown if currently breastfeeding  Physical Exam    Neurological Exam    GENERAL:  Cooperative in no acute distress  Well-developed and well-nourished    HEAD and NECK   Head is atraumatic normocephalic with no lesions or masses  Neck is supple with full range of motion    CARDIOVASCULAR  Carotid Arteries-no carotid bruits  NEUROLOGIC:  Mental Status-the patient is awake alert and oriented without aphasia or apraxia  Cranial Nerves: Visual fields are full to confrontation  Discs are flat  Extraocular movements are full without nystagmus   Pupils are 2-1/2 mm and reactive  Face is symmetrical to light touch , other than subtle decreased sensation in the left lower cheek relative to the right  Movements of facial expression move symmetrically  Hearing is normal to finger rub bilaterally  Soft palate lifts symmetrically  Shoulder shrug is symmetrical  Tongue is midline without atrophy  Motor: No drift is noted on arm extension  Strength is full in the upper and lower extremities with normal bulk and tone  Sensory: Intact to temperature and vibratory sensation in the upper and lower extremities bilaterally  Cortical function is intact  Coordination: Finger to nose testing is performed accurately  Romberg is negative  Gait reveals a normal base with symmetrical arm swing  Tandem walk is normal   Reflexes:  Trace to 1 in the biceps, triceps, brachioradialis, knee jerk and ankle jerk regions  Toes are downgoing            ROS:    Review of Systems   Constitutional: Negative  Negative for appetite change and fever  HENT: Negative  Negative for hearing loss, tinnitus, trouble swallowing and voice change  Eyes: Positive for photophobia and pain  Respiratory: Negative  Negative for shortness of breath  Cardiovascular: Negative  Negative for palpitations  Gastrointestinal: Negative  Negative for nausea and vomiting  Endocrine: Negative  Negative for cold intolerance  Genitourinary: Negative  Negative for dysuria, frequency and urgency  Musculoskeletal: Negative  Negative for myalgias and neck pain  Skin: Negative  Negative for rash  Neurological: Positive for dizziness, weakness, light-headedness and headaches  Negative for tremors, seizures, syncope, facial asymmetry, speech difficulty and numbness  Hematological: Negative  Does not bruise/bleed easily  Psychiatric/Behavioral: Positive for sleep disturbance  Negative for confusion and hallucinations  The patient is nervous/anxious                      Robert Smith MD  8/10/2021 12:28 PM Cristi Haro is a 31 y o  female [ ]    Assessment:  1  Other migraine without status migrainosus, not intractable        Plan:  [ ]    Discussion:  [ ]      Subjective:    HPI  [ ]      Past Medical History:   Diagnosis Date    Anemia complicating pregnancy     Anxiety     Asthma     Cerebral concussion 3/17/2017    Group B Streptococcus carrier, antepartum 2017    Head injury     Herpes     Migraine     Postpartum depression     Secondary amenorrhea 2020    Varicella     vaccine       Family History:  Family History   Problem Relation Age of Onset    Asthma Mother     Depression Mother    [facility] Migraines Mother     Diabetes type II Mother     Other Mother         vertigo    Diabetes Maternal Aunt     Hypertension Maternal Aunt     Diabetes Maternal Grandmother     Hypertension Maternal Grandmother     Breast cancer Maternal Grandmother     Migraines Father     Heart murmur Brother     Lung cancer Paternal Grandmother     Stroke Paternal Grandmother        Past Surgical History:  Past Surgical History:   Procedure Laterality Date    APPENDECTOMY       SECTION      INDUCED   2012 2020    X 2    [place]  DELIVERY ONLY N/A 2017    Procedure:  SECTION (); Surgeon: Rea Saleem DO;  Location: [facility];  Service: Obstetrics       Social History:   reports that she has quit smoking  She has never used smokeless tobacco  She reports previous alcohol use  She reports that she does not use drugs      Allergies:  Flagyl [metronidazole] and Macrobid [nitrofurantoin]      Current Outpatient Medications:     Bacillus Coagulans-Inulin (Probiotic) 1-250 BILLION-MG CAPS, Take by mouth, Disp: , Rfl:     ondansetron (ZOFRAN) 4 mg tablet, Take 1 tablet (4 mg total) by mouth every 8 (eight) hours as needed for nausea or vomiting (Patient not taking: Reported on 8/10/2021), Disp: 20 tablet, Rfl: 0    ondansetron (ZOFRAN-ODT) 4 mg disintegrating tablet, Take 1 tablet (4 mg total) by mouth every 8 (eight) hours as needed for nausea (Patient not taking: Reported on 8/10/2021), Disp: 15 tablet, Rfl: 0    pantoprazole (PROTONIX) 40 mg tablet, Take 1 tablet (40 mg total) by mouth daily (Patient not taking: Reported on 8/10/2021), Disp: 30 tablet, Rfl: 0    pantoprazole (PROTONIX) 40 mg tablet, Take 1 tablet (40 mg total) by mouth 2 (two) times a day for 14 days, Disp: 28 tablet, Rfl: 0    rifabutin (MYCOBUTIN) 150 mg capsule, Take 2 capsules (300 mg total) by mouth daily for 14 days, Disp: 28 capsule, Rfl: 0    [  ]     Objective:    Vitals:  Blood pressure 108/72, pulse 66, temperature 98 7 °F (37 1 °C), temperature source Tympanic, height 5' 2" (1 575 m), weight 73 9 kg (163 lb), unknown if currently breastfeeding  Physical Exam    Neurological Exam  [ ]      ROS:    Review of Systems   Constitutional: Negative  Negative for appetite change and fever  HENT: Negative  Negative for hearing loss, tinnitus, trouble swallowing and voice change  Eyes: Positive for photophobia and pain  Respiratory: Negative  Negative for shortness of breath  Cardiovascular: Negative  Negative for palpitations  Gastrointestinal: Negative  Negative for nausea and vomiting  Endocrine: Negative  Negative for cold intolerance  Genitourinary: Negative  Negative for dysuria, frequency and urgency  Musculoskeletal: Negative  Negative for myalgias and neck pain  Skin: Negative  Negative for rash  Neurological: Positive for dizziness, weakness, light-headedness and headaches  Negative for tremors, seizures, syncope, facial asymmetry, speech difficulty and numbness  Hematological: Negative  Does not bruise/bleed easily  Psychiatric/Behavioral: Positive for sleep disturbance  Negative for confusion and hallucinations  The patient is nervous/anxious

## 2021-08-10 NOTE — PROGRESS NOTES
Daniella Finch is a 31 y o  female  Presents today with complaints of headache    Assessment:  1  Migraine without aura and without status migrainosus, not intractable        Plan:   headache calendar  Imitrex as needed   Follow-up 2 months    Discussion:  Terrell Haas has migraine headaches and a nonfocal neurologic examination  We discussed triggers for migraine and have recommended keeping a headache calendar  She has not found Tylenol to be effective in reducing headache severity and an MRI of the brain done a few years ago demonstrated no significant abnormalities that would explain her headaches  Have recommended a trial of Imitrex for breakthrough headaches and we discussed potential adverse effects as well as how to take the medication  If headache frequency persists consider a prophylactic approach  I will re-evaluate her in 2 months      Subjective:    HPI  Terrell Haas is a left-handed woman who presents with the above complaints  She states that since she was a child she has gotten bad headaches  She states her headaches sometimes lateralized to the right other times to the left  They are a pressure throbbing stabbing type pain associated with photophobia sonophobia and nausea  She states that she usually does not vomit when she has a headache  She has found she is more likely to get a headache around her menses and also finds that certain smells will trigger headache , as does caffeine  She denies any aura associated with the headache  She reports that her father has a history of bad headaches  She states that she has headaches 2 to 3 times a week and at times they are debilitating  She states she has never been evaluated for these migraine headaches before  She was seen in this office about 4 years ago following a head injury and had an MRI of her brain done around that time which was unremarkable  She has not noted any change in her headache frequency    She states that when she gets a headache she will take Tylenol but this does not usually get rid of the headache      Past Medical History:   Diagnosis Date    Anemia complicating pregnancy     Anxiety     Asthma     Cerebral concussion 3/17/2017    Group B Streptococcus carrier, antepartum 2017    Head injury     Herpes     Migraine     Postpartum depression     Secondary amenorrhea 2020    Varicella     vaccine       Family History:  Family History   Problem Relation Age of Onset    Asthma Mother     Depression Mother     Migraines Mother     Diabetes type II Mother     Other Mother         vertigo    Diabetes Maternal Aunt     Hypertension Maternal Aunt     Diabetes Maternal Grandmother     Hypertension Maternal Grandmother     Breast cancer Maternal Grandmother     Migraines Father     Heart murmur Brother     Lung cancer Paternal Grandmother     Stroke Paternal Grandmother        Past Surgical History:  Past Surgical History:   Procedure Laterality Date    APPENDECTOMY       SECTION      INDUCED   2012 2020    X 2    [place]  DELIVERY ONLY N/A 2017    Procedure:  SECTION (); Surgeon: Joseph Rollins DO;  Location: [facility] ;  Service: Obstetrics       Social History:   reports that she has quit smoking  She has never used smokeless tobacco  She reports previous alcohol use  She reports that she does not use drugs      Allergies:  Flagyl [metronidazole] and Macrobid [nitrofurantoin]      Current Outpatient Medications:     Bacillus Coagulans-Inulin (Probiotic) 1-250 BILLION-MG CAPS, Take by mouth, Disp: , Rfl:     ondansetron (ZOFRAN) 4 mg tablet, Take 1 tablet (4 mg total) by mouth every 8 (eight) hours as needed for nausea or vomiting (Patient not taking: Reported on 8/10/2021), Disp: 20 tablet, Rfl: 0    ondansetron (ZOFRAN-ODT) 4 mg disintegrating tablet, Take 1 tablet (4 mg total) by mouth every 8 (eight) hours as needed for nausea (Patient not taking: Reported on 8/10/2021), Disp: 15 tablet, Rfl: 0    pantoprazole (PROTONIX) 40 mg tablet, Take 1 tablet (40 mg total) by mouth daily (Patient not taking: Reported on 8/10/2021), Disp: 30 tablet, Rfl: 0    pantoprazole (PROTONIX) 40 mg tablet, Take 1 tablet (40 mg total) by mouth 2 (two) times a day for 14 days, Disp: 28 tablet, Rfl: 0    rifabutin (MYCOBUTIN) 150 mg capsule, Take 2 capsules (300 mg total) by mouth daily for 14 days, Disp: 28 capsule, Rfl: 0     I have reviewed the past medical, social and family history, current medications, allergies, vitals, review of systems and updated this information as appropriate today     Objective:    Vitals:  Blood pressure 108/72, pulse 66, temperature 98 7 °F (37 1 °C), temperature source Tympanic, height 5' 2" (1 575 m), weight 73 9 kg (163 lb), unknown if currently breastfeeding  Physical Exam    Neurological Exam    GENERAL:  Cooperative in no acute distress  Well-developed and well-nourished    HEAD and NECK   Head is atraumatic normocephalic with no lesions or masses  Neck is supple with full range of motion    CARDIOVASCULAR  Carotid Arteries-no carotid bruits  NEUROLOGIC:  Mental Status-the patient is awake alert and oriented without aphasia or apraxia  Cranial Nerves: Visual fields are full to confrontation  Discs are flat  Extraocular movements are full without nystagmus  Pupils are 2-1/2 mm and reactive  Face is symmetrical to light touch , other than subtle decreased sensation in the left lower cheek relative to the right  Movements of facial expression move symmetrically  Hearing is normal to finger rub bilaterally  Soft palate lifts symmetrically  Shoulder shrug is symmetrical  Tongue is midline without atrophy  Motor: No drift is noted on arm extension  Strength is full in the upper and lower extremities with normal bulk and tone  Sensory: Intact to temperature and vibratory sensation in the upper and lower extremities bilaterally   Cortical function is intact  Coordination: Finger to nose testing is performed accurately  Romberg is negative  Gait reveals a normal base with symmetrical arm swing  Tandem walk is normal   Reflexes:  Trace to 1 in the biceps, triceps, brachioradialis, knee jerk and ankle jerk regions  Toes are downgoing            ROS:    Review of Systems   Constitutional: Negative  Negative for appetite change and fever  HENT: Negative  Negative for hearing loss, tinnitus, trouble swallowing and voice change  Eyes: Positive for photophobia and pain  Respiratory: Negative  Negative for shortness of breath  Cardiovascular: Negative  Negative for palpitations  Gastrointestinal: Negative  Negative for nausea and vomiting  Endocrine: Negative  Negative for cold intolerance  Genitourinary: Negative  Negative for dysuria, frequency and urgency  Musculoskeletal: Negative  Negative for myalgias and neck pain  Skin: Negative  Negative for rash  Neurological: Positive for dizziness, weakness, light-headedness and headaches  Negative for tremors, seizures, syncope, facial asymmetry, speech difficulty and numbness  Hematological: Negative  Does not bruise/bleed easily  Psychiatric/Behavioral: Positive for sleep disturbance  Negative for confusion and hallucinations  The patient is nervous/anxious

## 2021-08-14 NOTE — PROGRESS NOTES
Please notify rafael oleary neurology she does not have migraine w/ aura so would be candidate for OCP if she desires   Please see if she would like Rx  thanks

## 2021-08-16 ENCOUNTER — TELEPHONE (OUTPATIENT)
Dept: OBGYN CLINIC | Facility: CLINIC | Age: 26
End: 2021-08-16

## 2021-08-16 NOTE — TELEPHONE ENCOUNTER
----- Message from Sammie Stanford MD sent at 8/14/2021  9:08 AM EDT -----      Please notify rafael - per neurology she does not have migraine w/ aura so would be candidate for OCP if she desires   Please see if she would like Rx  thanks

## 2021-08-16 NOTE — TELEPHONE ENCOUNTER
Spoke with patient  Would like to start oc's  Not due for cycle for another few weeks  Confirmed pharmacy

## 2021-08-27 ENCOUNTER — TELEMEDICINE (OUTPATIENT)
Dept: FAMILY MEDICINE CLINIC | Facility: CLINIC | Age: 26
End: 2021-08-27
Payer: COMMERCIAL

## 2021-08-27 VITALS — HEIGHT: 62 IN | TEMPERATURE: 101 F | OXYGEN SATURATION: 100 % | WEIGHT: 163 LBS | BODY MASS INDEX: 30 KG/M2

## 2021-08-27 DIAGNOSIS — U07.1 COVID-19 VIRUS DETECTED: Primary | ICD-10-CM

## 2021-08-27 DIAGNOSIS — U07.1 COVID-19: ICD-10-CM

## 2021-08-27 PROCEDURE — 99214 OFFICE O/P EST MOD 30 MIN: CPT | Performed by: FAMILY MEDICINE

## 2021-08-27 RX ORDER — SODIUM CHLORIDE 9 MG/ML
20 INJECTION, SOLUTION INTRAVENOUS ONCE
Status: CANCELLED | OUTPATIENT
Start: 2021-08-28

## 2021-08-27 RX ORDER — CHOLECALCIFEROL (VITAMIN D3) 1250 MCG
CAPSULE ORAL
COMMUNITY
Start: 2021-07-28 | End: 2021-10-07 | Stop reason: ALTCHOICE

## 2021-08-27 RX ORDER — ALBUTEROL SULFATE 90 UG/1
3 AEROSOL, METERED RESPIRATORY (INHALATION) ONCE AS NEEDED
Status: CANCELLED | OUTPATIENT
Start: 2021-08-28

## 2021-08-27 RX ORDER — ONDANSETRON 2 MG/ML
4 INJECTION INTRAMUSCULAR; INTRAVENOUS ONCE AS NEEDED
Status: CANCELLED | OUTPATIENT
Start: 2021-08-28

## 2021-08-27 RX ORDER — ACETAMINOPHEN 325 MG/1
650 TABLET ORAL ONCE AS NEEDED
Status: CANCELLED | OUTPATIENT
Start: 2021-08-28

## 2021-08-27 NOTE — PROGRESS NOTES
COVID-19 Outpatient Progress Note    Assessment/Plan:    Problem List Items Addressed This Visit        Other    COVID-19 virus detected - Primary         Disposition:     I recommended self-quarantine for 10 days and to watch for symptoms until 14 days after exposure  If patient were to develop symptoms, they should self isolate and call our office for further guidance  Patient is at increased risk of progressing towards severe COVID-19 due to the following high risk criteria:   - Obesity or being overweight    Patient meets criteria for Casirivimab/Imdevimab administration for the treatment of COVID-19  They were counseled in regards to risks, benefits, and side effects of this infusion  Casirivimab and imdevimab are investigational medicines used to treat mild to moderate symptoms of COVID-19 in non-hospitalized adults and adolescents (15years of age and older who weigh at least 80 pounds (40 kg)), and who are at high risk for developing severe COVID-19 symptoms or the need for hospitalization  Casirivimab and imdevimab are investigational because they are still being studied  There is limited information known about the safety and effectiveness of using casirivimab and imdevimab to treat people with COVID-19  The FDA has authorized the emergency use of casirivimab and imdevimab for the treatment of COVID-19 under an Emergency Use Authorization (EUA)  Possible side effects of casirivimab and imdevimab: Allergic reactions can happen during and after infusion with casirivimab and imdevimab  Possible reactions include: fever, chills, nausea, headache, shortness of breath, low blood pressure, wheezing, swelling of your lips, face, or throat, rash including hives, itching, muscle aches, and dizziness  The side effects of getting any medicine by vein may include brief pain, bleeding, bruising of the skin, soreness, swelling, and possible infection at the infusion site      These are not all the possible side effects of casirivimab and imdevimab  Not a lot of people have been given casirivimab and imdevimab  Serious and unexpected side effects may happen  Casirivimab and imdevimab are still being studied so it is possible that all of the risks are not known at this time  It is possible that casirivimab and imdevimab could interfere with your body's own ability to fight off a future infection of SARS-CoV-2  Similarly, casirivimab and imdevimab may reduce your body's immune response to a vaccine for SARS-CoV-2  Specific studies have not been conducted to address these possible risks  Emergency Use Authorization:    The [facility] FDA has made casirivimab and imdevimab available under an emergency access mechanism called an EUA  The EUA is supported by a  of Health and Human Service ([facility]) declaration that circumstances exist to justify the emergency use of drugs and biological products during the COVID-19 pandemic  Casirivimab and imdevimab have not undergone the same type of review as an FDA-approved or cleared product  The FDA may issue an EUA when certain criteria are met, which includes that there are no adequate, approved, available alternatives  In addition, the FDA decision is based on the totality of scientific evidence available showing that it is reasonable to believe that the product meets certain criteria for safety, performance, and labeling and may be effective in treatment of patients during the COVID-19 pandemic  All of these criteria must be met to allow for the product to be used in the treatment of patients during the COVID-19 pandemic  The EUA for casirivimab and imdevimab is in effect for the duration of the COVID-19 declaration justifying emergency use of these products, unless terminated or revoked (after which the products may no longer be used)       Regarding COVID-19 Vaccination:    Currently there is no data or safety or efficacy of COVID-19 vaccination in persons who received monoclonal antibodies as part of COVID-19 treatment  Treatment should be deferred for at least 90 days to avoid interference of the treatment with vaccine-induced immune responses (this is based on estimated half-life of therapies and evidence suggesting reinfection is uncommon within 90 days of initial infection)  The patient consents to proceed with casirivimab and imdevimab administration  I have spent 10 minutes directly with the patient  Greater than 50% of this time was spent in counseling/coordination of care regarding: diagnostic results, prognosis, risks and benefits of treatment options, instructions for management, patient and family education, importance of treatment compliance, risk factor reductions and impressions  Verification of patient location:    Patient is located in the following state in which I hold an active license [place]    Encounter provider Ricki Carpenter MD    Provider located at [address] [place] [phone]    Recent Visits  No visits were found meeting these conditions  Showing recent visits within past 7 days and meeting all other requirements  Today's Visits  Date Type Provider Dept   08/27/21 Telemedicine Ricki Carpenter MD [facility]   Showing today's visits and meeting all other requirements  Future Appointments  No visits were found meeting these conditions  Showing future appointments within next 150 days and meeting all other requirements     This virtual check-in was done via [employer] and patient was informed that this is a secure, HIPAA-compliant platform  She agrees to proceed  Patient agrees to participate in a virtual check in via telephone or video visit instead of presenting to the office to address urgent/immediate medical needs  Patient is aware this is a billable service  After connecting through [facility], the patient was identified by name and date of birth  Jennifer Saavedra was informed that this was a telemedicine visit and that the exam was being conducted confidentially over secure lines  My office door was closed  No one else was in the room  Jennifer Saavedra acknowledged consent and understanding of privacy and security of the telemedicine visit  I informed the patient that I have reviewed her record in Epic and presented the opportunity for her to ask any questions regarding the visit today  The patient agreed to participate  Subjective: Jennifer Saavedra is a 31 y o  female who is concerned about COVID-19  Patient's symptoms include fever, chills, fatigue, malaise, nasal congestion, sore throat, anosmia, loss of taste and nausea  Patient denies cough, shortness of breath, chest tightness, abdominal pain, vomiting, diarrhea, myalgias and headaches  Date of exposure: 2021  COVID-19 vaccination status: Not vaccinated    Lab Results   Component Value Date    SARSCOV2 Not Detected 2020    SARSCORONAVI Detected (A) 2021     Past Medical History:   Diagnosis Date    Anemia complicating pregnancy 18/3/0675    Anxiety     Asthma     Cerebral concussion 3/17/2017    Group B Streptococcus carrier, antepartum 2017    Head injury     Herpes     Migraine     Postpartum depression     Secondary amenorrhea 2020    Varicella     vaccine     Past Surgical History:   Procedure Laterality Date    APPENDECTOMY       SECTION      INDUCED   2012 2020    X 2    [place]  DELIVERY ONLY N/A 2017    Procedure:  SECTION ();   Surgeon: Cheli Beltran DO;  Location: [facility];  Service: Obstetrics     Current Outpatient Medications   Medication Sig Dispense Refill    Albuterol Sulfate 108 (90 Base) MCG/ACT AEPB Inhale 1 puff every 4 (four) hours as needed      Bacillus Coagulans-Inulin (Probiotic) 1-250 BILLION-MG CAPS Take by mouth      Cholecalciferol (Vitamin D3) 1 25 MG (98067 UT) CAPS       pantoprazole (PROTONIX) 40 mg tablet Take 1 tablet (40 mg total) by mouth daily 30 tablet 0    ondansetron (ZOFRAN-ODT) 4 mg disintegrating tablet Take 1 tablet (4 mg total) by mouth every 8 (eight) hours as needed for nausea (Patient not taking: Reported on 8/10/2021) 15 tablet 0     No current facility-administered medications for this visit  Allergies   Allergen Reactions    Flagyl [Metronidazole] Nausea Only    Macrobid [Nitrofurantoin] Nausea Only       Review of Systems   Constitutional: Positive for chills, fatigue and fever  HENT: Positive for congestion and sore throat  Eyes: Negative  Respiratory: Negative  Negative for cough, chest tightness and shortness of breath  Cardiovascular: Negative  Negative for chest pain and palpitations  Gastrointestinal: Positive for nausea  Negative for abdominal pain, diarrhea and vomiting  Endocrine: Negative  Genitourinary: Negative  Musculoskeletal: Negative  Negative for myalgias  Neurological: Negative  Negative for headaches  Psychiatric/Behavioral: Negative  Objective:    Vitals:    08/27/21 0814   Temp: (!) 101 °F (38 3 °C)   SpO2: 100%   Weight: 73 9 kg (163 lb)   Height: 5' 2" (1 575 m)       Physical Exam  Constitutional:       General: She is not in acute distress  Appearance: She is well-developed  Pulmonary:      Effort: Pulmonary effort is normal    Neurological:      Mental Status: She is alert and oriented to person, place, and time  Psychiatric:         Behavior: Behavior normal          Thought Content: Thought content normal          Judgment: Judgment normal          VIRTUAL VISIT DISCLAIMER    Curly Braun verbally agrees to participate in [employer]   Pt is aware that [employer] could be limited without vital signs or the ability to perform a full hands-on physical Monicaey Phalen understands she or the provider may request at any time to terminate the video visit and request the patient to seek care or treatment in person

## 2021-08-28 ENCOUNTER — HOSPITAL ENCOUNTER (OUTPATIENT)
Dept: INFUSION CENTER | Facility: HOSPITAL | Age: 26
Discharge: HOME | End: 2021-08-28
Payer: COMMERCIAL

## 2021-08-28 VITALS
SYSTOLIC BLOOD PRESSURE: 113 MMHG | OXYGEN SATURATION: 100 % | TEMPERATURE: 99.3 F | HEART RATE: 85 BPM | DIASTOLIC BLOOD PRESSURE: 65 MMHG

## 2021-08-28 DIAGNOSIS — U07.1 COVID-19: Primary | ICD-10-CM

## 2021-08-28 PROCEDURE — M0243 CASIRIVI AND IMDEVI INFUSION: HCPCS | Performed by: FAMILY MEDICINE

## 2021-08-28 RX ORDER — ALBUTEROL SULFATE 90 UG/1
3 AEROSOL, METERED RESPIRATORY (INHALATION) ONCE AS NEEDED
Status: CANCELLED | OUTPATIENT
Start: 2021-08-28

## 2021-08-28 RX ORDER — SODIUM CHLORIDE 9 MG/ML
20 INJECTION, SOLUTION INTRAVENOUS ONCE
Status: COMPLETED | OUTPATIENT
Start: 2021-08-28 | End: 2021-08-28

## 2021-08-28 RX ORDER — SODIUM CHLORIDE 9 MG/ML
20 INJECTION, SOLUTION INTRAVENOUS ONCE
Status: CANCELLED | OUTPATIENT
Start: 2021-08-28

## 2021-08-28 RX ORDER — ACETAMINOPHEN 325 MG/1
650 TABLET ORAL ONCE AS NEEDED
Status: CANCELLED | OUTPATIENT
Start: 2021-08-28

## 2021-08-28 RX ORDER — ONDANSETRON 2 MG/ML
4 INJECTION INTRAMUSCULAR; INTRAVENOUS ONCE AS NEEDED
Status: CANCELLED | OUTPATIENT
Start: 2021-08-28

## 2021-08-28 RX ORDER — ALBUTEROL SULFATE 90 UG/1
3 AEROSOL, METERED RESPIRATORY (INHALATION) ONCE AS NEEDED
Status: DISCONTINUED | OUTPATIENT
Start: 2021-08-28 | End: 2021-08-31 | Stop reason: HOSPADM

## 2021-08-28 RX ORDER — ONDANSETRON 2 MG/ML
4 INJECTION INTRAMUSCULAR; INTRAVENOUS ONCE AS NEEDED
Status: DISCONTINUED | OUTPATIENT
Start: 2021-08-28 | End: 2021-08-31 | Stop reason: HOSPADM

## 2021-08-28 RX ORDER — ACETAMINOPHEN 325 MG/1
650 TABLET ORAL ONCE AS NEEDED
Status: DISCONTINUED | OUTPATIENT
Start: 2021-08-28 | End: 2021-08-31 | Stop reason: HOSPADM

## 2021-08-28 RX ADMIN — ACETAMINOPHEN 650 MG: 325 TABLET, FILM COATED ORAL at 08:26

## 2021-08-28 RX ADMIN — CASIRIVIMAB AND IMDEVIMAB 1200 MG: 600; 600 INJECTION, SOLUTION, CONCENTRATE INTRAVENOUS at 08:26

## 2021-08-28 RX ADMIN — SODIUM CHLORIDE 20 ML/HR: 0.9 INJECTION, SOLUTION INTRAVENOUS at 08:24

## 2021-08-28 NOTE — PROGRESS NOTES
Patient tolerated Regeneron (Casirivimab and Imdevimab) infusion and 1 hour post observation without incident  IV discontinued, catheter intact  Gauze applied to site  Discharge instructions reviewed with patient verbally  Copy of discharge instructions given to patient  Patient verbalized understanding of all discharge instructions  Patient discharged without incident  Patient to follow up with PCP on MOnday

## 2021-08-28 NOTE — PROGRESS NOTES
Infusion started  Patient advised to notify staff if new symptoms occur  Patient verbalized understanding of above  Pt medicated with tylenol for 7/10 headache at this time

## 2021-08-30 ENCOUNTER — NURSE TRIAGE (OUTPATIENT)
Dept: OTHER | Facility: OTHER | Age: 26
End: 2021-08-30

## 2021-08-31 ENCOUNTER — TELEMEDICINE (OUTPATIENT)
Dept: FAMILY MEDICINE CLINIC | Facility: CLINIC | Age: 26
End: 2021-08-31
Payer: COMMERCIAL

## 2021-08-31 VITALS — TEMPERATURE: 98.6 F

## 2021-08-31 DIAGNOSIS — U07.1 COVID-19: Primary | ICD-10-CM

## 2021-08-31 DIAGNOSIS — F41.0 PANIC ATTACK: ICD-10-CM

## 2021-08-31 PROCEDURE — 99214 OFFICE O/P EST MOD 30 MIN: CPT | Performed by: FAMILY MEDICINE

## 2021-08-31 RX ORDER — FLUTICASONE PROPIONATE 50 MCG
2 SPRAY, SUSPENSION (ML) NASAL DAILY
Qty: 16 G | Refills: 0 | Status: SHIPPED | OUTPATIENT
Start: 2021-08-31 | End: 2021-12-06 | Stop reason: ALTCHOICE

## 2021-08-31 RX ORDER — ALPRAZOLAM 0.25 MG/1
0.25 TABLET ORAL 2 TIMES DAILY PRN
Qty: 10 TABLET | Refills: 0 | Status: SHIPPED | OUTPATIENT
Start: 2021-08-31 | End: 2021-12-06 | Stop reason: ALTCHOICE

## 2021-08-31 NOTE — PROGRESS NOTES
COVID-19 Outpatient Progress Note    Assessment/Plan:    Problem List Items Addressed This Visit        Other    COVID-19 - Primary   advised to use nasal spray Flonase to reduce the sinus congestion or Claritin over-the-counter    Panic attack    Relevant Medications    ALPRAZolam (XANAX) 0 25 mg tablet     web site is checked and given the alprazolam and she will follow up for anxiety and panic in the office after she is cleared of infection      Disposition:     I recommended continued isolation until at least 24 hours have passed since recovery defined as resolution of fever without the use of fever-reducing medications AND improvement in COVID symptoms AND 10 days have passed since onset of symptoms (or 10 days have passed since date of first positive viral diagnostic test for asymptomatic patients)  I have spent 20 minutes directly with the patient  Greater than 50% of this time was spent in counseling/coordination of care regarding: diagnostic results, prognosis, importance of treatment compliance and impressions  Verification of patient location:    Patient is located in the following state in which I hold an active license [place]    Encounter provider Tyler Mcghee MD    Provider located at [address] [place] [id number]    Recent Visits  Date Type Provider Dept   08/27/21 Juana Haro MD [facility]   Showing recent visits within past 7 days and meeting all other requirements  Today's Visits  Date Type Provider Dept   08/31/21 Telemedicine Tyler Mcghee MD [facility]   Showing today's visits and meeting all other requirements  Future Appointments  No visits were found meeting these conditions  Showing future appointments within next 150 days and meeting all other requirements     This virtual check-in was done via [employer] and patient was informed that this is a secure, HIPAA-compliant platform   She agrees to proceed  Patient agrees to participate in a virtual check in via telephone or video visit instead of presenting to the office to address urgent/immediate medical needs  Patient is aware this is a billable service  After connecting through [facility], the patient was identified by name and date of birth  Alpa Doss was informed that this was a telemedicine visit and that the exam was being conducted confidentially over secure lines  My office door was closed  No one else was in the room  Alpa Doss acknowledged consent and understanding of privacy and security of the telemedicine visit  I informed the patient that I have reviewed her record in Epic and presented the opportunity for her to ask any questions regarding the visit today  The patient agreed to participate  Subjective: Alpa Doss is a 31 y o  female who has been screened for COVID-19  Symptom change since last report: improving  Patient's symptoms include fatigue, nasal congestion, anosmia, loss of taste, cough, chest tightness, nausea and myalgias  Patient denies fever, chills, malaise, rhinorrhea, sore throat, shortness of breath, abdominal pain, vomiting, diarrhea and headaches  Date of symptom onset: 8/23/2021  Date of exposure: 8/23/2021  Date of positive COVID-19 PCR: 8/24/2021  COVID-19 vaccination status: Not vaccinated    Cirilo Hernandez has been staying home and has isolated themselves in her home  She is taking care to not share personal items and is cleaning all surfaces that are touched often, like counters, tabletops, and doorknobs using household cleaning sprays or wipes  She is wearing a mask when she leaves her room        She got her monoclonal IV antibodies 2 days ago   she complains of anxiety and panic which is going on for long time but is getting worse since she is sick and she gets panic attacks and she wants some help she is she comes in the office    Lab Results   Component Value Date    SARSCOV2 Not Detected 2020    SARSCORONAVI Detected (A) 2021     Past Medical History:   Diagnosis Date    Anemia complicating pregnancy     Anxiety     Asthma     Cerebral concussion 3/17/2017    Group B Streptococcus carrier, antepartum 2017    Head injury     Herpes     Migraine     Postpartum depression     Secondary amenorrhea 2020    Varicella     vaccine     Past Surgical History:   Procedure Laterality Date    APPENDECTOMY       SECTION      INDUCED   2012 2020    X 2    [place]  DELIVERY ONLY N/A 2017    Procedure:  SECTION (); Surgeon: Joseph Rollins DO;  [facility];  Service: Obstetrics     Current Outpatient Medications   Medication Sig Dispense Refill    Albuterol Sulfate 108 (90 Base) MCG/ACT AEPB Inhale 1 puff every 4 (four) hours as needed      Bacillus Coagulans-Inulin (Probiotic) 1-250 BILLION-MG CAPS Take by mouth      Cholecalciferol (Vitamin D3) 1 25 MG (42103 UT) CAPS       pantoprazole (PROTONIX) 40 mg tablet Take 1 tablet (40 mg total) by mouth daily 30 tablet 0    ALPRAZolam (XANAX) 0 25 mg tablet Take 1 tablet (0 25 mg total) by mouth 2 (two) times a day as needed for anxiety for up to 5 days 10 tablet 0    fluticasone (FLONASE) 50 mcg/act nasal spray 2 sprays into each nostril daily 16 g 0    ondansetron (ZOFRAN-ODT) 4 mg disintegrating tablet Take 1 tablet (4 mg total) by mouth every 8 (eight) hours as needed for nausea (Patient not taking: Reported on 8/10/2021) 15 tablet 0     No current facility-administered medications for this visit  Allergies   Allergen Reactions    Flagyl [Metronidazole] Nausea Only    Macrobid [Nitrofurantoin] Nausea Only       Review of Systems   Constitutional: Positive for fatigue  Negative for chills and fever  HENT: Positive for congestion  Negative for rhinorrhea and sore throat  Eyes: Negative  Respiratory: Positive for cough and chest tightness   Negative for shortness of breath  Gastrointestinal: Positive for nausea  Negative for abdominal pain, diarrhea and vomiting  Musculoskeletal: Positive for myalgias  Neurological: Negative for headaches  Objective:    Vitals:    08/31/21 1041   Temp: 98 6 °F (37 °C)       Physical Exam  Constitutional:       Appearance: Normal appearance  She is ill-appearing  Eyes:      General:         Right eye: No discharge  Left eye: No discharge  Pulmonary:      Effort: Pulmonary effort is normal  No respiratory distress  Neurological:      General: No focal deficit present  Mental Status: She is alert  Psychiatric:         Mood and Affect: Mood normal          VIRTUAL VISIT DISCLAIMER    Ryanrichard [place] verbally agrees to participate in [employer]  Pt is aware that [employer] could be limited without vital signs or the ability to perform a full hands-on physical Dalton Gordon understands she or the provider may request at any time to terminate the video visit and request the patient to seek care or treatment in person

## 2021-08-31 NOTE — TELEPHONE ENCOUNTER
Reason for Disposition   Common cold with no complications    Answer Assessment - Initial Assessment Questions  1  ONSET: "When did the nasal discharge start?"       A few days ago  2  AMOUNT: "How much discharge is there?"       None   3  COUGH: "Do you have a cough?" If yes, ask: "Describe the color of your sputum" (clear, white, yellow, green)      No   4  RESPIRATORY DISTRESS: "Describe your breathing "       Normal  5  FEVER: "Do you have a fever?" If Yes, ask: "What is your temperature, how was it measured, and when did it start?"      No  6  SEVERITY: "Overall, how bad are you feeling right now?" (e g , doesn't interfere with normal activities, staying home from school/work, staying in bed)       moderate  7  OTHER SYMPTOMS: "Do you have any other symptoms?" (e g , sore throat, earache, wheezing, vomiting)      No  8   PREGNANCY: "Is there any chance you are pregnant?" "When was your last menstrual period?"      No    Protocols used: COMMON COLD-ADULT-

## 2021-08-31 NOTE — TELEPHONE ENCOUNTER
Regarding: Burning Sensation in Nostrils  ----- Message from Valerie Barnard sent at 8/30/2021  9:33 PM EDT -----  "I have covid, and I have been having this burning sensation in my nostril and it hurts so bad it gives me a headache "

## 2021-09-22 ENCOUNTER — TELEMEDICINE (OUTPATIENT)
Dept: FAMILY MEDICINE CLINIC | Facility: CLINIC | Age: 26
End: 2021-09-22
Payer: COMMERCIAL

## 2021-09-22 VITALS — HEIGHT: 62 IN | WEIGHT: 162 LBS | BODY MASS INDEX: 29.81 KG/M2

## 2021-09-22 DIAGNOSIS — Z86.16 PERSONAL HISTORY OF COVID-19: ICD-10-CM

## 2021-09-22 DIAGNOSIS — R53.83 FATIGUE, UNSPECIFIED TYPE: ICD-10-CM

## 2021-09-22 DIAGNOSIS — R06.02 SHORTNESS OF BREATH ON EXERTION: ICD-10-CM

## 2021-09-22 PROCEDURE — 99214 OFFICE O/P EST MOD 30 MIN: CPT | Performed by: FAMILY MEDICINE

## 2021-09-22 NOTE — PROGRESS NOTES
Patient Name: Umer Jordan     : 1995     MRN: [id number]    Assessment/Plan:    Problem List Items Addressed This Visit     None      Visit Diagnoses     Personal history of COVID-19        Relevant Orders    CBC and differential    Comprehensive metabolic panel    TSH, 3rd generation with Free T4 reflex    XR chest pa & lateral    Shortness of breath on exertion        Fatigue, unspecified type          denies wheezing   Discussion:     Labs recommended:  CBC, CRP, TSH, CMP and Hepatic function panel    Diagnostic testing recommended:  Chest x-ray      advised to take Pepcid 20 mg twice a day for her stomach issues    I spent 15 minutes directly with the patient during this visit     Subjective:   she says now her daughter was 11year-old she has tested positive for COVID yesterday, as she was in contact with a  person in school, but daughter is asymptomatic, patient says my symptoms are since I had COVID last month and symptoms are not resolving do also has dietitian problem, in the past she has H pylori treatment  COVID-19 Infection:  Date of symptom onset: 2021  Date of positive test: 2021    Initial symptoms with acute illness:  Initial symptoms included: chills, shortness of breath, muscle aches, loss of smell, loss of taste and fatigue  Patient denied: fever, cough, rhinorrhea, chest tightness, nasal congestion, sore throat, diarrhea and headache    New or persistent symptoms:  Patient complains of: fatigue, shortness of breath, altered taste and altered smell  Patient rates severity of current symptoms as moderate  Other symptoms: digestion problem  Inpatient treatment:      Was patient hospitalized?: No      Outpatient treatment:  - Patient received monoclonal antibody therapy: Yes      Review of Systems   Constitutional: Positive for fatigue  Respiratory: Positive for shortness of breath           Patient Active Problem List   Diagnosis    Anxiety    Migrainous headache without aura    BMI 30 0-30 9,adult    Hemorrhoids    Annual physical exam    Chronic left-sided low back pain with bilateral sciatica    Bleeding in early pregnancy    Gastroesophageal reflux disease without esophagitis    COVID-19 virus detected    COVID-19    Panic attack     Social History     Tobacco Use    Smoking status: Former Smoker    Smokeless tobacco: Never Used   Vaping Use    Vaping Use: Never used   Substance Use Topics    Alcohol use: Not Currently    Drug use: No      Objective:  Ht 5' 2" (1 575 m)   Wt 73 5 kg (162 lb)   BMI 29 63 kg/m²      Physical Exam  Constitutional:       Appearance: She is not ill-appearing  HENT:      Head: Normocephalic  Pulmonary:      Effort: Pulmonary effort is normal    Musculoskeletal:      Cervical back: Normal range of motion  Neurological:      Mental Status: She is alert     Psychiatric:         Mood and Affect: Mood normal          Princess Henley MD

## 2021-09-23 ENCOUNTER — VBI (OUTPATIENT)
Dept: ADMINISTRATIVE | Facility: OTHER | Age: 26
End: 2021-09-23

## 2021-09-23 ENCOUNTER — HOSPITAL ENCOUNTER (EMERGENCY)
Facility: HOSPITAL | Age: 26
Discharge: LEFT BEFORE DISCHARGE | End: 2021-09-23
Payer: COMMERCIAL

## 2021-09-23 VITALS
OXYGEN SATURATION: 98 % | SYSTOLIC BLOOD PRESSURE: 113 MMHG | RESPIRATION RATE: 18 BRPM | BODY MASS INDEX: 29.63 KG/M2 | HEART RATE: 77 BPM | WEIGHT: 162 LBS | DIASTOLIC BLOOD PRESSURE: 65 MMHG | TEMPERATURE: 98.8 F

## 2021-09-23 LAB
ALBUMIN SERPL BCP-MCNC: 3.3 G/DL (ref 3.5–5)
ALP SERPL-CCNC: 49 U/L (ref 46–116)
ALT SERPL W P-5'-P-CCNC: 9 U/L (ref 12–78)
ANION GAP SERPL CALCULATED.3IONS-SCNC: 9 MMOL/L (ref 4–13)
AST SERPL W P-5'-P-CCNC: 11 U/L (ref 5–45)
B-HCG SERPL-ACNC: ABNORMAL MIU/ML
BASOPHILS # BLD AUTO: 0.05 THOUSANDS/ΜL (ref 0–0.1)
BASOPHILS NFR BLD AUTO: 1 % (ref 0–1)
BILIRUB SERPL-MCNC: 0.2 MG/DL (ref 0.2–1)
BUN SERPL-MCNC: 8 MG/DL (ref 5–25)
CALCIUM ALBUM COR SERPL-MCNC: 9.7 MG/DL (ref 8.3–10.1)
CALCIUM SERPL-MCNC: 9.1 MG/DL (ref 8.3–10.1)
CHLORIDE SERPL-SCNC: 99 MMOL/L (ref 100–108)
CO2 SERPL-SCNC: 23 MMOL/L (ref 21–32)
CREAT SERPL-MCNC: 0.48 MG/DL (ref 0.6–1.3)
EOSINOPHIL # BLD AUTO: 0.11 THOUSAND/ΜL (ref 0–0.61)
EOSINOPHIL NFR BLD AUTO: 2 % (ref 0–6)
ERYTHROCYTE [DISTWIDTH] IN BLOOD BY AUTOMATED COUNT: 14.6 % (ref 11.6–15.1)
GFR SERPL CREATININE-BSD FRML MDRD: 155 ML/MIN/1.73SQ M
GLUCOSE SERPL-MCNC: 136 MG/DL (ref 65–140)
HCT VFR BLD AUTO: 34.5 % (ref 34.8–46.1)
HGB BLD-MCNC: 11.1 G/DL (ref 11.5–15.4)
IMM GRANULOCYTES # BLD AUTO: 0.02 THOUSAND/UL (ref 0–0.2)
IMM GRANULOCYTES NFR BLD AUTO: 0 % (ref 0–2)
LIPASE SERPL-CCNC: 50 U/L (ref 73–393)
LYMPHOCYTES # BLD AUTO: 2.02 THOUSANDS/ΜL (ref 0.6–4.47)
LYMPHOCYTES NFR BLD AUTO: 28 % (ref 14–44)
MCH RBC QN AUTO: 26.4 PG (ref 26.8–34.3)
MCHC RBC AUTO-ENTMCNC: 32.2 G/DL (ref 31.4–37.4)
MCV RBC AUTO: 82 FL (ref 82–98)
MONOCYTES # BLD AUTO: 0.56 THOUSAND/ΜL (ref 0.17–1.22)
MONOCYTES NFR BLD AUTO: 8 % (ref 4–12)
NEUTROPHILS # BLD AUTO: 4.39 THOUSANDS/ΜL (ref 1.85–7.62)
NEUTS SEG NFR BLD AUTO: 61 % (ref 43–75)
NRBC BLD AUTO-RTO: 0 /100 WBCS
PLATELET # BLD AUTO: 257 THOUSANDS/UL (ref 149–390)
PMV BLD AUTO: 11 FL (ref 8.9–12.7)
POTASSIUM SERPL-SCNC: 3.6 MMOL/L (ref 3.5–5.3)
PROT SERPL-MCNC: 7.9 G/DL (ref 6.4–8.2)
RBC # BLD AUTO: 4.2 MILLION/UL (ref 3.81–5.12)
SODIUM SERPL-SCNC: 131 MMOL/L (ref 136–145)
WBC # BLD AUTO: 7.15 THOUSAND/UL (ref 4.31–10.16)

## 2021-09-23 PROCEDURE — 83690 ASSAY OF LIPASE: CPT

## 2021-09-23 PROCEDURE — 80053 COMPREHEN METABOLIC PANEL: CPT

## 2021-09-23 PROCEDURE — 36415 COLL VENOUS BLD VENIPUNCTURE: CPT

## 2021-09-23 PROCEDURE — 84702 CHORIONIC GONADOTROPIN TEST: CPT

## 2021-09-23 PROCEDURE — 85025 COMPLETE CBC W/AUTO DIFF WBC: CPT

## 2021-10-04 ENCOUNTER — TELEPHONE (OUTPATIENT)
Dept: OBGYN CLINIC | Facility: CLINIC | Age: 26
End: 2021-10-04

## 2021-10-04 DIAGNOSIS — Z03.818 ENCOUNTER FOR OBSERVATION FOR SUSPECTED EXPOSURE TO OTHER BIOLOGICAL AGENTS RULED OUT: Primary | ICD-10-CM

## 2021-10-04 DIAGNOSIS — N76.0 BV (BACTERIAL VAGINOSIS): ICD-10-CM

## 2021-10-04 DIAGNOSIS — B96.89 BV (BACTERIAL VAGINOSIS): ICD-10-CM

## 2021-10-04 DIAGNOSIS — R11.0 NAUSEA: ICD-10-CM

## 2021-10-04 RX ORDER — METRONIDAZOLE 500 MG/1
TABLET ORAL
Qty: 14 TABLET | Refills: 0 | Status: SHIPPED | OUTPATIENT
Start: 2021-10-04 | End: 2021-10-07 | Stop reason: ALTCHOICE

## 2021-10-05 RX ORDER — METRONIDAZOLE 7.5 MG/G
1 GEL VAGINAL 2 TIMES DAILY
Qty: 70 G | Refills: 0 | Status: SHIPPED | OUTPATIENT
Start: 2021-10-05 | End: 2021-10-07 | Stop reason: ALTCHOICE

## 2021-10-05 RX ORDER — ONDANSETRON 4 MG/1
TABLET, FILM COATED ORAL
Qty: 10 TABLET | Refills: 0 | Status: SHIPPED | OUTPATIENT
Start: 2021-10-05 | End: 2021-10-07 | Stop reason: ALTCHOICE

## 2021-10-07 ENCOUNTER — TELEPHONE (OUTPATIENT)
Dept: GASTROENTEROLOGY | Facility: CLINIC | Age: 26
End: 2021-10-07

## 2021-10-07 ENCOUNTER — OFFICE VISIT (OUTPATIENT)
Dept: GASTROENTEROLOGY | Facility: CLINIC | Age: 26
End: 2021-10-07
Payer: COMMERCIAL

## 2021-10-07 VITALS
HEART RATE: 89 BPM | WEIGHT: 162 LBS | SYSTOLIC BLOOD PRESSURE: 102 MMHG | HEIGHT: 62 IN | BODY MASS INDEX: 29.81 KG/M2 | DIASTOLIC BLOOD PRESSURE: 72 MMHG

## 2021-10-07 DIAGNOSIS — A04.8 H. PYLORI INFECTION: Primary | ICD-10-CM

## 2021-10-07 DIAGNOSIS — R10.10 PAIN OF UPPER ABDOMEN: ICD-10-CM

## 2021-10-07 DIAGNOSIS — K21.9 GASTROESOPHAGEAL REFLUX DISEASE WITHOUT ESOPHAGITIS: ICD-10-CM

## 2021-10-07 DIAGNOSIS — K92.0 HEMATEMESIS WITH NAUSEA: ICD-10-CM

## 2021-10-07 PROCEDURE — 99213 OFFICE O/P EST LOW 20 MIN: CPT | Performed by: PHYSICIAN ASSISTANT

## 2021-10-07 RX ORDER — PANTOPRAZOLE SODIUM 40 MG/1
40 TABLET, DELAYED RELEASE ORAL 2 TIMES DAILY
Qty: 60 TABLET | Refills: 1 | Status: SHIPPED | OUTPATIENT
Start: 2021-10-07 | End: 2021-12-06

## 2021-10-07 RX ORDER — FAMOTIDINE 20 MG/1
20 TABLET, FILM COATED ORAL
Qty: 30 TABLET | Refills: 0 | Status: SHIPPED | OUTPATIENT
Start: 2021-10-07 | End: 2021-12-06

## 2021-10-12 ENCOUNTER — APPOINTMENT (OUTPATIENT)
Dept: LAB | Facility: HOSPITAL | Age: 26
End: 2021-10-12
Payer: COMMERCIAL

## 2021-10-12 ENCOUNTER — HOSPITAL ENCOUNTER (OUTPATIENT)
Dept: RADIOLOGY | Facility: HOSPITAL | Age: 26
Discharge: HOME | End: 2021-10-12
Payer: COMMERCIAL

## 2021-10-12 DIAGNOSIS — Z86.16 PERSONAL HISTORY OF COVID-19: ICD-10-CM

## 2021-10-12 LAB
ALBUMIN SERPL BCP-MCNC: 3.2 G/DL (ref 3.5–5)
ALP SERPL-CCNC: 47 U/L (ref 46–116)
ALT SERPL W P-5'-P-CCNC: 8 U/L (ref 12–78)
ANION GAP SERPL CALCULATED.3IONS-SCNC: 9 MMOL/L (ref 4–13)
AST SERPL W P-5'-P-CCNC: 9 U/L (ref 5–45)
BASOPHILS # BLD AUTO: 0.03 THOUSANDS/ΜL (ref 0–0.1)
BASOPHILS NFR BLD AUTO: 1 % (ref 0–1)
BILIRUB SERPL-MCNC: 0.27 MG/DL (ref 0.2–1)
BUN SERPL-MCNC: 7 MG/DL (ref 5–25)
CALCIUM ALBUM COR SERPL-MCNC: 9.4 MG/DL (ref 8.3–10.1)
CALCIUM SERPL-MCNC: 8.8 MG/DL (ref 8.3–10.1)
CHLORIDE SERPL-SCNC: 103 MMOL/L (ref 100–108)
CO2 SERPL-SCNC: 25 MMOL/L (ref 21–32)
CREAT SERPL-MCNC: 0.46 MG/DL (ref 0.6–1.3)
EOSINOPHIL # BLD AUTO: 0.09 THOUSAND/ΜL (ref 0–0.61)
EOSINOPHIL NFR BLD AUTO: 2 % (ref 0–6)
ERYTHROCYTE [DISTWIDTH] IN BLOOD BY AUTOMATED COUNT: 14.5 % (ref 11.6–15.1)
GFR SERPL CREATININE-BSD FRML MDRD: 158 ML/MIN/1.73SQ M
GLUCOSE P FAST SERPL-MCNC: 95 MG/DL (ref 65–99)
HCT VFR BLD AUTO: 35.6 % (ref 34.8–46.1)
HGB BLD-MCNC: 11.4 G/DL (ref 11.5–15.4)
IMM GRANULOCYTES # BLD AUTO: 0.01 THOUSAND/UL (ref 0–0.2)
IMM GRANULOCYTES NFR BLD AUTO: 0 % (ref 0–2)
LYMPHOCYTES # BLD AUTO: 2.03 THOUSANDS/ΜL (ref 0.6–4.47)
LYMPHOCYTES NFR BLD AUTO: 34 % (ref 14–44)
MCH RBC QN AUTO: 27.1 PG (ref 26.8–34.3)
MCHC RBC AUTO-ENTMCNC: 32 G/DL (ref 31.4–37.4)
MCV RBC AUTO: 85 FL (ref 82–98)
MONOCYTES # BLD AUTO: 0.46 THOUSAND/ΜL (ref 0.17–1.22)
MONOCYTES NFR BLD AUTO: 8 % (ref 4–12)
NEUTROPHILS # BLD AUTO: 3.33 THOUSANDS/ΜL (ref 1.85–7.62)
NEUTS SEG NFR BLD AUTO: 55 % (ref 43–75)
NRBC BLD AUTO-RTO: 0 /100 WBCS
PLATELET # BLD AUTO: 273 THOUSANDS/UL (ref 149–390)
PMV BLD AUTO: 10.8 FL (ref 8.9–12.7)
POTASSIUM SERPL-SCNC: 3.8 MMOL/L (ref 3.5–5.3)
PROT SERPL-MCNC: 7.9 G/DL (ref 6.4–8.2)
RBC # BLD AUTO: 4.21 MILLION/UL (ref 3.81–5.12)
SODIUM SERPL-SCNC: 137 MMOL/L (ref 136–145)
TSH SERPL DL<=0.05 MIU/L-ACNC: 1.29 UIU/ML (ref 0.36–3.74)
WBC # BLD AUTO: 5.95 THOUSAND/UL (ref 4.31–10.16)

## 2021-10-12 PROCEDURE — 85025 COMPLETE CBC W/AUTO DIFF WBC: CPT

## 2021-10-12 PROCEDURE — 36415 COLL VENOUS BLD VENIPUNCTURE: CPT

## 2021-10-12 PROCEDURE — 84443 ASSAY THYROID STIM HORMONE: CPT

## 2021-10-12 PROCEDURE — 80053 COMPREHEN METABOLIC PANEL: CPT

## 2021-10-12 PROCEDURE — 71046 X-RAY EXAM CHEST 2 VIEWS: CPT

## 2021-10-25 ENCOUNTER — TELEPHONE (OUTPATIENT)
Dept: GASTROENTEROLOGY | Facility: HOSPITAL | Age: 26
End: 2021-10-25

## 2021-10-26 ENCOUNTER — ANESTHESIA (OUTPATIENT)
Dept: GASTROENTEROLOGY | Facility: HOSPITAL | Age: 26
End: 2021-10-26

## 2021-10-26 ENCOUNTER — HOSPITAL ENCOUNTER (OUTPATIENT)
Dept: GASTROENTEROLOGY | Facility: HOSPITAL | Age: 26
Setting detail: OUTPATIENT SURGERY
Discharge: HOME | End: 2021-10-26
Attending: INTERNAL MEDICINE
Payer: COMMERCIAL

## 2021-10-26 ENCOUNTER — ANESTHESIA EVENT (OUTPATIENT)
Dept: GASTROENTEROLOGY | Facility: HOSPITAL | Age: 26
End: 2021-10-26

## 2021-10-26 VITALS
DIASTOLIC BLOOD PRESSURE: 70 MMHG | SYSTOLIC BLOOD PRESSURE: 110 MMHG | RESPIRATION RATE: 19 BRPM | HEART RATE: 71 BPM | BODY MASS INDEX: 31.13 KG/M2 | HEIGHT: 61 IN | TEMPERATURE: 97.6 F | OXYGEN SATURATION: 100 % | WEIGHT: 164.9 LBS

## 2021-10-26 DIAGNOSIS — A04.8 H. PYLORI INFECTION: ICD-10-CM

## 2021-10-26 DIAGNOSIS — K92.0 HEMATEMESIS WITH NAUSEA: ICD-10-CM

## 2021-10-26 DIAGNOSIS — R10.10 PAIN OF UPPER ABDOMEN: ICD-10-CM

## 2021-10-26 DIAGNOSIS — K21.9 GASTROESOPHAGEAL REFLUX DISEASE WITHOUT ESOPHAGITIS: ICD-10-CM

## 2021-10-26 DIAGNOSIS — R11.0 NAUSEA: ICD-10-CM

## 2021-10-26 LAB
EXT PREGNANCY TEST URINE: POSITIVE
EXT. CONTROL: ABNORMAL

## 2021-10-26 PROCEDURE — 88305 TISSUE EXAM BY PATHOLOGIST: CPT | Performed by: PATHOLOGY

## 2021-10-26 PROCEDURE — 81025 URINE PREGNANCY TEST: CPT | Performed by: ANESTHESIOLOGY

## 2021-10-26 PROCEDURE — 43239 EGD BIOPSY SINGLE/MULTIPLE: CPT | Performed by: INTERNAL MEDICINE

## 2021-10-26 RX ORDER — LIDOCAINE HYDROCHLORIDE 20 MG/ML
INJECTION, SOLUTION EPIDURAL; INFILTRATION; INTRACAUDAL; PERINEURAL AS NEEDED
Status: DISCONTINUED | OUTPATIENT
Start: 2021-10-26 | End: 2021-10-26

## 2021-10-26 RX ORDER — PROPOFOL 10 MG/ML
INJECTION, EMULSION INTRAVENOUS AS NEEDED
Status: DISCONTINUED | OUTPATIENT
Start: 2021-10-26 | End: 2021-10-26

## 2021-10-26 RX ORDER — SODIUM CHLORIDE, SODIUM LACTATE, POTASSIUM CHLORIDE, CALCIUM CHLORIDE 600; 310; 30; 20 MG/100ML; MG/100ML; MG/100ML; MG/100ML
INJECTION, SOLUTION INTRAVENOUS CONTINUOUS PRN
Status: DISCONTINUED | OUTPATIENT
Start: 2021-10-26 | End: 2021-10-26

## 2021-10-26 RX ADMIN — PROPOFOL 20 MG: 10 INJECTION, EMULSION INTRAVENOUS at 11:14

## 2021-10-26 RX ADMIN — SODIUM CHLORIDE, SODIUM LACTATE, POTASSIUM CHLORIDE, AND CALCIUM CHLORIDE: .6; .31; .03; .02 INJECTION, SOLUTION INTRAVENOUS at 10:46

## 2021-10-26 RX ADMIN — LIDOCAINE HYDROCHLORIDE 5 ML: 20 INJECTION, SOLUTION EPIDURAL; INFILTRATION; INTRACAUDAL; PERINEURAL at 11:12

## 2021-10-26 RX ADMIN — PROPOFOL 200 MG: 10 INJECTION, EMULSION INTRAVENOUS at 11:13

## 2021-11-01 ENCOUNTER — TELEPHONE (OUTPATIENT)
Dept: GASTROENTEROLOGY | Facility: CLINIC | Age: 26
End: 2021-11-01

## 2021-11-26 ENCOUNTER — TELEPHONE (OUTPATIENT)
Dept: OBGYN CLINIC | Facility: CLINIC | Age: 26
End: 2021-11-26

## 2021-12-06 ENCOUNTER — OFFICE VISIT (OUTPATIENT)
Dept: OBGYN CLINIC | Facility: MEDICAL CENTER | Age: 26
End: 2021-12-06
Payer: COMMERCIAL

## 2021-12-06 VITALS — WEIGHT: 162.2 LBS | BODY MASS INDEX: 30.65 KG/M2 | DIASTOLIC BLOOD PRESSURE: 78 MMHG | SYSTOLIC BLOOD PRESSURE: 110 MMHG

## 2021-12-06 DIAGNOSIS — Z11.3 SCREEN FOR STD (SEXUALLY TRANSMITTED DISEASE): Primary | ICD-10-CM

## 2021-12-06 DIAGNOSIS — B37.3 YEAST VAGINITIS: ICD-10-CM

## 2021-12-06 DIAGNOSIS — A60.9 HSV (HERPES SIMPLEX VIRUS) ANOGENITAL INFECTION: ICD-10-CM

## 2021-12-06 PROBLEM — Z00.00 ANNUAL PHYSICAL EXAM: Status: RESOLVED | Noted: 2020-02-24 | Resolved: 2021-12-06

## 2021-12-06 PROBLEM — U07.1 COVID-19 VIRUS DETECTED: Status: RESOLVED | Noted: 2021-08-27 | Resolved: 2021-12-06

## 2021-12-06 PROBLEM — O20.9 BLEEDING IN EARLY PREGNANCY: Status: RESOLVED | Noted: 2021-02-23 | Resolved: 2021-12-06

## 2021-12-06 PROCEDURE — 87591 N.GONORRHOEAE DNA AMP PROB: CPT | Performed by: PHYSICIAN ASSISTANT

## 2021-12-06 PROCEDURE — 99213 OFFICE O/P EST LOW 20 MIN: CPT | Performed by: PHYSICIAN ASSISTANT

## 2021-12-06 PROCEDURE — 87491 CHLMYD TRACH DNA AMP PROBE: CPT | Performed by: PHYSICIAN ASSISTANT

## 2021-12-06 RX ORDER — VALACYCLOVIR HYDROCHLORIDE 500 MG/1
500 TABLET, FILM COATED ORAL 2 TIMES DAILY
Qty: 90 TABLET | Refills: 3 | Status: SHIPPED | OUTPATIENT
Start: 2021-12-06 | End: 2022-12-06

## 2021-12-06 RX ORDER — FLUCONAZOLE 150 MG/1
TABLET ORAL
Qty: 2 TABLET | Refills: 0 | Status: SHIPPED | OUTPATIENT
Start: 2021-12-06 | End: 2021-12-11

## 2021-12-07 LAB
C TRACH DNA SPEC QL NAA+PROBE: NEGATIVE
N GONORRHOEA DNA SPEC QL NAA+PROBE: NEGATIVE

## 2021-12-17 ENCOUNTER — TELEPHONE (OUTPATIENT)
Dept: OBGYN CLINIC | Facility: CLINIC | Age: 26
End: 2021-12-17

## 2021-12-17 DIAGNOSIS — N76.0 BV (BACTERIAL VAGINOSIS): Primary | ICD-10-CM

## 2021-12-17 DIAGNOSIS — B96.89 BV (BACTERIAL VAGINOSIS): Primary | ICD-10-CM

## 2021-12-17 RX ORDER — METRONIDAZOLE 7.5 MG/G
1 GEL VAGINAL 2 TIMES DAILY
Qty: 70 G | Refills: 0 | Status: SHIPPED | OUTPATIENT
Start: 2021-12-17 | End: 2021-12-22

## 2022-02-02 DIAGNOSIS — A04.8 H. PYLORI INFECTION: Primary | ICD-10-CM

## 2022-02-02 RX ORDER — AMOXICILLIN 250 MG/1
750 CAPSULE ORAL EVERY 8 HOURS SCHEDULED
Qty: 126 CAPSULE | Refills: 0 | Status: SHIPPED | OUTPATIENT
Start: 2022-02-02 | End: 2022-02-16

## 2022-02-02 RX ORDER — LEVOFLOXACIN 500 MG/1
500 TABLET, FILM COATED ORAL EVERY 24 HOURS
Qty: 14 TABLET | Refills: 0 | Status: SHIPPED | OUTPATIENT
Start: 2022-02-02 | End: 2022-02-16

## 2022-02-02 RX ORDER — PANTOPRAZOLE SODIUM 40 MG/1
40 TABLET, DELAYED RELEASE ORAL 2 TIMES DAILY
Qty: 28 TABLET | Refills: 0 | Status: SHIPPED | OUTPATIENT
Start: 2022-02-02 | End: 2022-02-16

## 2022-05-16 ENCOUNTER — VBI (OUTPATIENT)
Dept: ADMINISTRATIVE | Facility: OTHER | Age: 27
End: 2022-05-16

## 2022-06-28 ENCOUNTER — TELEPHONE (OUTPATIENT)
Dept: GASTROENTEROLOGY | Facility: CLINIC | Age: 27
End: 2022-06-28

## 2022-06-28 NOTE — TELEPHONE ENCOUNTER
Spoke to pt she didn't do the repeat stool test, but she will get the kit tomorrow and complete the test

## 2022-06-28 NOTE — TELEPHONE ENCOUNTER
Pt called asked to speak with Christin Rosario in regards to the medication discussed in the beginning of the year

## 2022-07-08 ENCOUNTER — TELEPHONE (OUTPATIENT)
Dept: OBGYN CLINIC | Facility: CLINIC | Age: 27
End: 2022-07-08

## 2022-07-08 ENCOUNTER — APPOINTMENT (OUTPATIENT)
Dept: LAB | Facility: HOSPITAL | Age: 27
End: 2022-07-08
Payer: COMMERCIAL

## 2022-07-08 DIAGNOSIS — N39.0 URINARY TRACT INFECTION WITHOUT HEMATURIA, SITE UNSPECIFIED: ICD-10-CM

## 2022-07-08 DIAGNOSIS — N39.0 URINARY TRACT INFECTION WITHOUT HEMATURIA, SITE UNSPECIFIED: Primary | ICD-10-CM

## 2022-07-08 LAB
BACTERIA UR QL AUTO: ABNORMAL /HPF
BILIRUB UR QL STRIP: NEGATIVE
CLARITY UR: CLEAR
COLOR UR: ABNORMAL
GLUCOSE UR STRIP-MCNC: NEGATIVE MG/DL
HGB UR QL STRIP.AUTO: NEGATIVE
KETONES UR STRIP-MCNC: NEGATIVE MG/DL
LEUKOCYTE ESTERASE UR QL STRIP: ABNORMAL
NITRITE UR QL STRIP: NEGATIVE
NON-SQ EPI CELLS URNS QL MICRO: ABNORMAL /HPF
PH UR STRIP.AUTO: 6.5 [PH]
PROT UR STRIP-MCNC: NEGATIVE MG/DL
RBC #/AREA URNS AUTO: ABNORMAL /HPF
SP GR UR STRIP.AUTO: <=1.005 (ref 1–1.03)
UROBILINOGEN UR QL STRIP.AUTO: 0.2 E.U./DL
WBC #/AREA URNS AUTO: ABNORMAL /HPF

## 2022-07-08 PROCEDURE — 81001 URINALYSIS AUTO W/SCOPE: CPT

## 2022-07-08 NOTE — TELEPHONE ENCOUNTER
Pt began 1 week ago with slight frequency and burning  Has elevated and today much urgency and burning  Order placed for u/a with c&s    She will go now  Allergy flagyl  Pharm [facility]  Last seen 12/6/21

## 2022-07-08 NOTE — TELEPHONE ENCOUNTER
Pt is having UTI symptoms and would like to have a prescription sent to the pharm  Please call pt to discuss

## 2022-07-11 NOTE — TELEPHONE ENCOUNTER
I am just getting this now, 3 days later  Urinalysis was run but culture was never collected  Please send back to lab for urine culture then  Macrobid 100mg po BID x 3 days  Please schedule yearly - she is VERY overdue and no-showed for her visit in March and was never rescheduled

## 2022-08-04 ENCOUNTER — APPOINTMENT (OUTPATIENT)
Dept: LAB | Facility: CLINIC | Age: 27
End: 2022-08-04
Payer: COMMERCIAL

## 2022-08-04 DIAGNOSIS — A04.8 H. PYLORI INFECTION: ICD-10-CM

## 2022-08-04 PROCEDURE — 87338 HPYLORI STOOL AG IA: CPT

## 2022-08-07 LAB — H PYLORI AG STL QL IA: POSITIVE

## 2022-08-08 ENCOUNTER — TELEPHONE (OUTPATIENT)
Dept: GASTROENTEROLOGY | Facility: CLINIC | Age: 27
End: 2022-08-08

## 2022-08-08 NOTE — TELEPHONE ENCOUNTER
Patients GI provider: NICOLETTE Brooks    Number to return call: [phone]    Reason for call: Pt calling stating she was positive for H pylori and would like to speak to someone   Pt disconnected before I could speak to anyone    Scheduled procedure/appointment date if applicable: Appt - 11/37/31

## 2022-08-09 NOTE — TELEPHONE ENCOUNTER
Spoke to ptn, explained to her what Yenifer Cox said   She is keeping her appointment for 8/17/2022 at 12:30pm

## 2022-08-09 NOTE — TELEPHONE ENCOUNTER
She has failed multiple prior treatments for h pylori (clarithromycin based triple therapy, quadruple therapy with Flagyl/Tetra/Bismuth/PPI, Rifabutin based therapy, and Levaquin based therapy)  I would recommend a repeat EGD with biopsies of her stomach for h pylori with cultures for antibiotic sensitivity testing as the next step

## 2022-08-11 ENCOUNTER — TELEPHONE (OUTPATIENT)
Dept: OBGYN CLINIC | Facility: CLINIC | Age: 27
End: 2022-08-11

## 2022-08-11 DIAGNOSIS — N30.00 ACUTE CYSTITIS WITHOUT HEMATURIA: Primary | ICD-10-CM

## 2022-08-11 NOTE — TELEPHONE ENCOUNTER
Pt states she called last month for same sx of UTI- they never ran the culture - order still in chart  No abx ever sent in  UTI sx and pressure- no fever or flank pain though  Updated pharmacy- advised to go do that culture order and I would run by [place] to see if she wanted to send in meds

## 2022-08-11 NOTE — TELEPHONE ENCOUNTER
I called in to R/A, bactrim ds, # 6, bid x 3   I also called in metrogel 0 75, 1 appl, sig in vagina hs x 5   Pt aware

## 2022-08-11 NOTE — TELEPHONE ENCOUNTER
Called pt- per communication consent, L/M informing pt of Metrogel & bactrim to be order to pharmacy , per DR Caitlyn Hill  Advised pt to call  Pharmacy prior to picking up medication   Advised to call with any further questions/concerns

## 2022-08-11 NOTE — TELEPHONE ENCOUNTER
So sorry - but I have not spoken to pt till now  She has had sx of bv for a while   Bad odor, grey dsch, fishy smell  [facility] used to order Metrogel  May I order it along with bactrim  ?  Thank you

## 2022-08-15 ENCOUNTER — APPOINTMENT (OUTPATIENT)
Dept: LAB | Facility: HOSPITAL | Age: 27
End: 2022-08-15
Payer: COMMERCIAL

## 2022-08-15 DIAGNOSIS — N30.00 ACUTE CYSTITIS WITHOUT HEMATURIA: ICD-10-CM

## 2022-08-15 PROCEDURE — 87086 URINE CULTURE/COLONY COUNT: CPT

## 2022-08-15 PROCEDURE — 87186 SC STD MICRODIL/AGAR DIL: CPT

## 2022-08-15 PROCEDURE — 87077 CULTURE AEROBIC IDENTIFY: CPT

## 2022-08-17 ENCOUNTER — OFFICE VISIT (OUTPATIENT)
Dept: GASTROENTEROLOGY | Facility: CLINIC | Age: 27
End: 2022-08-17
Payer: COMMERCIAL

## 2022-08-17 VITALS
DIASTOLIC BLOOD PRESSURE: 70 MMHG | HEART RATE: 76 BPM | WEIGHT: 173 LBS | SYSTOLIC BLOOD PRESSURE: 108 MMHG | HEIGHT: 61 IN | BODY MASS INDEX: 32.66 KG/M2 | OXYGEN SATURATION: 98 %

## 2022-08-17 DIAGNOSIS — R11.0 NAUSEA: ICD-10-CM

## 2022-08-17 DIAGNOSIS — R10.13 EPIGASTRIC PAIN: ICD-10-CM

## 2022-08-17 DIAGNOSIS — A04.8 H. PYLORI INFECTION: Primary | ICD-10-CM

## 2022-08-17 PROCEDURE — 99214 OFFICE O/P EST MOD 30 MIN: CPT | Performed by: PHYSICIAN ASSISTANT

## 2022-08-17 RX ORDER — PANTOPRAZOLE SODIUM 40 MG/1
40 TABLET, DELAYED RELEASE ORAL DAILY
Qty: 30 TABLET | Refills: 1 | Status: SHIPPED | OUTPATIENT
Start: 2022-08-17 | End: 2022-09-19

## 2022-08-17 NOTE — PROGRESS NOTES
[facility] - Outpatient Follow-up Note  Milli Renee 28 y o  female MRN: [id number]  Encounter: [id number]          ASSESSMENT AND PLAN:      1  H  pylori infection  2  Epigastric pain  3  Nausea    Patient presents for follow up  She continues to struggle with recurrent epigastric discomfort/LUQ discomfort, heartburn, and nausea  She has a history of h pylori gastritis noted on EGD 1 1/2 years ago  She was previously treated with Clarithromycin based triple therapy which was not successful, then quadruple therapy which was not successful, Rifabutin salvage therapy which was not successful, and most recently Levaquin based therapy which was not successful  Follow up stool h pylori 8/4 was normal   Ultrasound in July of 2021 showed a normal gallbladder  She reports partial improvement with PPI courses in the past     Will plan for repeat EGD with biopsies for the h pylori to plan to send for culture/antibiotic sensitivity testing to determine further treatment recommendations  Will also check a gastric emptying study for gastroparesis  Pantoprazole 40mg po daily  ______________________________________________________________________    SUBJECTIVE:  Patient is a pleasant 28year old female who presents to the office for follow up  She has a history of h pylori and has failed multiple treatment regimens  She has epigastric and LUQ discomfort, heartburn, and nausea  No vomiting  No melena  No unintentional weight loss  She reports partial improvement with PPI courses in the past       REVIEW OF SYSTEMS IS OTHERWISE NEGATIVE        Historical Information   Past Medical History:   Diagnosis Date    Anemia complicating pregnancy 55/60/2378    Anxiety     Asthma     Cerebral concussion 03/17/2017    GERD (gastroesophageal reflux disease)     Group B Streptococcus carrier, antepartum 12/01/2017    Head injury     Herpes     Migraine     Postpartum depression     Secondary amenorrhea 2020    Varicella     vaccine     Past Surgical History:   Procedure Laterality Date    APPENDECTOMY       SECTION      HEMORROIDECTOMY  2021    INDUCED   2012 2020    X 2    [place]  DELIVERY ONLY N/A 2017    Procedure:  SECTION (); Surgeon: Lilli Durán DO;  [facility];  Service: Obstetrics     Social History   Social History     Substance and Sexual Activity   Alcohol Use Yes    Comment: socially      Social History     Substance and Sexual Activity   Drug Use No     Social History     Tobacco Use   Smoking Status Never Smoker   Smokeless Tobacco Never Used     Family History   Problem Relation Age of Onset    Asthma Mother     Depression Mother    [facility] Migraines Mother     Diabetes type II Mother     Other Mother         vertigo    Diabetes Maternal Aunt     Hypertension Maternal Aunt     Diabetes Maternal Grandmother     Hypertension Maternal Grandmother     Breast cancer Maternal Grandmother     Migraines Father     Heart murmur Brother     Lung cancer Paternal Grandmother     Stroke Paternal Grandmother        Meds/Allergies       Current Outpatient Medications:     pantoprazole (PROTONIX) 40 mg tablet    Allergies   Allergen Reactions    Flagyl [Metronidazole] Nausea Only    Macrobid [Nitrofurantoin] Nausea Only           Objective     Blood pressure 108/70, pulse 76, height 5' 1" (1 549 m), weight 78 5 kg (173 lb), SpO2 98 %, unknown if currently breastfeeding  Body mass index is 32 69 kg/m²  PHYSICAL EXAM:      General Appearance:   Alert, cooperative, no distress   HEENT:   Normocephalic, atraumatic, anicteric      Neck:  Supple, symmetrical, trachea midline   Lungs:   Clear to auscultation bilaterally; no rales, rhonchi or wheezing; respirations unlabored    Heart[de-identified]   Regular rate and rhythm; no murmur, rub, or gallop     Abdomen:   Soft, non-tender, non-distended; normal bowel sounds; no masses, no organomegaly    Genitalia:   Deferred    Rectal:   Deferred    Extremities:  No cyanosis, clubbing or edema    Pulses:  2+ and symmetric    Skin:  No jaundice, rashes, or lesions    Lymph nodes:  No palpable cervical lymphadenopathy        Lab Results:   No visits with results within 1 Day(s) from this visit  Latest known visit with results is:   Appointment on 08/15/2022   Component Date Value    Urine Culture 08/15/2022 >100,000 cfu/ml Klebsiella pneumoniae (A)         Radiology Results:   No results found

## 2022-08-18 LAB — BACTERIA UR CULT: ABNORMAL

## 2022-08-24 ENCOUNTER — TELEPHONE (OUTPATIENT)
Dept: OBGYN CLINIC | Facility: CLINIC | Age: 27
End: 2022-08-24

## 2022-08-24 NOTE — TELEPHONE ENCOUNTER
Pt given rs'x for uti & BV (CS),     pt now has a Syriac,  Having just an itch sonce last night 8/23,  pls call pt to advisde,  Thanks

## 2022-09-12 RX ORDER — IBUPROFEN 800 MG/1
800 TABLET ORAL EVERY 6 HOURS PRN
COMMUNITY
Start: 2022-09-04 | End: 2022-09-13 | Stop reason: ALTCHOICE

## 2022-09-13 ENCOUNTER — OFFICE VISIT (OUTPATIENT)
Dept: OBGYN CLINIC | Facility: MEDICAL CENTER | Age: 27
End: 2022-09-13
Payer: COMMERCIAL

## 2022-09-13 ENCOUNTER — OFFICE VISIT (OUTPATIENT)
Dept: FAMILY MEDICINE CLINIC | Facility: CLINIC | Age: 27
End: 2022-09-13
Payer: COMMERCIAL

## 2022-09-13 VITALS
BODY MASS INDEX: 33.3 KG/M2 | OXYGEN SATURATION: 99 % | WEIGHT: 176.4 LBS | DIASTOLIC BLOOD PRESSURE: 78 MMHG | HEART RATE: 76 BPM | SYSTOLIC BLOOD PRESSURE: 118 MMHG | HEIGHT: 61 IN

## 2022-09-13 VITALS
WEIGHT: 177 LBS | BODY MASS INDEX: 33.42 KG/M2 | SYSTOLIC BLOOD PRESSURE: 110 MMHG | DIASTOLIC BLOOD PRESSURE: 72 MMHG | HEIGHT: 61 IN

## 2022-09-13 DIAGNOSIS — Z13.1 SCREENING FOR DIABETES MELLITUS: ICD-10-CM

## 2022-09-13 DIAGNOSIS — F41.9 ANXIETY: ICD-10-CM

## 2022-09-13 DIAGNOSIS — N76.0 BV (BACTERIAL VAGINOSIS): ICD-10-CM

## 2022-09-13 DIAGNOSIS — D64.9 ANEMIA, UNSPECIFIED TYPE: ICD-10-CM

## 2022-09-13 DIAGNOSIS — K21.9 GASTROESOPHAGEAL REFLUX DISEASE WITHOUT ESOPHAGITIS: ICD-10-CM

## 2022-09-13 DIAGNOSIS — B96.89 BV (BACTERIAL VAGINOSIS): ICD-10-CM

## 2022-09-13 DIAGNOSIS — Z13.6 SCREENING FOR CARDIOVASCULAR CONDITION: ICD-10-CM

## 2022-09-13 DIAGNOSIS — G43.009 MIGRAINE WITHOUT AURA AND WITHOUT STATUS MIGRAINOSUS, NOT INTRACTABLE: ICD-10-CM

## 2022-09-13 DIAGNOSIS — F41.8 DEPRESSION WITH ANXIETY: ICD-10-CM

## 2022-09-13 DIAGNOSIS — F41.0 PANIC ATTACK: ICD-10-CM

## 2022-09-13 DIAGNOSIS — Z11.3 SCREEN FOR STD (SEXUALLY TRANSMITTED DISEASE): Primary | ICD-10-CM

## 2022-09-13 DIAGNOSIS — Z00.00 ANNUAL PHYSICAL EXAM: Primary | ICD-10-CM

## 2022-09-13 PROCEDURE — 99395 PREV VISIT EST AGE 18-39: CPT | Performed by: FAMILY MEDICINE

## 2022-09-13 PROCEDURE — 87491 CHLMYD TRACH DNA AMP PROBE: CPT | Performed by: STUDENT IN AN ORGANIZED HEALTH CARE EDUCATION/TRAINING PROGRAM

## 2022-09-13 PROCEDURE — 81514 NFCT DS BV&VAGINITIS DNA ALG: CPT | Performed by: STUDENT IN AN ORGANIZED HEALTH CARE EDUCATION/TRAINING PROGRAM

## 2022-09-13 PROCEDURE — 87591 N.GONORRHOEAE DNA AMP PROB: CPT | Performed by: STUDENT IN AN ORGANIZED HEALTH CARE EDUCATION/TRAINING PROGRAM

## 2022-09-13 PROCEDURE — 99213 OFFICE O/P EST LOW 20 MIN: CPT | Performed by: STUDENT IN AN ORGANIZED HEALTH CARE EDUCATION/TRAINING PROGRAM

## 2022-09-13 RX ORDER — SERTRALINE HYDROCHLORIDE 25 MG/1
25 TABLET, FILM COATED ORAL DAILY
Qty: 30 TABLET | Refills: 0 | Status: SHIPPED | OUTPATIENT
Start: 2022-09-13

## 2022-09-13 RX ORDER — SUMATRIPTAN 100 MG/1
100 TABLET, FILM COATED ORAL ONCE AS NEEDED
Qty: 9 TABLET | Refills: 3 | Status: SHIPPED | OUTPATIENT
Start: 2022-09-13

## 2022-09-13 RX ORDER — METRONIDAZOLE 7.5 MG/G
1 GEL VAGINAL 2 TIMES WEEKLY
Qty: 70 G | Refills: 0 | Status: SHIPPED | OUTPATIENT
Start: 2022-09-15

## 2022-09-13 RX ORDER — METRONIDAZOLE 500 MG/1
500 TABLET ORAL EVERY 12 HOURS SCHEDULED
Qty: 14 TABLET | Refills: 0 | Status: SHIPPED | OUTPATIENT
Start: 2022-09-13 | End: 2022-09-20

## 2022-09-13 RX ORDER — ALPRAZOLAM 0.25 MG/1
0.25 TABLET ORAL 2 TIMES DAILY PRN
Qty: 20 TABLET | Refills: 0 | Status: SHIPPED | OUTPATIENT
Start: 2022-09-13 | End: 2022-09-23

## 2022-09-13 NOTE — PATIENT INSTRUCTIONS

## 2022-09-13 NOTE — PROGRESS NOTES
Preet De Le[address]    NAME: Tereso Irizarry  AGE: 28 y o  SEX: female  : 1995     DATE: 2022     Assessment and Plan:     Problem List Items Addressed This Visit        Digestive    Gastroesophageal reflux disease without esophagitis       Cardiovascular and Mediastinum    Migrainous headache without aura    Relevant Medications    SUMAtriptan (Imitrex) 100 mg tablet    sertraline (Zoloft) 25 mg tablet       Other    Anxiety    Relevant Medications    sertraline (Zoloft) 25 mg tablet    ALPRAZolam (XANAX) 0 25 mg tablet    Other Relevant Orders    Amb[facility]    Depression with anxiety    Relevant Medications    sertraline (Zoloft) 25 mg tablet    ALPRAZolam (XANAX) 0 25 mg tablet    Other Relevant Orders    Ambulatory Referral to [facility]    BMI 33 0-33 9,adult    Screening for cardiovascular condition    Relevant Orders    CBC and differential    Comprehensive metabolic panel    Lipid panel    TSH, 3rd generation    Anemia    Relevant Orders    CBC and differential    Ferritin    Screening for diabetes mellitus - Primary    Relevant Orders    Comprehensive metabolic panel    Hemoglobin A1C    Panic attack     Alprazolam prn use , [facility] website checked          Relevant Medications    sertraline (Zoloft) 25 mg tablet    Other Relevant Orders    Ambulatory Referral to Behavioral Health      Depression Screening Follow-up Plan: Patient's depression screening was positive with a PHQ-2 score of 3  Their PHQ-9 score was 14  Patient assessed for underlying major depression  They have no active suicidal ideations  Brief counseling provided and recommend additional follow-up/re-evaluation next office visit  Immunizations and preventive care screenings were discussed with patient today  Appropriate education was printed on patient's after visit summary      Counseling:  Dental Health: discussed importance of regular tooth brushing, flossing, and dental visits  Sexual health: discussed sexually transmitted diseases, partner selection, use of condoms, avoidance of unintended pregnancy, and contraceptive alternatives  Exercise: the importance of regular exercise/physical activity was discussed  Recommend exercise 3-5 times per week for at least 30 minutes  Return in about 10 days (around 9/23/2022) for Recheck  Chief Complaint:     Chief Complaint   Patient presents with    Physical Exam      History of Present Illness:     Adult Annual Physical   Patient here for a comprehensive physical exam  The patient reports problems - migrain   She has seen neurologist in the past and was given migraine headache medication and now she is out of medication and wants to get some refills and she think it was Imitrex,  Anxiety through the roof , could not find a therapist , panic attack  3 days   Feels tired all the time ,   She also feels depressed all the time  Diet and Physical Activity  Diet/Nutrition: well balanced diet  Exercise: walking  Depression Screening  PHQ-2/9 Depression Screening    Little interest or pleasure in doing things: 1 - several days  Feeling down, depressed, or hopeless: 2 - more than half the days  Trouble falling or staying asleep, or sleeping too much: 0 - not at all  Feeling tired or having little energy: 3 - nearly every day  Poor appetite or overeating: 3 - nearly every day  Feeling bad about yourself - or that you are a failure or have let yourself or your family down: 3 - nearly every day  Trouble concentrating on things, such as reading the newspaper or watching television: 2 - more than half the days  Moving or speaking so slowly that other people could have noticed   Or the opposite - being so fidgety or restless that you have been moving around a lot more than usual: 0 - not at all  Thoughts that you would be better off dead, or of hurting yourself in some way: 0 - not at all  PHQ-2 Score: 3  PHQ-2 Interpretation: POSITIVE depression screen  PHQ-9 Score: 14   PHQ-9 Interpretation: Moderate depression        General Health  Sleep: sleeps well  Hearing: normal - bilateral   Vision: wears glasses  Dental: no dental visits for >1 year  /GYN Health  Last menstrual period: month ago,  Contraceptive method: barrier methods  History of STDs?: no      Review of Systems:     Review of Systems   Constitutional: Positive for fatigue  Negative for activity change, appetite change, chills, fever and unexpected weight change  HENT: Negative for congestion, ear discharge, ear pain, nosebleeds, postnasal drip, rhinorrhea, sinus pressure, sneezing, sore throat, trouble swallowing and voice change  Eyes: Negative for photophobia, pain, discharge, redness and itching  Respiratory: Negative for cough, chest tightness, shortness of breath and wheezing  Cardiovascular: Negative for chest pain, palpitations and leg swelling  Gastrointestinal: Negative for abdominal pain, constipation, diarrhea, nausea and vomiting  Endocrine: Negative for polyuria  Genitourinary: Negative for dysuria, frequency and urgency  Musculoskeletal: Negative for arthralgias, back pain, myalgias and neck pain  Skin: Negative for color change, pallor and rash  Allergic/Immunologic: Negative for environmental allergies and food allergies  Neurological: Positive for headaches  Negative for dizziness, weakness and light-headedness  Hematological: Negative for adenopathy  Does not bruise/bleed easily  Psychiatric/Behavioral: Negative for behavioral problems  The patient is not nervous/anxious         Past Medical History:     Past Medical History:   Diagnosis Date    Anemia complicating pregnancy 46/49/9791    Anxiety     Asthma     Cerebral concussion 03/17/2017    GERD (gastroesophageal reflux disease)     Group B Streptococcus carrier, antepartum 12/01/2017    Head injury     Herpes     Migraine     Postpartum depression     Secondary amenorrhea 2020    Varicella     vaccine      Past Surgical History:     Past Surgical History:   Procedure Laterality Date    APPENDECTOMY       SECTION      HEMORROIDECTOMY  2021    INDUCED   2012 2020    X 2    [place]  DELIVERY ONLY N/A 2017    Procedure:  SECTION ();   Surgeon: Niraj Espinal DO;  Location: [facility];  Service: Obstetrics      Social History:     Social History     Socioeconomic History    Marital status: Single     Spouse name: None    Number of children: None    Years of education: None    Highest education level: None   Occupational History    None   Tobacco Use    Smoking status: Never Smoker    Smokeless tobacco: Never Used   Vaping Use    Vaping Use: Some days    Substances: Flavoring   Substance and Sexual Activity    Alcohol use: Yes     Comment: socially     Drug use: No    Sexual activity: Yes     Partners: Male   Other Topics Concern    None   Social History Narrative    Hx of recent travel      Social Determinants of Health     Financial Resource Strain: Not on file   Food Insecurity: Not on file   Transportation Needs: Not on file   Physical Activity: Not on file   Stress: Not on file   Social Connections: Not on file   Intimate Partner Violence: Not on file   Housing Stability: Not on file      Family History:     Family History   Problem Relation Age of Onset    Asthma Mother     Depression Mother     Migraines Mother     Diabetes type II Mother     Other Mother         vertigo    Diabetes Maternal Aunt     Hypertension Maternal Aunt     Diabetes Maternal Grandmother     Hypertension Maternal Grandmother     Breast cancer Maternal Grandmother     Migraines Father     Heart murmur Brother     Lung cancer Paternal Grandmother     Stroke Paternal Grandmother       Current Medications:     Current Outpatient Medications   Medication Sig Dispense Refill    ALPRAZolam (XANAX) 0 25 mg tablet Take 1 tablet (0 25 mg total) by mouth 2 (two) times a day as needed for anxiety for up to 10 days 20 tablet 0    pantoprazole (PROTONIX) 40 mg tablet Take 1 tablet (40 mg total) by mouth daily 30 tablet 1    sertraline (Zoloft) 25 mg tablet Take 1 tablet (25 mg total) by mouth daily 30 tablet 0    SUMAtriptan (Imitrex) 100 mg tablet Take 1 tablet (100 mg total) by mouth once as needed for migraine for up to 1 dose 9 tablet 3     No current facility-administered medications for this visit  Allergies: Allergies   Allergen Reactions    Flagyl [Metronidazole] Nausea Only    Macrobid [Nitrofurantoin] Nausea Only      Physical Exam:     /78   Pulse 76   Ht 5' 1" (1 549 m)   Wt 80 kg (176 lb 6 4 oz)   SpO2 99%   BMI 33 33 kg/m²     Physical Exam  Vitals and nursing note reviewed  Constitutional:       General: She is not in acute distress  Appearance: Normal appearance  She is not ill-appearing  HENT:      Head: Normocephalic and atraumatic  Right Ear: Tympanic membrane and ear canal normal  There is no impacted cerumen  Left Ear: Tympanic membrane and ear canal normal  There is no impacted cerumen  Nose: Nose normal  No congestion or rhinorrhea  Mouth/Throat:      Mouth: Mucous membranes are moist       Pharynx: Oropharynx is clear  No oropharyngeal exudate or posterior oropharyngeal erythema  Eyes:      General: No scleral icterus  Right eye: No discharge  Left eye: No discharge  Extraocular Movements: Extraocular movements intact  Conjunctiva/sclera: Conjunctivae normal       Pupils: Pupils are equal, round, and reactive to light  Cardiovascular:      Rate and Rhythm: Normal rate and regular rhythm  Heart sounds: Normal heart sounds  No murmur heard  Pulmonary:      Effort: Pulmonary effort is normal       Breath sounds: Normal breath sounds  No wheezing or rales     Abdominal: General: Abdomen is flat  Bowel sounds are normal  There is no distension  Palpations: Abdomen is soft  There is no mass  Tenderness: There is no abdominal tenderness  Musculoskeletal:         General: No swelling, tenderness or deformity  Normal range of motion  Cervical back: Normal range of motion and neck supple  No muscular tenderness  Right lower leg: No edema  Left lower leg: No edema  Lymphadenopathy:      Cervical: No cervical adenopathy  Skin:     Coloration: Skin is not jaundiced or pale  Findings: No erythema, lesion or rash  Neurological:      General: No focal deficit present  Mental Status: She is alert and oriented to person, place, and time        Gait: Gait normal    Psychiatric:         Mood and Affect: Mood normal          Behavior: Behavior normal           Raúl Aguilar MD   [place] [id number]

## 2022-09-14 LAB
C GLABRATA DNA VAG QL NAA+PROBE: NEGATIVE
C KRUSEI DNA VAG QL NAA+PROBE: NEGATIVE
C TRACH DNA SPEC QL NAA+PROBE: NEGATIVE
CANDIDA SP 6 PNL VAG NAA+PROBE: NEGATIVE
N GONORRHOEA DNA SPEC QL NAA+PROBE: NEGATIVE
T VAGINALIS DNA VAG QL NAA+PROBE: NEGATIVE
VAGINOSIS/ITIS DNA PNL VAG PROBE+SIG AMP: POSITIVE

## 2022-09-18 RX ORDER — SODIUM CHLORIDE, SODIUM LACTATE, POTASSIUM CHLORIDE, CALCIUM CHLORIDE 600; 310; 30; 20 MG/100ML; MG/100ML; MG/100ML; MG/100ML
125 INJECTION, SOLUTION INTRAVENOUS CONTINUOUS
Status: CANCELLED | OUTPATIENT
Start: 2022-09-18

## 2022-09-19 ENCOUNTER — ANESTHESIA (OUTPATIENT)
Dept: GASTROENTEROLOGY | Facility: HOSPITAL | Age: 27
End: 2022-09-19

## 2022-09-19 ENCOUNTER — ANESTHESIA EVENT (OUTPATIENT)
Dept: GASTROENTEROLOGY | Facility: HOSPITAL | Age: 27
End: 2022-09-19

## 2022-09-19 ENCOUNTER — HOSPITAL ENCOUNTER (OUTPATIENT)
Dept: GASTROENTEROLOGY | Facility: HOSPITAL | Age: 27
Setting detail: OUTPATIENT SURGERY
Discharge: HOME | End: 2022-09-19
Payer: COMMERCIAL

## 2022-09-19 VITALS
HEIGHT: 61 IN | SYSTOLIC BLOOD PRESSURE: 108 MMHG | WEIGHT: 179.68 LBS | TEMPERATURE: 99 F | BODY MASS INDEX: 33.92 KG/M2 | DIASTOLIC BLOOD PRESSURE: 63 MMHG | RESPIRATION RATE: 18 BRPM | OXYGEN SATURATION: 100 % | HEART RATE: 73 BPM

## 2022-09-19 DIAGNOSIS — A04.8 H. PYLORI INFECTION: ICD-10-CM

## 2022-09-19 LAB
EXT PREGNANCY TEST URINE: NEGATIVE
EXT. CONTROL: NORMAL

## 2022-09-19 PROCEDURE — 81025 URINE PREGNANCY TEST: CPT | Performed by: ANESTHESIOLOGY

## 2022-09-19 PROCEDURE — 88305 TISSUE EXAM BY PATHOLOGIST: CPT | Performed by: PATHOLOGY

## 2022-09-19 PROCEDURE — 43239 EGD BIOPSY SINGLE/MULTIPLE: CPT | Performed by: INTERNAL MEDICINE

## 2022-09-19 PROCEDURE — 88342 IMHCHEM/IMCYTCHM 1ST ANTB: CPT | Performed by: PATHOLOGY

## 2022-09-19 RX ORDER — SODIUM CHLORIDE, SODIUM LACTATE, POTASSIUM CHLORIDE, CALCIUM CHLORIDE 600; 310; 30; 20 MG/100ML; MG/100ML; MG/100ML; MG/100ML
125 INJECTION, SOLUTION INTRAVENOUS CONTINUOUS
Status: DISCONTINUED | OUTPATIENT
Start: 2022-09-19 | End: 2022-09-23 | Stop reason: HOSPADM

## 2022-09-19 RX ORDER — PROPOFOL 10 MG/ML
INJECTION, EMULSION INTRAVENOUS AS NEEDED
Status: DISCONTINUED | OUTPATIENT
Start: 2022-09-19 | End: 2022-09-19

## 2022-09-19 RX ORDER — LIDOCAINE HYDROCHLORIDE 20 MG/ML
INJECTION, SOLUTION EPIDURAL; INFILTRATION; INTRACAUDAL; PERINEURAL AS NEEDED
Status: DISCONTINUED | OUTPATIENT
Start: 2022-09-19 | End: 2022-09-19

## 2022-09-19 RX ADMIN — PROPOFOL 20 MG: 10 INJECTION, EMULSION INTRAVENOUS at 11:29

## 2022-09-19 RX ADMIN — LIDOCAINE HYDROCHLORIDE 100 MG: 20 INJECTION, SOLUTION EPIDURAL; INFILTRATION; INTRACAUDAL at 11:27

## 2022-09-19 RX ADMIN — SODIUM CHLORIDE, SODIUM LACTATE, POTASSIUM CHLORIDE, AND CALCIUM CHLORIDE: .6; .31; .03; .02 INJECTION, SOLUTION INTRAVENOUS at 11:26

## 2022-09-19 RX ADMIN — PROPOFOL 150 MG: 10 INJECTION, EMULSION INTRAVENOUS at 11:27

## 2022-09-19 RX ADMIN — SODIUM CHLORIDE, SODIUM LACTATE, POTASSIUM CHLORIDE, AND CALCIUM CHLORIDE 125 ML/HR: .6; .31; .03; .02 INJECTION, SOLUTION INTRAVENOUS at 10:10

## 2022-09-19 NOTE — H&P
History and Physical -  [facility]  Helen Fuchs 28 y o  female MRN: [id number]                  HPI: Helen Fuchs is a 28y o  year old female who presents for EGD for epigastric pain, nausea, history of H pylori      REVIEW OF SYSTEMS: Per the HPI, and otherwise unremarkable  Historical Information   Past Medical History:   Diagnosis Date    Anemia 95/3/2163    Anemia complicating pregnancy     Anxiety     Asthma     Cerebral concussion 2017    Depression     GERD (gastroesophageal reflux disease)     Group B Streptococcus carrier, antepartum 2017    Head injury     Herpes     Migraine     Postpartum depression     Secondary amenorrhea 2020    Varicella     vaccine     Past Surgical History:   Procedure Laterality Date    APPENDECTOMY       SECTION      COSMETIC SURGERY      liposuction and buttocks    EGD      HEMORROIDECTOMY  2021    INDUCED   2012 2020    X 2    [place]  DELIVERY ONLY N/A 2017    Procedure:  SECTION ();   Surgeon: Chencho Dupont DO;  Location: [facility];  Service: Obstetrics     Social History   Social History     Substance and Sexual Activity   Alcohol Use Yes    Comment: socially      Social History     Substance and Sexual Activity   Drug Use No     Social History     Tobacco Use   Smoking Status Former Smoker    Quit date: 2014    Years since quittin 7   Smokeless Tobacco Never Used     Family History   Problem Relation Age of Onset   [facility] Asthma Mother     Depression Mother    [facility] Migraines Mother     Diabetes type II Mother     Other Mother         vertigo    Diabetes Maternal Aunt     Hypertension Maternal Aunt     Diabetes Maternal Grandmother     Hypertension Maternal Grandmother     Breast cancer Maternal Grandmother     Migraines Father     Heart murmur Brother     Lung cancer Paternal Grandmother     Stroke Paternal Grandmother        Meds/Allergies (Not in a hospital admission)      Allergies   Allergen Reactions    Flagyl [Metronidazole] Nausea Only    Macrobid [Nitrofurantoin] Nausea Only       Objective     Blood pressure 104/67, pulse 70, temperature 99 °F (37 2 °C), resp  rate 18, height 5' 1" (1 549 m), weight 81 5 kg (179 lb 10 8 oz), last menstrual period 08/22/2022, SpO2 98 %, unknown if currently breastfeeding        PHYSICAL EXAM    Gen: NAD  CV: RRR  CHEST: Clear  ABD: soft, NT/ND  EXT: no edema  Neuro: AAO      ASSESSMENT/PLAN:  This is a 28y o  year old female here for epigastric pain, nausea, history of H pylori    PLAN:   Procedure:  EGD

## 2022-09-19 NOTE — ANESTHESIA POSTPROCEDURE EVALUATION
Post-Op Assessment Note    CV Status:  Stable  Pain Score: 0    Pain management: adequate     Mental Status:  Alert and awake   Hydration Status:  Euvolemic   PONV Controlled:  Controlled   Airway Patency:  Patent      Post Op Vitals Reviewed: Yes      Staff: CRNA         No complications documented      /58 (09/19/22 1138)    Temp   99   Pulse 75 (09/19/22 1138)   Resp 20 (09/19/22 1138)    SpO2 100 % (09/19/22 1138)

## 2022-09-19 NOTE — ANESTHESIA PREPROCEDURE EVALUATION
Procedure:  EGD    Relevant Problems   CARDIO   (+) Migrainous headache without aura      GI/HEPATIC   (+) Gastroesophageal reflux disease without esophagitis      HEMATOLOGY   (+) Anemia      MUSCULOSKELETAL   (+) Chronic left-sided low back pain with bilateral sciatica      NEURO/PSYCH   (+) Anxiety   (+) Chronic left-sided low back pain with bilateral sciatica   (+) Depression with anxiety   (+) Migrainous headache without aura   (+) Panic attack      Other   (+) BMI 33 0-33 9,adult        Physical Exam    Airway    Mallampati score: I  TM Distance: >3 FB  Neck ROM: full     Dental       Cardiovascular  Cardiovascular exam normal    Pulmonary  Pulmonary exam normal     Other Findings       Anemia 10/6/2017     Anemia complicating pregnancy 29/43/5810    Anxiety      Asthma      Cerebral concussion 03/17/2017    Depression      GERD (gastroesophageal reflux disease)      Group B Streptococcus carrier, antepartum 12/01/2017    Head injury      Herpes      Migraine      Postpartum depression      Secondary amenorrhea 02/24/2020    Varicella       vaccine       Anesthesia Plan  ASA Score- 2     Anesthesia Type- IV sedation with anesthesia with ASA Monitors  Additional Monitors:   Airway Plan:           Plan Factors-Exercise tolerance (METS): >4 METS  Chart reviewed  EKG reviewed  Imaging results reviewed  Existing labs reviewed  Patient summary reviewed  Induction- intravenous  Postoperative Plan-     Informed Consent- Anesthetic plan and risks discussed with patient  I personally reviewed this patient with the CRNA  Discussed and agreed on the Anesthesia Plan with the CRNA  Jaqueline Mosley

## 2022-09-19 NOTE — PROGRESS NOTES
Assessment/Plan:      Diagnoses and all orders for this visit:    Screen for STD (sexually transmitted disease)  -     Chlamydia/GC amplified DNA by PCR    BV (bacterial vaginosis)  -     Molecular Vaginal Panel  -     metroNIDAZOLE (FLAGYL) 500 mg tablet; Take 1 tablet (500 mg total) by mouth every 12 (twelve) hours for 7 days  -     metroNIDAZOLE (METROGEL) 0 75 % vaginal gel; Insert 1 application into the vagina 2 (two) times a week Use for 4 months          Subjective:     Patient ID: Alexandria Tan is a 28 y o  female  29 yo [id number] presents with recurrent BV symptoms  She reports foul odor, discharge, burning and discomfort  She was treated about a month ago for the same symptoms  It did resolve for a few days but the symptoms recurred  She is currently sexually w/o contraception  Review of Systems   All other systems reviewed and are negative  Objective:     Physical Exam  Vitals reviewed  Pulmonary:      Effort: Pulmonary effort is normal    Genitourinary:     Comments: + gray discharge  Microscopy +clue cells, whiff  Neurological:      Mental Status: She is alert and oriented to person, place, and time     Psychiatric:         Behavior: Behavior normal

## 2022-09-22 PROCEDURE — 88342 IMHCHEM/IMCYTCHM 1ST ANTB: CPT | Performed by: PATHOLOGY

## 2022-09-22 PROCEDURE — 88305 TISSUE EXAM BY PATHOLOGIST: CPT | Performed by: PATHOLOGY

## 2022-09-23 ENCOUNTER — TELEPHONE (OUTPATIENT)
Dept: OTHER | Facility: OTHER | Age: 27
End: 2022-09-23

## 2022-09-23 DIAGNOSIS — A04.8 H. PYLORI INFECTION: Primary | ICD-10-CM

## 2022-09-23 RX ORDER — AMOXICILLIN 250 MG/1
750 CAPSULE ORAL EVERY 6 HOURS
Qty: 168 CAPSULE | Refills: 0 | Status: SHIPPED | OUTPATIENT
Start: 2022-09-23 | End: 2022-10-07

## 2022-09-23 RX ORDER — OMEPRAZOLE 20 MG/1
20 CAPSULE, DELAYED RELEASE ORAL 4 TIMES DAILY
Qty: 56 CAPSULE | Refills: 0 | Status: SHIPPED | OUTPATIENT
Start: 2022-09-23 | End: 2022-10-07

## 2022-09-23 NOTE — TELEPHONE ENCOUNTER
Please call patient back at [phone]      Patient states that she just missed a phone call from Gillian Harvey and she was calling back to speak with her she has a few questions about her new treatment plan

## 2022-10-06 ENCOUNTER — PATIENT MESSAGE (OUTPATIENT)
Dept: GASTROENTEROLOGY | Facility: CLINIC | Age: 27
End: 2022-10-06

## 2022-10-06 ENCOUNTER — NURSE TRIAGE (OUTPATIENT)
Dept: OTHER | Facility: OTHER | Age: 27
End: 2022-10-06

## 2022-10-06 NOTE — TELEPHONE ENCOUNTER
Reason for Disposition   Caller has NON-URGENT medicine question about med that PCP or specialist prescribed and triager unable to answer question    Answer Assessment - Initial Assessment Questions  1  NAME of MEDICATION: "What medicine are you calling about?"      Amoxicillin   2  QUESTION: "What is your question?" (e g , medication refill, side effect)      Patient stated that she doesn't want to start on Amoxicillin  Patient requested an antibiotic instead which was prescribed before  3  PRESCRIBING HCP: "Who prescribed it?" Reason: if prescribed by specialist, call should be referred to that group  NICOLETTE Frank   4   SYMPTOMS: "Do you have any symptoms?"      Unspecified    Protocols used: MEDICATION QUESTION CALL-ADULT-[place]

## 2022-10-07 ENCOUNTER — TELEPHONE (OUTPATIENT)
Dept: GASTROENTEROLOGY | Facility: CLINIC | Age: 27
End: 2022-10-07

## 2022-10-07 NOTE — TELEPHONE ENCOUNTER
----- Message from Jose Biggs sent at 10/6/2022  1:34 PM EDT -----  Regarding: Medicine   Matthew Sayres    hope all is well with you  so I picked up the prescription you gave me yesterday but Im really not comfortable taking all of those antibiotics in one day I was wondering would you be able to put me on one of the courses I took before seeing as though i never even started one of them or completed a course to even eradicate the pylori   Karrie Hein Im definitely going to buckle down and finish start  the course this time  Please get back to me at your earliest convenience   thank you

## 2022-10-07 NOTE — TELEPHONE ENCOUNTER
Yes, we can certainly use a prior course instead if she did not start it/take it  Can you find out which regimen she did not take- was it the last one before this- that was the Levaquin/Amoxicillin/PPI regimen?

## 2022-10-07 NOTE — TELEPHONE ENCOUNTER
Spoke to patient by phone  We reviewed that all the regimens contain a significant amount of antibiotics due to the resistance we see with h pylori unfortunately  She reports she did not take the last regimen that was prescribed before this one which was the Levaquin-based treatment and she would rather take that regimen instead of the high dose dual treatment with Amoxicillin  We reviewed the regimen in detail  I sent the Levaquin 50 0mg by mouth daily, Amoxicillin 750mg by mouth three times a day, and Omeprazole 20mg by mouth twice a day for 14 days to the pharmacy  Can you let the pharmacy know that this is the regimen she will be taking and not the prior one just recently prescribed? Thanks!

## 2022-10-07 NOTE — TELEPHONE ENCOUNTER
Spoke w/ the pharmacy they are going to fill only Levaquin as she already picked up the other two medications

## 2022-11-12 PROBLEM — Z13.1 SCREENING FOR DIABETES MELLITUS: Status: RESOLVED | Noted: 2020-02-24 | Resolved: 2022-11-12

## 2022-11-12 PROBLEM — Z13.6 SCREENING FOR CARDIOVASCULAR CONDITION: Status: RESOLVED | Noted: 2018-10-11 | Resolved: 2022-11-12

## 2023-02-02 ENCOUNTER — APPOINTMENT (OUTPATIENT)
Dept: LAB | Facility: CLINIC | Age: 28
End: 2023-02-02

## 2023-02-02 DIAGNOSIS — Z13.1 SCREENING FOR DIABETES MELLITUS: ICD-10-CM

## 2023-02-02 DIAGNOSIS — Z13.6 SCREENING FOR CARDIOVASCULAR CONDITION: ICD-10-CM

## 2023-02-02 DIAGNOSIS — D64.9 ANEMIA, UNSPECIFIED TYPE: ICD-10-CM

## 2023-02-02 LAB
ALBUMIN SERPL BCP-MCNC: 3.3 G/DL (ref 3.5–5)
ALP SERPL-CCNC: 55 U/L (ref 46–116)
ALT SERPL W P-5'-P-CCNC: 19 U/L (ref 12–78)
ANION GAP SERPL CALCULATED.3IONS-SCNC: 4 MMOL/L (ref 4–13)
AST SERPL W P-5'-P-CCNC: 19 U/L (ref 5–45)
BASOPHILS # BLD AUTO: 0.05 THOUSANDS/ÂΜL (ref 0–0.1)
BASOPHILS NFR BLD AUTO: 1 % (ref 0–1)
BILIRUB SERPL-MCNC: 0.15 MG/DL (ref 0.2–1)
BUN SERPL-MCNC: 7 MG/DL (ref 5–25)
CALCIUM ALBUM COR SERPL-MCNC: 9.7 MG/DL (ref 8.3–10.1)
CALCIUM SERPL-MCNC: 9.1 MG/DL (ref 8.3–10.1)
CHLORIDE SERPL-SCNC: 108 MMOL/L (ref 96–108)
CHOLEST SERPL-MCNC: 175 MG/DL
CO2 SERPL-SCNC: 26 MMOL/L (ref 21–32)
CREAT SERPL-MCNC: 0.49 MG/DL (ref 0.6–1.3)
EOSINOPHIL # BLD AUTO: 0.24 THOUSAND/ÂΜL (ref 0–0.61)
EOSINOPHIL NFR BLD AUTO: 4 % (ref 0–6)
ERYTHROCYTE [DISTWIDTH] IN BLOOD BY AUTOMATED COUNT: 14.6 % (ref 11.6–15.1)
FERRITIN SERPL-MCNC: 5 NG/ML (ref 8–388)
GFR SERPL CREATININE-BSD FRML MDRD: 133 ML/MIN/1.73SQ M
GLUCOSE P FAST SERPL-MCNC: 87 MG/DL (ref 65–99)
HCT VFR BLD AUTO: 36.1 % (ref 34.8–46.1)
HDLC SERPL-MCNC: 50 MG/DL
HGB BLD-MCNC: 10.9 G/DL (ref 11.5–15.4)
IMM GRANULOCYTES # BLD AUTO: 0.01 THOUSAND/UL (ref 0–0.2)
IMM GRANULOCYTES NFR BLD AUTO: 0 % (ref 0–2)
LDLC SERPL CALC-MCNC: 94 MG/DL (ref 0–100)
LYMPHOCYTES # BLD AUTO: 2.19 THOUSANDS/ÂΜL (ref 0.6–4.47)
LYMPHOCYTES NFR BLD AUTO: 34 % (ref 14–44)
MCH RBC QN AUTO: 25.5 PG (ref 26.8–34.3)
MCHC RBC AUTO-ENTMCNC: 30.2 G/DL (ref 31.4–37.4)
MCV RBC AUTO: 84 FL (ref 82–98)
MONOCYTES # BLD AUTO: 0.64 THOUSAND/ÂΜL (ref 0.17–1.22)
MONOCYTES NFR BLD AUTO: 10 % (ref 4–12)
NEUTROPHILS # BLD AUTO: 3.29 THOUSANDS/ÂΜL (ref 1.85–7.62)
NEUTS SEG NFR BLD AUTO: 51 % (ref 43–75)
NONHDLC SERPL-MCNC: 125 MG/DL
NRBC BLD AUTO-RTO: 0 /100 WBCS
PLATELET # BLD AUTO: 306 THOUSANDS/UL (ref 149–390)
PMV BLD AUTO: 11.6 FL (ref 8.9–12.7)
POTASSIUM SERPL-SCNC: 4.5 MMOL/L (ref 3.5–5.3)
PROT SERPL-MCNC: 7.5 G/DL (ref 6.4–8.4)
RBC # BLD AUTO: 4.28 MILLION/UL (ref 3.81–5.12)
SODIUM SERPL-SCNC: 138 MMOL/L (ref 135–147)
TRIGL SERPL-MCNC: 157 MG/DL
TSH SERPL DL<=0.05 MIU/L-ACNC: 1.16 UIU/ML (ref 0.45–4.5)
WBC # BLD AUTO: 6.42 THOUSAND/UL (ref 4.31–10.16)

## 2023-02-03 ENCOUNTER — TELEPHONE (OUTPATIENT)
Dept: FAMILY MEDICINE CLINIC | Facility: CLINIC | Age: 28
End: 2023-02-03

## 2023-02-03 LAB
EST. AVERAGE GLUCOSE BLD GHB EST-MCNC: 108 MG/DL
HBA1C MFR BLD: 5.4 %

## 2023-02-03 NOTE — TELEPHONE ENCOUNTER
Mohsen Guillermo would like the results of the labs she had done on 2/2  [facility] and would like to speak with someone about the results

## 2023-02-07 NOTE — RESULT ENCOUNTER NOTE
Patient needs follow-up appointment, has  Abnormal blood work, please se other note I sent message to nurse yesterday

## 2023-02-08 ENCOUNTER — OFFICE VISIT (OUTPATIENT)
Dept: FAMILY MEDICINE CLINIC | Facility: CLINIC | Age: 28
End: 2023-02-08

## 2023-02-08 VITALS
DIASTOLIC BLOOD PRESSURE: 70 MMHG | RESPIRATION RATE: 16 BRPM | OXYGEN SATURATION: 97 % | HEIGHT: 61 IN | HEART RATE: 79 BPM | BODY MASS INDEX: 35.87 KG/M2 | WEIGHT: 190 LBS | SYSTOLIC BLOOD PRESSURE: 110 MMHG

## 2023-02-08 DIAGNOSIS — R53.83 OTHER FATIGUE: ICD-10-CM

## 2023-02-08 DIAGNOSIS — D50.9 IRON DEFICIENCY ANEMIA, UNSPECIFIED IRON DEFICIENCY ANEMIA TYPE: Primary | ICD-10-CM

## 2023-02-08 DIAGNOSIS — E78.1 HYPERTRIGLYCERIDEMIA: ICD-10-CM

## 2023-02-08 DIAGNOSIS — F41.9 ANXIETY: ICD-10-CM

## 2023-02-08 DIAGNOSIS — F41.0 PANIC ATTACK: ICD-10-CM

## 2023-02-08 PROBLEM — F41.8 DEPRESSION WITH ANXIETY: Status: RESOLVED | Noted: 2017-10-02 | Resolved: 2023-02-08

## 2023-02-08 RX ORDER — SACCHAROMYCES BOULARDII 250 MG
250 CAPSULE ORAL 2 TIMES DAILY
COMMUNITY

## 2023-02-08 RX ORDER — ALPRAZOLAM 0.25 MG/1
0.25 TABLET ORAL 2 TIMES DAILY PRN
Qty: 20 TABLET | Refills: 0 | Status: SHIPPED | OUTPATIENT
Start: 2023-02-08 | End: 2023-02-18

## 2023-02-08 RX ORDER — FERROUS SULFATE TAB EC 324 MG (65 MG FE EQUIVALENT) 324 (65 FE) MG
324 TABLET DELAYED RESPONSE ORAL
Qty: 90 TABLET | Refills: 3 | Status: SHIPPED | OUTPATIENT
Start: 2023-02-08
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
## 2023-02-08 NOTE — ASSESSMENT & PLAN NOTE
She never took the Zoloft, she only takes alprazolam as needed,  website is checked and refill is given 20 tablets

## 2023-02-08 NOTE — ASSESSMENT & PLAN NOTE
Her chronic fatigue is most likely due to iron deficiency anemia, she will take iron daily she will also take vitamin D 1000 units daily, recheck labs in 6 months  TSH is normal

## 2023-02-08 NOTE — ASSESSMENT & PLAN NOTE
Her iron ferritin is 5 which is very low, she is anemic, she gets her    Regular every month for 5 days and sometimes she has moderate bleeding, will start her on iron 1 tablet twice a day at least for a month and then daily and discussed the side effect of medication she should avoid constipation she can take a stool softener Colace or add more fiber in her diet recheck labs in 6 months

## 2023-02-22 ENCOUNTER — VBI (OUTPATIENT)
Dept: ADMINISTRATIVE | Facility: OTHER | Age: 28
End: 2023-02-22

## 2023-03-28 ENCOUNTER — APPOINTMENT (OUTPATIENT)
Dept: LAB | Facility: CLINIC | Age: 28
End: 2023-03-28

## 2023-03-28 DIAGNOSIS — L30.1 DYSHIDROSIS: ICD-10-CM

## 2023-03-28 DIAGNOSIS — L70.0 ACNE VULGARIS: ICD-10-CM

## 2023-03-28 LAB
ALBUMIN SERPL BCP-MCNC: 3.5 G/DL (ref 3.5–5)
ALP SERPL-CCNC: 45 U/L (ref 46–116)
ALT SERPL W P-5'-P-CCNC: 14 U/L (ref 12–78)
ANION GAP SERPL CALCULATED.3IONS-SCNC: 1 MMOL/L (ref 4–13)
AST SERPL W P-5'-P-CCNC: 14 U/L (ref 5–45)
BILIRUB SERPL-MCNC: 0.22 MG/DL (ref 0.2–1)
BUN SERPL-MCNC: 7 MG/DL (ref 5–25)
CALCIUM SERPL-MCNC: 9.1 MG/DL (ref 8.3–10.1)
CHLORIDE SERPL-SCNC: 111 MMOL/L (ref 96–108)
CO2 SERPL-SCNC: 25 MMOL/L (ref 21–32)
CREAT SERPL-MCNC: 0.47 MG/DL (ref 0.6–1.3)
GFR SERPL CREATININE-BSD FRML MDRD: 134 ML/MIN/1.73SQ M
GLUCOSE P FAST SERPL-MCNC: 80 MG/DL (ref 65–99)
POTASSIUM SERPL-SCNC: 4.2 MMOL/L (ref 3.5–5.3)
PROT SERPL-MCNC: 7.5 G/DL (ref 6.4–8.4)
SODIUM SERPL-SCNC: 137 MMOL/L (ref 135–147)

## 2023-05-01 ENCOUNTER — TELEPHONE (OUTPATIENT)
Dept: OBGYN CLINIC | Facility: CLINIC | Age: 28
End: 2023-05-01

## 2023-05-01 DIAGNOSIS — B96.89 BV (BACTERIAL VAGINOSIS): ICD-10-CM

## 2023-05-01 DIAGNOSIS — N76.0 BV (BACTERIAL VAGINOSIS): ICD-10-CM

## 2023-05-01 RX ORDER — METRONIDAZOLE 7.5 MG/G
GEL VAGINAL
Qty: 70 G | Refills: 0 | Status: SHIPPED | OUTPATIENT
Start: 2023-05-01

## 2023-05-01 NOTE — TELEPHONE ENCOUNTER
Pt last had bv 9/22  She has same odor , grey discharge and would like metrogel vag cream - pharm is R/A Stephane   Thank you

## 2023-05-17 RX ORDER — VENLAFAXINE HYDROCHLORIDE 75 MG/1
CAPSULE, EXTENDED RELEASE ORAL DAILY
COMMUNITY
Start: 2023-03-08 | End: 2023-05-18 | Stop reason: ALTCHOICE

## 2023-05-17 RX ORDER — TRETINOIN 0.5 MG/G
CREAM TOPICAL
COMMUNITY
Start: 2023-04-30

## 2023-05-17 RX ORDER — SERTRALINE HYDROCHLORIDE 25 MG/1
25 TABLET, FILM COATED ORAL DAILY
COMMUNITY

## 2023-05-17 RX ORDER — SPIRONOLACTONE 25 MG/1
TABLET ORAL
COMMUNITY
Start: 2023-03-28

## 2023-05-17 RX ORDER — QUETIAPINE FUMARATE 25 MG/1
25 TABLET, FILM COATED ORAL 2 TIMES DAILY
COMMUNITY
Start: 2023-02-08

## 2023-05-18 ENCOUNTER — TELEMEDICINE (OUTPATIENT)
Dept: FAMILY MEDICINE CLINIC | Facility: CLINIC | Age: 28
End: 2023-05-18

## 2023-05-18 VITALS — BODY MASS INDEX: 36.63 KG/M2 | WEIGHT: 194 LBS | HEIGHT: 61 IN

## 2023-05-18 DIAGNOSIS — F41.0 PANIC ATTACK: ICD-10-CM

## 2023-05-18 DIAGNOSIS — F41.9 ANXIETY: ICD-10-CM

## 2023-05-18 DIAGNOSIS — Z91.018 FOOD ALLERGY: Primary | ICD-10-CM

## 2023-05-18 DIAGNOSIS — G43.009 MIGRAINE WITHOUT AURA AND WITHOUT STATUS MIGRAINOSUS, NOT INTRACTABLE: ICD-10-CM

## 2023-05-18 RX ORDER — METHOCARBAMOL 750 MG/1
TABLET, FILM COATED ORAL
COMMUNITY
Start: 2023-05-14

## 2023-05-18 RX ORDER — IBUPROFEN 600 MG/1
TABLET ORAL
COMMUNITY
Start: 2023-05-14

## 2023-05-18 NOTE — ASSESSMENT & PLAN NOTE
She continues Zoloft and advised to see the psychiatrist to get the refills   She will find a new psychiatrist as her previous psychiatrist left
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
## 2023-05-31 ENCOUNTER — VBI (OUTPATIENT)
Dept: ADMINISTRATIVE | Facility: OTHER | Age: 28
End: 2023-05-31

## 2023-06-08 ENCOUNTER — TELEPHONE (OUTPATIENT)
Dept: OBGYN CLINIC | Facility: CLINIC | Age: 28
End: 2023-06-08

## 2023-06-08 DIAGNOSIS — N76.0 BV (BACTERIAL VAGINOSIS): Primary | ICD-10-CM

## 2023-06-08 DIAGNOSIS — N30.00 ACUTE CYSTITIS WITHOUT HEMATURIA: ICD-10-CM

## 2023-06-08 DIAGNOSIS — B96.89 BV (BACTERIAL VAGINOSIS): Primary | ICD-10-CM

## 2023-06-08 RX ORDER — SULFAMETHOXAZOLE AND TRIMETHOPRIM 800; 160 MG/1; MG/1
1 TABLET ORAL EVERY 12 HOURS SCHEDULED
Qty: 6 TABLET | Refills: 0 | Status: SHIPPED | OUTPATIENT
Start: 2023-06-08 | End: 2023-06-11

## 2023-06-08 RX ORDER — METRONIDAZOLE 7.5 MG/G
GEL TOPICAL
Qty: 45 G | Refills: 0 | Status: SHIPPED | OUTPATIENT
Start: 2023-06-08 | End: 2023-06-13

## 2023-06-08 NOTE — TELEPHONE ENCOUNTER
Patient called in stating she thinks her BV is back and she is also having UTI symptomes    Please advise
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
## 2023-06-12 ENCOUNTER — APPOINTMENT (OUTPATIENT)
Age: 28
End: 2023-06-12
Payer: COMMERCIAL

## 2023-06-12 DIAGNOSIS — D50.9 IRON DEFICIENCY ANEMIA, UNSPECIFIED IRON DEFICIENCY ANEMIA TYPE: ICD-10-CM

## 2023-06-12 DIAGNOSIS — Z91.018 FOOD ALLERGY: ICD-10-CM

## 2023-06-12 DIAGNOSIS — N30.00 ACUTE CYSTITIS WITHOUT HEMATURIA: ICD-10-CM

## 2023-06-12 DIAGNOSIS — A04.8 H. PYLORI INFECTION: ICD-10-CM

## 2023-06-12 DIAGNOSIS — N39.0 URINARY TRACT INFECTION WITHOUT HEMATURIA, SITE UNSPECIFIED: ICD-10-CM

## 2023-06-12 LAB
AMORPH URATE CRY URNS QL MICRO: ABNORMAL
BACTERIA UR QL AUTO: ABNORMAL /HPF
BILIRUB UR QL STRIP: NEGATIVE
BILIRUB UR QL STRIP: NEGATIVE
CLARITY UR: ABNORMAL
CLARITY UR: ABNORMAL
COLOR UR: ABNORMAL
COLOR UR: ABNORMAL
GLUCOSE UR STRIP-MCNC: NEGATIVE MG/DL
GLUCOSE UR STRIP-MCNC: NEGATIVE MG/DL
HGB UR QL STRIP.AUTO: NEGATIVE
HGB UR QL STRIP.AUTO: NEGATIVE
KETONES UR STRIP-MCNC: NEGATIVE MG/DL
KETONES UR STRIP-MCNC: NEGATIVE MG/DL
LEUKOCYTE ESTERASE UR QL STRIP: ABNORMAL
LEUKOCYTE ESTERASE UR QL STRIP: ABNORMAL
MUCOUS THREADS UR QL AUTO: ABNORMAL
NITRITE UR QL STRIP: NEGATIVE
NITRITE UR QL STRIP: NEGATIVE
NON-SQ EPI CELLS URNS QL MICRO: ABNORMAL /HPF
PH UR STRIP.AUTO: 6 [PH]
PH UR STRIP.AUTO: 6 [PH]
PROT UR STRIP-MCNC: ABNORMAL MG/DL
PROT UR STRIP-MCNC: ABNORMAL MG/DL
RBC #/AREA URNS AUTO: ABNORMAL /HPF
SP GR UR STRIP.AUTO: 1.01 (ref 1–1.03)
SP GR UR STRIP.AUTO: 1.01 (ref 1–1.03)
UROBILINOGEN UR STRIP-ACNC: <2 MG/DL
UROBILINOGEN UR STRIP-ACNC: <2 MG/DL
WBC #/AREA URNS AUTO: ABNORMAL /HPF

## 2023-06-12 PROCEDURE — 82785 ASSAY OF IGE: CPT

## 2023-06-12 PROCEDURE — 86364 TISS TRNSGLTMNASE EA IG CLAS: CPT

## 2023-06-12 PROCEDURE — 86003 ALLG SPEC IGE CRUDE XTRC EA: CPT

## 2023-06-12 PROCEDURE — 86258 DGP ANTIBODY EACH IG CLASS: CPT

## 2023-06-12 PROCEDURE — 36415 COLL VENOUS BLD VENIPUNCTURE: CPT

## 2023-06-12 PROCEDURE — 81001 URINALYSIS AUTO W/SCOPE: CPT

## 2023-06-12 PROCEDURE — 82784 ASSAY IGA/IGD/IGG/IGM EACH: CPT

## 2023-06-12 PROCEDURE — 87086 URINE CULTURE/COLONY COUNT: CPT

## 2023-06-12 PROCEDURE — 87338 HPYLORI STOOL AG IA: CPT

## 2023-06-12 PROCEDURE — 86231 EMA EACH IG CLASS: CPT

## 2023-06-12 PROCEDURE — 86008 ALLG SPEC IGE RECOMB EA: CPT

## 2023-06-13 ENCOUNTER — OFFICE VISIT (OUTPATIENT)
Dept: FAMILY MEDICINE CLINIC | Facility: CLINIC | Age: 28
End: 2023-06-13
Payer: COMMERCIAL

## 2023-06-13 VITALS
OXYGEN SATURATION: 99 % | RESPIRATION RATE: 16 BRPM | SYSTOLIC BLOOD PRESSURE: 120 MMHG | HEIGHT: 61 IN | BODY MASS INDEX: 36.63 KG/M2 | WEIGHT: 194 LBS | DIASTOLIC BLOOD PRESSURE: 70 MMHG | HEART RATE: 74 BPM

## 2023-06-13 DIAGNOSIS — M54.2 NECK PAIN: ICD-10-CM
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
## 2023-06-13 DIAGNOSIS — Z91.018 FOOD ALLERGY: ICD-10-CM

## 2023-06-13 DIAGNOSIS — S06.0X0D CONCUSSION WITHOUT LOSS OF CONSCIOUSNESS, SUBSEQUENT ENCOUNTER: ICD-10-CM

## 2023-06-13 PROBLEM — S06.0X0A CONCUSSION WITH NO LOSS OF CONSCIOUSNESS: Status: ACTIVE | Noted: 2023-06-13

## 2023-06-13 LAB
A-LACTALB IGE QN: <0.1 KAU/I
ALMOND IGE QN: <0.1 KUA/I
B-LACTOGLOB IGE QN: 0.13 KAU/I
BACTERIA UR CULT: NORMAL
CASEIN IGE QN: <0.1 KAU/I
CASHEW NUT IGE QN: <0.1 KUA/I
CODFISH IGE QN: <0.1 KUA/I
EGG WHITE IGE QN: 0.11 KUA/I
ENDOMYSIUM IGA SER QL: NEGATIVE
GLIADIN PEPTIDE IGA SER-ACNC: 10 UNITS (ref 0–19)
GLIADIN PEPTIDE IGG SER-ACNC: 15 UNITS (ref 0–19)
GLUTEN IGE QN: 0.58 KUA/I
H PYLORI AG STL QL IA: POSITIVE
HAZELNUT IGE QN: <0.1 KUA/L
IGA SERPL-MCNC: 163 MG/DL (ref 87–352)
MILK IGE QN: 0.2 KUA/I
OVALB IGE QN: <0.1 KAU/I
OVOMUCOID IGE QN: <0.1 KAU/I
PEANUT IGE QN: <0.1 KUA/I
SALMON IGE QN: <0.1 KUA/I
SCALLOP IGE QN: <0.1 KUA/L
SESAME SEED IGE QN: <0.1 KUA/I
SHRIMP IGE QN: <0.1 KUA/L
SOYBEAN IGE QN: <0.1 KUA/I
TOTAL IGE SMQN RAST: 129 KU/L (ref 0–113)
TTG IGA SER-ACNC: <2 U/ML (ref 0–3)
TTG IGG SER-ACNC: 6 U/ML (ref 0–5)
TUNA IGE QN: <0.1 KUA/I
WALNUT IGE QN: <0.1 KUA/I
WHEAT IGE QN: 0.38 KUA/I

## 2023-06-13 PROCEDURE — 99214 OFFICE O/P EST MOD 30 MIN: CPT | Performed by: FAMILY MEDICINE

## 2023-06-13 RX ORDER — CYCLOBENZAPRINE HCL 10 MG
10 TABLET ORAL 3 TIMES DAILY PRN
Qty: 20 TABLET | Refills: 0 | Status: SHIPPED | OUTPATIENT
Start: 2023-06-13

## 2023-06-13 RX ORDER — CLOBETASOL PROPIONATE 0.5 MG/G
OINTMENT TOPICAL
COMMUNITY
Start: 2023-06-06
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
## 2023-06-13 NOTE — ASSESSMENT & PLAN NOTE
Her labs show she has some allergy to wheat milk and egg white, she also has H  pylori she is following with gastroenterologist
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
## 2023-06-29 ENCOUNTER — OFFICE VISIT (OUTPATIENT)
Dept: GASTROENTEROLOGY | Facility: CLINIC | Age: 28
End: 2023-06-29
Payer: COMMERCIAL

## 2023-06-29 VITALS
HEART RATE: 83 BPM | BODY MASS INDEX: 36.82 KG/M2 | HEIGHT: 61 IN | SYSTOLIC BLOOD PRESSURE: 120 MMHG | DIASTOLIC BLOOD PRESSURE: 80 MMHG | RESPIRATION RATE: 16 BRPM | WEIGHT: 195 LBS | OXYGEN SATURATION: 98 %

## 2023-06-29 DIAGNOSIS — A04.8 H. PYLORI INFECTION: Primary | ICD-10-CM

## 2023-06-29 PROCEDURE — 99214 OFFICE O/P EST MOD 30 MIN: CPT | Performed by: PHYSICIAN ASSISTANT

## 2023-06-29 RX ORDER — AMOXICILLIN 250 MG/1
750 CAPSULE ORAL EVERY 8 HOURS SCHEDULED
Qty: 126 CAPSULE | Refills: 0 | Status: SHIPPED | OUTPATIENT
Start: 2023-06-29 | End: 2023-07-13

## 2023-06-29 RX ORDER — RIFABUTIN 150 MG/1
300 CAPSULE ORAL DAILY
Qty: 28 CAPSULE | Refills: 0 | Status: SHIPPED | OUTPATIENT
Start: 2023-06-29 | End: 2023-07-13

## 2023-06-29 RX ORDER — RUXOLITINIB 15 MG/G
CREAM TOPICAL
COMMUNITY
Start: 2023-06-14

## 2023-06-29 RX ORDER — PANTOPRAZOLE SODIUM 40 MG/1
40 TABLET, DELAYED RELEASE ORAL 2 TIMES DAILY
Qty: 28 TABLET | Refills: 0 | Status: SHIPPED | OUTPATIENT
Start: 2023-06-29 | End: 2023-07-13

## 2023-06-29 NOTE — PROGRESS NOTES
[facility] - Outpatient Follow-up Note  Marsha Stephenson 28 y o  female MRN: [id number]  Encounter: [id number]          ASSESSMENT AND PLAN:      1  H  pylori infection    Patient has a long history of h pylori  Patient had a recent stool h pylori that came back positive  She has previously been treated with Clarithromycin based triple therapy which was not successful, then Quadruple therapy which was not successful, and most recently high dose dual Amoxicillin therapy  She was given Rifabutin therapy in the past but reports she did not take it  She has been inconsistent with regimens in the past due to side effects but reports compliance with her last regimen  Last EGD in September of 2022 showed minimal duodenal bulb erythema  Biopsies showed h pylori and were negative for intestinal metaplasia  Will plan to treat with Rifabutin triple therapy: Rifabutin 300mg po daily, Amoxicillin 750mg po TID, and Pantoprazole 40mg po BID x 14 days  Stool h pylori in 2 months for VIRGIE     ______________________________________________________________________    SUBJECTIVE:  Patient is a pleasant 28year old female who presents to the office for follow up  She has a history of h pylori and has been on multiple regimens in the past   She had a recent stool h pylori which came back positive  She reports episodes of LUQ discomfort and nausea  No melena or bloody stools  REVIEW OF SYSTEMS IS OTHERWISE NEGATIVE        Historical Information   Past Medical History:   Diagnosis Date   • Anemia 55/2/[birth year]39   • Anemia complicating pregnancy 15/50/7809   • Anxiety    • Asthma    • Cerebral concussion 03/17/2017   • Depression    • GERD (gastroesophageal reflux disease)    • Group B Streptococcus carrier, antepartum 12/01/2017   • Head injury    • Herpes    • Migraine    • Postpartum depression    • Secondary amenorrhea 02/24/2020   • Varicella     vaccine     Past Surgical History:   Procedure "Laterality Date   • APPENDECTOMY     •  SECTION     • COSMETIC SURGERY      liposuction and buttocks   • EGD     • HEMORROIDECTOMY  2021   • INDUCED    2020    X 2   • [place]  DELIVERY ONLY N/A 2017    Procedure:  SECTION (); Surgeon: Pamela Knight DO;  Location: [facility];  Service: Obstetrics     Social History   Social History     Substance and Sexual Activity   Alcohol Use Yes    Comment: socially      Social History     Substance and Sexual Activity   Drug Use No     Social History     Tobacco Use   Smoking Status Former   • Types: Cigarettes   • Quit date: 2014   • Years since quittin 4   Smokeless Tobacco Never     Family History   Problem Relation Age of Onset   • Asthma Mother    • Depression Mother    • Migraines Mother    • Diabetes type II Mother    • Other Mother         vertigo   • Diabetes Maternal Aunt    • Hypertension Maternal Aunt    • Diabetes Maternal Grandmother    • Hypertension Maternal Grandmother    • Breast cancer Maternal Grandmother    • Migraines Father    • Heart murmur Brother    • Lung cancer Paternal Grandmother    • Stroke Paternal Grandmother        Meds/Allergies       Current Outpatient Medications:   •  amoxicillin (AMOXIL) 250 mg capsule  •  pantoprazole (PROTONIX) 40 mg tablet  •  rifabutin (MYCOBUTIN) 150 mg capsule  •  sertraline (ZOLOFT) 25 mg tablet  •  ALPRAZolam (XANAX) 0 25 mg tablet  •  clobetasol (TEMOVATE) 0 05 % ointment  •  cyclobenzaprine (FLEXERIL) 10 mg tablet  •  ibuprofen (MOTRIN) 600 mg tablet  •  metroNIDAZOLE (METROGEL) 0 75 % gel  •  metroNIDAZOLE (METROGEL) 0 75 % vaginal gel  •  Opzelura 1 5 % CREA  •  saccharomyces boulardii (FLORASTOR) 250 mg capsule  •  SUMAtriptan (Imitrex) 100 mg tablet    Allergies   Allergen Reactions   • Flagyl [Metronidazole] Nausea Only   • Macrobid [Nitrofurantoin] Nausea Only           Objective     Blood pressure 120/80, pulse 83, resp   rate 16, height 5' 1\" (1 549 m), " weight 88 5 kg (195 lb), SpO2 98 %, unknown if currently breastfeeding  Body mass index is 36 84 kg/m²  PHYSICAL EXAM:      General Appearance:   Alert, cooperative, no distress   HEENT:   Normocephalic, atraumatic, anicteric      Neck:  Supple, symmetrical, trachea midline   Lungs:   Clear to auscultation bilaterally; no rales, rhonchi or wheezing; respirations unlabored    Heart[de-identified]   Regular rate and rhythm; no murmur, rub, or gallop  Abdomen:   Soft, non-tender, non-distended; normal bowel sounds; no masses, no organomegaly    Genitalia:   Deferred    Rectal:   Deferred    Extremities:  No cyanosis, clubbing or edema    Pulses:  2+ and symmetric    Skin:  No jaundice, rashes, or lesions    Lymph nodes:  No palpable cervical lymphadenopathy        Lab Results:   No visits with results within 1 Day(s) from this visit     Latest known visit with results is:   Appointment on 06/12/2023   Component Date Value   • Urine Culture 06/12/2023 >100,000 cfu/ml    • Fish Cod 06/12/2023 <0 10    • Egg White 06/12/2023 0 11 (H)    • Gluten 06/12/2023 0 58 (H)    • Milk, Cow's 06/12/2023 0 20 (H)    • Peanut 06/12/2023 <0 10    • [place] 06/12/2023 <0 10    • Scallop IgE 06/12/2023 <0 10    • Sesame Seed IgE 06/12/2023 <0 10    • Shrimp 06/12/2023 <0 10    • SOYBEAN 06/12/2023 <0 10    • Tuna IgE 06/12/2023 <0 10    • [address] 06/12/2023 <0 10    • Wheat 06/12/2023 0 38 (H)    • Almonds 06/12/2023 <0 10    • Cashew 06/12/2023 <0 10    • [place] 06/12/2023 <0 10    • IgE 06/12/2023 129 (H)    • IgA 06/12/2023 163    • Gliadin IgA 06/12/2023 10    • Gliadin IgG 06/12/2023 15    • Tissue Transglut Ab IGG 06/12/2023 6 (H)    • TISSUE TRANSGLUTAMINASE * 06/12/2023 <2    • Endomysial IgA 06/12/2023 Negative    • Color, UA 06/12/2023 Light Yellow    • Clarity, UA 06/12/2023 Hazy    • Specific Gravity, UA 06/12/2023 1 015    • pH, UA 06/12/2023 6 0    • Leukocytes, UA 06/12/2023 Trace (A)    • Nitrite, UA 06/12/2023 Negative    • Protein, UA 06/12/2023 Trace (A)    • Glucose, UA 06/12/2023 Negative    • Ketones, UA 06/12/2023 Negative    • Urobilinogen, UA 06/12/2023 <2 0    • Bilirubin, UA 06/12/2023 Negative    • Occult Blood, UA 06/12/2023 Negative    • Color, UA 06/12/2023 Light Yellow    • Clarity, UA 06/12/2023 Hazy    • Specific Gravity, UA 06/12/2023 1 015    • pH, UA 06/12/2023 6 0    • Leukocytes, UA 06/12/2023 Trace (A)    • Nitrite, UA 06/12/2023 Negative    • Protein, UA 06/12/2023 Trace (A)    • Glucose, UA 06/12/2023 Negative    • Ketones, UA 06/12/2023 Negative    • Urobilinogen, UA 06/12/2023 <2 0    • Bilirubin, UA 06/12/2023 Negative    • Occult Blood, UA 06/12/2023 Negative    • H pylori Ag, Stl 06/12/2023 Positive (A)    • RBC, UA 06/12/2023 None Seen    • WBC, UA 06/12/2023 None Seen    • Epithelial Cells 06/12/2023 Occasional    • Bacteria, UA 06/12/2023 None Seen    • MUCUS THREADS 06/12/2023 Occasional (A)    • Amorphous Crystals, UA 06/12/2023 Moderate    • Alpha Lactalbumin 06/12/2023 <0 10    • Beta Lactoglobulin 06/12/2023 0 13 (H)    • Casein 06/12/2023 <0 10    • F232 Ovalbumin 06/12/2023 <0 10    • F233 Ovomucoid 06/12/2023 <0 10          Radiology Results:   No results found
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
## 2023-09-26 ENCOUNTER — HOSPITAL ENCOUNTER (EMERGENCY)
Facility: HOSPITAL | Age: 28
Discharge: HOME | End: 2023-09-26
Attending: EMERGENCY MEDICINE
Payer: COMMERCIAL

## 2023-09-26 VITALS
BODY MASS INDEX: 37.79 KG/M2 | RESPIRATION RATE: 18 BRPM | DIASTOLIC BLOOD PRESSURE: 65 MMHG | OXYGEN SATURATION: 99 % | TEMPERATURE: 97.1 F | WEIGHT: 200 LBS | SYSTOLIC BLOOD PRESSURE: 145 MMHG | HEART RATE: 97 BPM

## 2023-09-26 DIAGNOSIS — O21.9 NAUSEA AND VOMITING IN PREGNANCY: Primary | ICD-10-CM

## 2023-09-26 PROCEDURE — 99284 EMERGENCY DEPT VISIT MOD MDM: CPT | Performed by: EMERGENCY MEDICINE

## 2023-09-26 PROCEDURE — 99284 EMERGENCY DEPT VISIT MOD MDM: CPT

## 2023-09-26 RX ORDER — ONDANSETRON 4 MG/1
4 TABLET, ORALLY DISINTEGRATING ORAL EVERY 6 HOURS PRN
Qty: 20 TABLET | Refills: 0 | Status: SHIPPED | OUTPATIENT
Start: 2023-09-26

## 2023-09-26 RX ORDER — FAMOTIDINE 20 MG/1
20 TABLET, FILM COATED ORAL 2 TIMES DAILY
Qty: 30 TABLET | Refills: 0 | Status: SHIPPED | OUTPATIENT
Start: 2023-09-26

## 2023-09-26 RX ORDER — METOCLOPRAMIDE 10 MG/1
10 TABLET ORAL EVERY 6 HOURS
Qty: 30 TABLET | Refills: 0 | Status: SHIPPED | OUTPATIENT
Start: 2023-09-26

## 2023-09-26 RX ORDER — ONDANSETRON 4 MG/1
4 TABLET, ORALLY DISINTEGRATING ORAL ONCE
Status: COMPLETED | OUTPATIENT
Start: 2023-09-26 | End: 2023-09-26

## 2023-09-26 RX ORDER — FAMOTIDINE 20 MG/1
20 TABLET, FILM COATED ORAL ONCE
Status: COMPLETED | OUTPATIENT
Start: 2023-09-26 | End: 2023-09-26

## 2023-09-26 RX ADMIN — FAMOTIDINE 20 MG: 20 TABLET ORAL at 17:51

## 2023-09-26 RX ADMIN — ONDANSETRON 4 MG: 4 TABLET, ORALLY DISINTEGRATING ORAL at 17:51

## 2023-09-26 NOTE — Clinical Note
Nicole Avelar was seen and treated in our emergency department on 9/26/2023. Diagnosis:     Vannesa Rater  may return to work on return date. She may return on this date: 09/28/2023         If you have any questions or concerns, please don't hesitate to call.       Fatemeh Chavarria, MD    ______________________________           _______________          _______________  Hospital Representative                              Date                                Time

## 2023-09-26 NOTE — ED PROVIDER NOTES
History  Chief Complaint   Patient presents with   • Vomiting During Pregnancy     Pt reports she is approx 7 week pregnant. Has been exp N/V/D.     32 yo female approximately 7 weeks gestation who presents to ED c/o n/v/d. Occurs in context of prior h pylori and recently ran out of pantoprazole. No fever. No abdominal pain. No syncope or near syncope. No back pain. Tolerating PO since arrival to ED. Has scheduled OB visit next week. Prior to Admission Medications   Prescriptions Last Dose Informant Patient Reported? Taking? ALPRAZolam (XANAX) 0.25 mg tablet   No No   Sig: Take 1 tablet (0.25 mg total) by mouth 2 (two) times a day as needed for anxiety for up to 10 days   Opzelura 1.5 % CREA  Self Yes No   Patient not taking: Reported on 6/29/2023   SUMAtriptan (Imitrex) 100 mg tablet  Self No No   Sig: Take 1 tablet (100 mg total) by mouth once as needed for migraine for up to 1 dose   Patient not taking: Reported on 6/13/2023   clobetasol (TEMOVATE) 0.05 % ointment  Self Yes No   Patient not taking: Reported on 6/29/2023   cyclobenzaprine (FLEXERIL) 10 mg tablet  Self No No   Sig: Take 1 tablet (10 mg total) by mouth 3 (three) times a day as needed for muscle spasms   Patient not taking: Reported on 6/29/2023   ibuprofen (MOTRIN) 600 mg tablet  Self Yes No   Patient not taking: Reported on 6/13/2023   metroNIDAZOLE (METROGEL) 0.75 % gel   No No   Sig: Sig 1 applicator hs x 5 days   Patient not taking: Reported on 6/13/2023   metroNIDAZOLE (METROGEL) 0.75 % vaginal gel  Self No No   Sig: Insert 1 application nightly for 5 days.    Patient not taking: Reported on 6/13/2023   pantoprazole (PROTONIX) 40 mg tablet   No No   Sig: Take 1 tablet (40 mg total) by mouth 2 (two) times a day for 14 days   rifabutin (MYCOBUTIN) 150 mg capsule   No No   Sig: Take 2 capsules (300 mg total) by mouth daily for 14 days   saccharomyces boulardii (FLORASTOR) 250 mg capsule  Self Yes No   Sig: Take 250 mg by mouth 2 (two) times a day   Patient not taking: Reported on 2023   sertraline (ZOLOFT) 25 mg tablet  Self Yes No   Sig: Take 50 mg by mouth daily      Facility-Administered Medications: None       Past Medical History:   Diagnosis Date   • Anemia    • Anemia complicating pregnancy    • Anxiety    • Asthma    • Cerebral concussion 2017   • Depression    • GERD (gastroesophageal reflux disease)    • Group B Streptococcus carrier, antepartum 2017   • Head injury    • Herpes    • Migraine    • Postpartum depression    • Secondary amenorrhea 2020   • Varicella     vaccine       Past Surgical History:   Procedure Laterality Date   • APPENDECTOMY     •  SECTION     • COSMETIC SURGERY      liposuction and buttocks   • EGD     • HEMORROIDECTOMY  2021   • INDUCED   2012 2020    X 2   • [place]  DELIVERY ONLY N/A 2017    Procedure:  SECTION (); Surgeon: Fiordaliza Ulloa DO;  Location: [facility];  Service: Obstetrics       Family History   Problem Relation Age of Onset   • Asthma Mother    • Depression Mother    • Migraines Mother    • Diabetes type II Mother    • Other Mother         vertigo   • Diabetes Maternal Aunt    • Hypertension Maternal Aunt    • Diabetes Maternal Grandmother    • Hypertension Maternal Grandmother    • Breast cancer Maternal Grandmother    • Migraines Father    • Heart murmur Brother    • Lung cancer Paternal Grandmother    • Stroke Paternal Grandmother      I have reviewed and agree with the history as documented.     E-Cigarette/Vaping   • E-Cigarette Use Current Some Day User    • Comments Hooka      E-Cigarette/Vaping Substances   • Nicotine No    • THC No    • CBD No    • Flavoring Yes    • Other No    • Unknown No      Social History     Tobacco Use   • Smoking status: Former     Types: Cigarettes     Quit date: 2014     Years since quittin.7   • Smokeless tobacco: Never   Vaping Use   • Vaping Use: Some days   • Substances: Flavoring   Substance Use Topics   • Alcohol use: Yes     Comment: socially    • Drug use: No       Review of Systems   Gastrointestinal: Positive for diarrhea, nausea and vomiting. All other systems reviewed and are negative. Physical Exam  Physical Exam  Vitals and nursing note reviewed. Constitutional:       General: She is not in acute distress. Appearance: Normal appearance. She is well-developed. She is not ill-appearing, toxic-appearing or diaphoretic. HENT:      Head: Normocephalic and atraumatic. Eyes:      Conjunctiva/sclera: Conjunctivae normal.      Pupils: Pupils are equal, round, and reactive to light. Neck:      Vascular: No JVD. Cardiovascular:      Rate and Rhythm: Normal rate and regular rhythm. Pulses: Normal pulses. Heart sounds: Normal heart sounds. Pulmonary:      Effort: Pulmonary effort is normal.      Breath sounds: Normal breath sounds. No stridor. Abdominal:      General: There is no distension. Palpations: Abdomen is soft. Tenderness: There is no abdominal tenderness. There is no guarding or rebound. Musculoskeletal:         General: No tenderness or deformity. Normal range of motion. Cervical back: Normal range of motion and neck supple. Skin:     General: Skin is warm and dry. Capillary Refill: Capillary refill takes less than 2 seconds. Coloration: Skin is not pale. Findings: No erythema or rash. Neurological:      General: No focal deficit present. Mental Status: She is alert and oriented to person, place, and time. Cranial Nerves: No cranial nerve deficit. Sensory: No sensory deficit. Motor: No weakness or abnormal muscle tone.       Coordination: Coordination normal.      Gait: Gait normal.         Vital Signs  ED Triage Vitals [09/26/23 1319]   Temperature Pulse Respirations Blood Pressure SpO2   (!) 97.1 °F (36.2 °C) 97 18 145/65 99 %      Temp src Heart Rate Source Patient Position - Orthostatic VS BP Location FiO2 (%)   -- -- -- -- --      Pain Score       --           Vitals:    09/26/23 1319   BP: 145/65   Pulse: 97         Visual Acuity      ED Medications  Medications   ondansetron (ZOFRAN-ODT) dispersible tablet 4 mg (4 mg Oral Given 9/26/23 1751)   famotidine (PEPCID) tablet 20 mg (20 mg Oral Given 9/26/23 1751)       Diagnostic Studies  Results Reviewed     None                 No orders to display              Procedures  Procedures         ED Course                                             Medical Decision Making  Nausea and vomiting in pregnancy: complicated acute illness or injury  Risk  Prescription drug management. Disposition  Final diagnoses:   Nausea and vomiting in pregnancy     Time reflects when diagnosis was documented in both MDM as applicable and the Disposition within this note     Time User Action Codes Description Comment    9/26/2023  5:37 PM Chandrakant Alexander Add [O21.9] Nausea and vomiting in pregnancy       ED Disposition     ED Disposition   Discharge    Condition   Stable    Date/Time   Tue Sep 26, 2023  5:37 PM    Comment   Sylvia Barton Minglrichard discharge to home/self care.                Follow-up Information     Follow up With Specialties Details Why Contact Info Additional Information    [address] [place] [address] Emergency Department Emergency Medicine  If symptoms worsen [id number] [facility] [id number] [address] [facility] Emergency Department, [place], [place], [address]          Discharge Medication List as of 9/26/2023  5:39 PM      START taking these medications    Details   famotidine (PEPCID) 20 mg tablet Take 1 tablet (20 mg total) by mouth 2 (two) times a day, Starting Tue 9/26/2023, Print      metoclopramide (Reglan) 10 mg tablet Take 1 tablet (10 mg total) by mouth every 6 (six) hours Take this if vomiting unalleviated by zofran, Starting Tue 9/26/2023, Print ondansetron (Zofran ODT) 4 mg disintegrating tablet Take 1 tablet (4 mg total) by mouth every 6 (six) hours as needed for nausea or vomiting, Starting Tue 9/26/2023, Print         CONTINUE these medications which have NOT CHANGED    Details   ALPRAZolam (XANAX) 0.25 mg tablet Take 1 tablet (0.25 mg total) by mouth 2 (two) times a day as needed for anxiety for up to 10 days, Starting Wed 2/8/2023, Until Sat 2/18/2023 at 2359, Normal      clobetasol (TEMOVATE) 0.05 % ointment Starting Tue 6/6/2023, Historical Med      cyclobenzaprine (FLEXERIL) 10 mg tablet Take 1 tablet (10 mg total) by mouth 3 (three) times a day as needed for muscle spasms, Starting Tue 6/13/2023, Normal      ibuprofen (MOTRIN) 600 mg tablet Starting Sun 5/14/2023, Historical Med      metroNIDAZOLE (METROGEL) 8.45 % gel Sig 1 applicator hs x 5 days, Normal      metroNIDAZOLE (METROGEL) 0.75 % vaginal gel Insert 1 application nightly for 5 days. , Normal      Opzelura 1.5 % CREA Starting Wed 6/14/2023, Historical Med      pantoprazole (PROTONIX) 40 mg tablet Take 1 tablet (40 mg total) by mouth 2 (two) times a day for 14 days, Starting Thu 6/29/2023, Until Thu 7/13/2023, Normal      rifabutin (MYCOBUTIN) 150 mg capsule Take 2 capsules (300 mg total) by mouth daily for 14 days, Starting Thu 6/29/2023, Until Thu 7/13/2023, Normal      saccharomyces boulardii (FLORASTOR) 250 mg capsule Take 250 mg by mouth 2 (two) times a day, Historical Med      sertraline (ZOLOFT) 25 mg tablet Take 50 mg by mouth daily, Historical Med      SUMAtriptan (Imitrex) 100 mg tablet Take 1 tablet (100 mg total) by mouth once as needed for migraine for up to 1 dose, Starting Tue 9/13/2022, Normal             No discharge procedures on file.     PDMP Review       Value Time User    PDMP Reviewed  Yes 2/8/2023 10:13 AM Ev Arredondo MD          ED Provider  Electronically Signed by           Craig Israel MD  09/26/23 [id number]

## 2023-09-26 NOTE — Clinical Note
Sruthi Rodriguez was seen and treated in our emergency department on 9/26/2023. Diagnosis:     Franky Kirk  may return to work on return date. She may return on this date: 09/28/2023         If you have any questions or concerns, please don't hesitate to call.       Curtis Helton MD    ______________________________           _______________          _______________  Hospital Representative                              Date                                Time No

## 2023-10-05 ENCOUNTER — ULTRASOUND (OUTPATIENT)
Dept: OBGYN CLINIC | Facility: MEDICAL CENTER | Age: 28
End: 2023-10-05
Payer: COMMERCIAL

## 2023-10-05 VITALS
BODY MASS INDEX: 37.19 KG/M2 | HEIGHT: 61 IN | DIASTOLIC BLOOD PRESSURE: 64 MMHG | SYSTOLIC BLOOD PRESSURE: 106 MMHG | WEIGHT: 197 LBS

## 2023-10-05 DIAGNOSIS — N91.2 AMENORRHEA: Primary | ICD-10-CM

## 2023-10-05 DIAGNOSIS — Z36.89 ESTABLISH GESTATIONAL AGE, ULTRASOUND: ICD-10-CM

## 2023-10-05 DIAGNOSIS — O21.9 NAUSEA/VOMITING IN PREGNANCY: ICD-10-CM

## 2023-10-05 DIAGNOSIS — Z3A.08 8 WEEKS GESTATION OF PREGNANCY: ICD-10-CM

## 2023-10-05 PROCEDURE — 76817 TRANSVAGINAL US OBSTETRIC: CPT | Performed by: CLINICAL NURSE SPECIALIST

## 2023-10-05 PROCEDURE — 99213 OFFICE O/P EST LOW 20 MIN: CPT | Performed by: CLINICAL NURSE SPECIALIST

## 2023-10-05 RX ORDER — DOXYLAMINE SUCCINATE AND PYRIDOXINE HYDROCHLORIDE, DELAYED RELEASE TABLETS 10 MG/10 MG 10; 10 MG/1; MG/1
1 TABLET, DELAYED RELEASE ORAL 2 TIMES DAILY PRN
Qty: 30 TABLET | Refills: 1 | Status: SHIPPED | OUTPATIENT
Start: 2023-10-05

## 2023-10-05 NOTE — ASSESSMENT & PLAN NOTE
She is a [id number] with Patient's last menstrual period was 08/04/2023 (exact date). Ultrasound today is showing a viable IUP that is c/w GA by LMP. NO change in [facility] - 5/10/24. Referral to [facility] [place] given for routine US.  F/U for OB intake and then Initial Prenatal Exam.

## 2023-10-05 NOTE — ASSESSMENT & PLAN NOTE
Patient is reporting daily nausea/vomitting- sometimes multiple times per day.    Reviewed dietary strategies and will try diclegis (or OTC version if insurance doesn't cover/too expensive)  The following precautions were reviewed:  · Call in 2-3 days if vomitting doesn't improve   · weigh herself once per week and if > 2 pound weight loss call right away  Call w/ s/s of dehydration including decreased urination, cotton mouth, dizziness/lightheadedness, severe HA

## 2023-10-05 NOTE — PATIENT INSTRUCTIONS
Regarding Nausea and vomiting  Try to eat  A few crackers before getting out of bed in the morning  Try to eat small meals every 2-3 hours  I prescribed Diclegis to take 1-2 times per day   If its not covered by insurance or too expensive can try the over the counter version of Diclegis. It is vitamin b6 2 mg 3x's per day and Doxylamine 12.5mg (1/2 tablet of a 25 mg pill) 2x's per day. Please call in 2-3 days if vomitting doesn't improve  Please weigh yourself once per week and if > 2 pound weight loss call right away  Call if unable to keep anything down > 24 hours  Call if urination decreases, has "cotton mouth" or other dry mucous membranes or if Lightheaded or dizziness        Again, congratulations on your pregnancy! NEXT STEPS  Get Prenatal bloodwork  Call [facility] to schedule next ultrasound (done 12-13 wks)   Contact information for [facility] ([facility]A Maternal Fetal [facility])- Main Number [phone]   Return to our office in 1-2 weeks for an OB intake and initial prenatal Exam(or sooner if any problems/concerns arise- see packet for things to report)  Check out [employer] website to read the "Pregnancy Essentials Guide" -this has lots of great early pregnancy information     It can be found by going to sln. org-->select services-->select women's health-->select Obstetrics  http://solitario/    Below is a variety of information that is useful in early pregnancy   Genetic screening can be very confusing for most people   We do recommend genetic screening universally for all pregnant women. Our goal is to have the most healthy uncomplicated pregnancy possible and this is one way to identify possible complications early. Common disorders we can screen for include: Down Syndrome, Neural tube defects ( like spina bifida or gastroschisis) and trisomy 15, even possibly more  There are several options we will talk more about.  Below is some information to get you started thinking about this. We will discuss this more at the next appt. GUIDE TO GENETIC TESTING    Aneuploidy Testing  Trisomy 21 (Down Syndrome), Trisomy 18, and Open Neural Tube Defects (Spina Bifida). You may choose one of the following options: See below for CPT/Diagnostic codes  NIPT (Non-Invasive Prenatal Testing or Cell Free- Fetal DNA): This simple and accurate non-invasive prenatal screening blood test offers results for early risk assessment of Down syndrome (Trisomy 21), or Trisomy 25, trisomy 15 and other aneuploidy conditions. The test also offers an optional analysis for fetal sex. The test analyzes the relative amount of 21, 18, 13; X and Y chromosome material in circulating cell-free DNA from a maternal blood sample. This test can be performed at any time after 10 weeks gestation. If you elect this test, you will also have an AFP (alpha-fetoprotein) blood test to test for open neural tube defects. Recommended follow up to a positive result is genetic counseling and prenatal diagnosis. Sequential Screening with Nuchal Translucency: This is a two-step test to detect whether a fetus is at increased risk for trisomy 24, trisomy 25, trisomy 15 and open neural tube defects. The test has a narrow window for testing (the first step must be performed between 10 and 13 weeks gestation). It includes two blood draws and an ultrasound. The ultrasound measures the amount of fluid behind the baby’s neck called the nuchal translucency (NT). The blood tests measures three different maternal hormone levels, -- pregnancy associated plasma protein (DONOVAN-A), human chorionic gonadotrophin (hCG), and dimeric inhibin A (BO). These measurements in combination with some maternal information such as height and weight are used to calculate whether the baby is at increased risk for Down Syndrome or Trisomy 18. An alpha-fetoprotein test (AFP) is also included to test for open neural tube defects. Recommended follow up to a positive result is additional testing that is more definitive, and referral for genetic counseling and prenatal diagnosis. Quad Screen: This test is also known as the quadruple marker test.  It is a test that measures levels of four substances in a pregnant woman’s blood--Alpha-fetoprotein (AFP), a protein made by the developing baby; Human chorionic gonadotropin (hCG), a hormone made by the placenta; Estriol, a hormone made by the placenta and the baby’s liver; and Inhibin A, another hormone made by the placenta. Typically, the quad screen is done between weeks 15 and 20 of pregnancy--the second trimester and the results indicate whether the baby is at higher risk for Down Syndrome (Trisomy 21), Trisomy 18, and open neural tube defects. This is a screening test.  Recommended follow up to a positive result is additional testing that is more definitive, as well as referral for genetic counseling and prenatal diagnosis. Trisomy 21 Trisomy 18 Trisomy 13   NIPT  (FPR 0.1%) <99% <98% 99%   Sequential Screening (FPR 3.5%) 92% 90% N/A   Quad Screen   (FPR 5%) 83% 80% N/A   (FPR is False Positive Rate)       Additional Screening Tests Offered  Cystic Fibrosis: Cystic Fibrosis is the most common inherited disease of children and young adults. The carrier frequency is 1 in 24, to 1 in 97. Both parents need to be carriers for a child to be affected (25% chance). One in 200, children born are affected. Cystic fibrosis is a disorder of mucus production and produces abnormally thick mucus leading to life threatening lung infections, digestion problems, poor growth, infertility, and more. Symptoms range from mild to severe, but individuals with severe disease may die in childhood. With treatments today, people with Cystic Fibrosis can live into their 30’s. CF does not affect intelligence. Recommended follow up to a positive result is testing of the baby’s father.   Spinal Muscular Atrophy (SMA): SMA is the most common inherited cause of early childhood death. The carrier frequency is 1 in 52 to 1 in 67 in the [place] and both parents need to be carriers for a child to be affected (25% chance). 1 in 11,000 children are affected. SMA is a progressive degeneration of lower motor neurons. Muscle weakness is the most common type with respiratory failure by the age of 3years old. Muscles responsible for crawling, walking, swallowing, and head and neck control are most severely affected. Recommended follow up to a positive result is testing of the baby’s father. Fragile X Syndrome (the most common inherited cause of developmental delays): Fragile X syndrome is an “X-linked” genetic disease, which means it is only carried by the mom. Unfortunately, 1 in 250 females are carriers and a child has a 50% chance of being affected if this is the case. 1 in 4000 boys is affected with Fragile X and 1 in 8000 girls is affected. Approximately 1/3 of all children born with Fragile X also have autism and hyperactivity. Recommended follow up to a positive result is genetic counseling and prenatal diagnosis. Guide To Insurance Coverage For Genetic Screening  Due to the complexity of coverage guidelines, we are unable to quote benefits or guarantee insurance coverage for any of these tests. Insurance benefits are plan-specific and offer vastly different coverage based on your policy. The handout attached explains the benefits to each test, and also provides the billing (CPT) codes for each test.  Even if the testing is covered, it could be applied to any unmet deductibles, and copays may apply, resulting in a bill. You are encouraged to contact your insurance company to obtain your benefits based on your age and risk factors, so that there are no surprises.       Test Insurance Procedure (CPT) code   NIPT-cell free fetal DNA Most accurate non-invasive test- not always covered w/o risk factors 86670 Sequential Screen w/ NT US Current standard test-should be covered Part 1: (if lab is [facility]) 44731,54929   (If lab is [address] ) [address]  Part 2: (if lab is [facility])11549,88464,77718, 72900  (If lab is [facility]) 04493   Quad screen Old standard-use if past 1st trimester 04620, 94752, 84611, 83147   CF- Cystic Fibrosis  52680   SMA- Spinal Musc. Atrophy  68447   Fragile X  [id number]       Diagnosis code used Varies based on history- But will be one of these:  Encounter for  screening for other genetic defect Z36.8  Advanced Maternal Age (over 28) O12.46  Family History of Genetic Disease Carrier Z84.81    Please contact your insurance company with the appropriate CPT code from the attached list, and diagnosis code applicable to your situation. We ask that you review this information and decide what testing you would like to have performed. Please note that the Sequential Screening with Nuchal Translucency has a smaller window of time to be performed during pregnancy (Prior to 14 wks). Warning Signs During Pregnancy  The list below includes warning signs your providers would like you to be aware of. If you experience any of these at any time during your pregnancy, please call us as soon as possible. Vaginal bleeding   Sharp abdominal pain that does not go away   Fever (more than 100. 4? F and is not relieved with Tylenol)   Persistent vomiting lasting greater than 24 hours   Chest pain   Pain or burning when you urinate   Severe headache that doesn’t resolve with Tylenol   Blurred vision or seeing spots in your vision   Sudden swelling of your face or hands   Redness, swelling or pain in a leg   A sudden weight gain in just a few days   Decrease in your baby’s movements (after 28 weeks or the 6th month of pregnancy)   A loss of watery fluid from your vagina - can be a gush, a trickle or  continuous wetness   After 20 weeks of pregnancy, rhythmic cramping (greater than 4 per hour)  or menstrual like low/pelvic pain    Call the OFFICE [phone] for any questions/emergencies. At night or on the weekend, please indicate it is an emergency and the DOCTOR on call will be paged. Discomforts of Early Pregnancy    Tips for coping with nausea and vomiting during pregnancy   Eat meals and snacks slowly   Eat every 1-2 hours to avoid a full stomach   Don’t skip meals, avoid empty stomach   Eat a snack prior to getting out of bed   Avoid food and beverages with a strong aroma   Avoid dehydration - drink enough fluid to keep the urine pale yellow   Drink fluids before a meal to minimize the effect of a full stomach   Limit the amount of coffee and beverages that contain caffeine   Eliminate spicy, odorous, high fat (fried foods), acidic (tomato products) and sweet foods   Fluids that contain lemon (lemonade), mint (tea) or orange can usually be well tolerated   Snacks and meals that contain low-fat protein (lean meats, fish, poultry and eggs) along with eating easily digestible carbs (fruit, rice, toast, crackers and dry cereal) may be tolerated better   Foods with ginger may be well tolerated. May use ginger root powder, capsules or extract (up to 1000 mg per day)   Drink liquids in small amounts    If symptoms persist, please contact your provider. Tips for coping with constipation during pregnancy   Increase fiber and fluids.  - Drink 8-10 cups of liquid, like water or low-sugar juice daily  - Keep urine pale with fluids (water, milk), fruit and vegetables   Eat a well-balanced diet that contains high fiber food (fruits, vegetables, whole grain breads and cereals, bran and dried beans)   Take a 30-minute walk daily   You may take a mild stool softener such as Colace®    If symptoms persist, please contact your provider. For any emergencies, PLEASE CALL THE OFFICE at [phone]. If the office is closed, the doctor on call will be paged by the answering service.     Medications and Pregnancy- see Pregnancy Essentials guide online- page 9    Expected Weight Gain During Pregnancy  If you have a healthy BMI (18-25) prior to pregnancy:  The recommended weight gain is between 25-35 pounds. Approximate weight gain  in the first trimester is 1-4.5 pounds. An expected weight gain during the second and  third trimester is approximately one pound per week. If you have a BMI of less than 18 prior to pregnancy,  you are considered underweight:  The recommended weight gain is between 28-40 pounds. Approximate weight gain  in the first trimester is 1-4.5 pounds. An expected weight gain during the second and  third trimester is just over one pound per week. If your BMI is 25 to 29.9: you are considered overweight:  You should gain 1/2 to 2/3 pound during the second and third trimester, for a total  weight gain of 15 to 25 pounds. If you have a BMI of greater than 30 prior to pregnancy,  you are considered overweight:  The recommended weight gain is between 15-25 pounds. Approximate weight gain  in the first trimester is 1-4.5 pounds. An expected weight gain during the second  and third trimester is approximately 0.5 pound per week. Foods to avoid during pregnancy:   Unpasteurized milk, juice and cheese  - Soft cheeses like feta or brie (if made with UNPASTEURIZED milk)   Unheated deli meats like lunchmeat and hotdogs   Undercooked poultry, beef, pork, seafood including raw sushi    What fish is safe to eat during pregnancy? Eat 8 to 12 ounces of fish a week. Pick from this group frequently, especially if you follow  the American Heart Association’s recommendation to eat fish at least 2 times a week. AVOID HIGH MERCURY FISH  A single meal of the following fish can put  you over the Environmental Protection  Agency’s safe limit for the month.   High mercury fish:  Shark  Swordfish  [employer]  Tile Fish    Caffeine and Pregnancy    The [employer] and [employer] of Obstetrics and Gynecologists (ACOG) urge pregnant  women to limit their caffeine consumption to no more than 200 milligrams (mg) per day. This is  comparable to having one 12-ounce cup of coffee a day. This level has been shown not to increase risk  of miscarriage, growth or  labor complications. Effects of higher levels are not known. Exercise During Pregnancy  A daily exercise program that consists of 30 minutes a day is recommended. Low impact exercises like walking and swimming are great exercises throughout  all of pregnancy   If you’re an avid strength  avoid lifting very heavy weights - nothing more  than 30 pounds    Drink plenty of fluids while exercising to stay hydrated. Be careful to avoid overheating. ACTIVITIES TO AVOID   Exercises that can make you lose your balance. Activities that can put your baby at risk i.e. horseback riding, scuba diving, skiing  or snowboarding. Any other sport that puts you at risk for getting hit in the  abdominal area. Do not use saunas, steam rooms or hot tubs (that have a higher temperature  than 100F)   After the first trimester, avoid exercises that require you to lay flat on your back. Avoid exceeding a heart rate greater than 140 beats per minute.  As long as you are  able to hold a conversation while exercising your heart rate is likely acceptable

## 2023-10-05 NOTE — PROGRESS NOTES
Subjective  Patient ID: Rosendo Galindo is a 33 y.o. female here for Pregnancy Ultrasound (lmp /rudolph 5/10/8w6d/She is feeling nauseas and vomiting and is getting head aches/Pregnancy was not planned but welcomed she is here with her partner Steven Peer are regular /)    Newly Pregnant  Patient's last menstrual period was 2023 (exact date). giving her an RUDOLPH of 5/10/24 and a gestational age of  11 weeks 6days (based on LMP)    Menstrual cycle: regular, cycle length:  25 days  Pregnancy was unplanned/surpised. She has started taking a prenatal vitamin    She is C/O amenorrhea  Signs and symptoms of pregnancy:   • Breast tenderness: no  • Fatigue: yes  • Cramping or Pelvic Pain: no  • Spotting or Vaginal Bleeding: no  • Nausea or vomiting: yes- daily after eating   • Headaches    OB History    Para Term  AB Living   8 2 2 0 4 2   SAB IAB Ectopic Multiple Live Births   1 3   0 2      # Outcome Date GA Lbr David/2nd Weight Sex Delivery Anes PTL Lv   8 Current            7 Term 17 39w6d  2930 g (6 lb 7.4 oz) M CS-LTranv EPI N JAMEY      Complications: Fetal Intolerance   6 Term 2015 39w0d  3175 g (7 lb) F    JAMEY   5 IAB            4 SAB            3             2 IAB            1 IAB               Obstetric Comments   Hx of , spontaneous complete    Normal delivery, 1 daughter        The following portions of the patient's history were reviewed and updated as appropriate: allergies, current medications, past family history, past medical history, past social history, past surgical history, and problem list.    Perinent hx that may affect pregnancy:  • Previous C/S-due to fetal distress. • Multiple terminations/miscarriages      Review of Systems    See HPI for pertinent positives.              /64 (BP Location: Right arm, Patient Position: Sitting, Cuff Size: Large)   Ht 5' 1" (1.549 m)   Wt 89.4 kg (197 lb)   LMP 2023 (Exact Date)   BMI 37.22 kg/m² OBGyn Exam      FIRST TRIMESTER OBSTETRIC ULTRASOUND  [id number]   Patient's last menstrual period was 08/04/2023 (exact date). INDICATION: Establish Gestational Age      FINDINGS:  See imaging report for details     Additional Findings: none     FHR: 152  IMPRESSION:    Single intrauterine pregnancy of 8 weeks 1days gestational age  Fetal cardiac activity detected. No adnexal masses seen. EDC by LMP: 5/10/24  EDC by this Ultrasound: 5/15/24    Assigning a Final RUDOLPH  Please choose how you are assigning the RUDOLPH: The gestational age by LMP is </= 8w 6d and demonstrates 5 or fewer days difference from the gestational age by [facility], therefore the final RUDOLPH will be based on the LMP    Final RUDOLPH: 5/10/24 by LMP. Melany Deleon, [address] [address]  Dept: [phone]  Dept Fax: [phone]  Loc: [phone]  Loc Fax: [phone]  Ultrasound Probe Disinfection    A transvaginal ultrasound was performed. Prior to use, disinfection was performed with High Level Disinfection Process (eyetok). Probe serial number F: [id number] was used. Assessment/Plan:         1. Amenorrhea  -     AMB US OB < 14 weeks single or first gestation level 1    2. Establish gestational age, ultrasound  -     AMB US OB < 14 weeks single or first gestation level 1    3. 8 weeks gestation of pregnancy  Assessment & Plan:  She is a [id number] with Patient's last menstrual period was 08/04/2023 (exact date). Ultrasound today is showing a viable IUP that is c/w GA by LMP. NO change in [facility] - 5/10/24. Referral to [facility] given for routine US. F/U for OB intake and then Initial Prenatal Exam.      Orders:  -     Ambulatory Referral to Maternal Fetal Medicine; Future; Expected date: 10/05/2023    4.  Nausea/vomiting in pregnancy  Assessment & Plan:    Patient is reporting daily nausea/vomitting- sometimes multiple times per day. Reviewed dietary strategies and will try diclegis (or OTC version if insurance doesn't cover/too expensive)  The following precautions were reviewed:  · Call in 2-3 days if vomitting doesn't improve   · weigh herself once per week and if > 2 pound weight loss call right away  Call w/ s/s of dehydration including decreased urination, cotton mouth, dizziness/lightheadedness, severe HA        Orders:  -     Doxylamine-Pyridoxine 10-10 MG TBEC;  Take 1 tablet by mouth 2 (two) times a day as needed (Vomitting)      Orders Placed This Encounter   Procedures   • Ambulatory Referral to Maternal Fetal Medicine   • AMB US OB < 14 weeks single or first gestation level 1

## 2023-10-10 ENCOUNTER — TELEPHONE (OUTPATIENT)
Facility: HOSPITAL | Age: 28
End: 2023-10-10

## 2023-10-10 DIAGNOSIS — A04.8 H. PYLORI INFECTION: ICD-10-CM

## 2023-10-10 RX ORDER — RIFABUTIN 150 MG/1
300 CAPSULE ORAL DAILY
Qty: 28 CAPSULE | Refills: 0 | Status: SHIPPED | OUTPATIENT
Start: 2023-10-10 | End: 2023-10-24

## 2023-10-10 RX ORDER — PANTOPRAZOLE SODIUM 40 MG/1
40 TABLET, DELAYED RELEASE ORAL 2 TIMES DAILY
Qty: 28 TABLET | Refills: 0 | Status: SHIPPED | OUTPATIENT
Start: 2023-10-10 | End: 2023-10-24

## 2023-10-10 NOTE — TELEPHONE ENCOUNTER
Pt contacted to confirm apt cancellation on MyChart. Pt was asked if she would like to reschedule as all of her appointments were canceled. Pt stated she would not like to reschedule.

## 2023-10-10 NOTE — TELEPHONE ENCOUNTER
Reason for call:   [x] Refill   [] Prior Auth  [x] Other: Patient called to have these sent to a different pharmacy. She never picked up these medications from [facility] and they are now closed. She also requested Amoxicillin 250 mg that is not on her active med list.   Office:   [] PCP/Provider -   [x] Speciality/Provider - gi/Cecilia    Medication: Rifabutin 150mg                     [id number] Amoxil 250mg            [facility]    Does the patient have enough for 3 days?    [] Yes   [] No - Send as HP to POD

## 2023-10-13 DIAGNOSIS — A04.8 H. PYLORI INFECTION: ICD-10-CM

## 2023-10-13 DIAGNOSIS — O21.9 NAUSEA/VOMITING IN PREGNANCY: ICD-10-CM

## 2023-10-13 RX ORDER — PANTOPRAZOLE SODIUM 40 MG/1
40 TABLET, DELAYED RELEASE ORAL 2 TIMES DAILY
Qty: 28 TABLET | Refills: 0 | Status: CANCELLED | OUTPATIENT
Start: 2023-10-13 | End: 2023-10-27

## 2023-10-13 RX ORDER — PANTOPRAZOLE SODIUM 40 MG/1
40 TABLET, DELAYED RELEASE ORAL 2 TIMES DAILY
Qty: 28 TABLET | Refills: 0 | OUTPATIENT
Start: 2023-10-13 | End: 2023-10-27

## 2023-10-13 RX ORDER — RIFABUTIN 150 MG/1
300 CAPSULE ORAL DAILY
Qty: 28 CAPSULE | Refills: 0 | OUTPATIENT
Start: 2023-10-13 | End: 2023-10-27

## 2023-10-13 RX ORDER — DOXYLAMINE SUCCINATE AND PYRIDOXINE HYDROCHLORIDE, DELAYED RELEASE TABLETS 10 MG/10 MG 10; 10 MG/1; MG/1
1 TABLET, DELAYED RELEASE ORAL 2 TIMES DAILY PRN
Qty: 30 TABLET | Refills: 1 | OUTPATIENT
Start: 2023-10-13

## 2023-10-13 RX ORDER — RIFABUTIN 150 MG/1
300 CAPSULE ORAL DAILY
Qty: 28 CAPSULE | Refills: 0 | Status: CANCELLED | OUTPATIENT
Start: 2023-10-13 | End: 2023-10-27

## 2023-10-13 RX ORDER — DOXYLAMINE SUCCINATE AND PYRIDOXINE HYDROCHLORIDE, DELAYED RELEASE TABLETS 10 MG/10 MG 10; 10 MG/1; MG/1
1 TABLET, DELAYED RELEASE ORAL 2 TIMES DAILY PRN
Qty: 30 TABLET | Refills: 1 | Status: CANCELLED | OUTPATIENT
Start: 2023-10-13

## 2023-10-16 ENCOUNTER — TELEPHONE (OUTPATIENT)
Age: 28
End: 2023-10-16

## 2023-10-16 NOTE — TELEPHONE ENCOUNTER
LMOM to confirm medication that was sent to the pharmacy and to call back office with any questions or concerns

## 2023-10-16 NOTE — TELEPHONE ENCOUNTER
Hi team! Looks like Dr. Darylene Pitman sent the regimen last week. Can you check with pharmacy to see if they were able to fill the scripts?

## 2023-10-16 NOTE — TELEPHONE ENCOUNTER
Patient called about medications for H Pylori infection she states there were three medications sent it for her, one starting with a C [do not see active med with C on chart] that needed a prior auth. Told patient I would have provider review and let her know so she can start medication treatment ASAP.

## 2023-11-21 NOTE — PROGRESS NOTES
Diagnoses and all orders for this visit:    BV (bacterial vaginosis)  -     metroNIDAZOLE (METROGEL) 0.75 % vaginal gel; Insert 1 Application into the vagina daily at bedtime for 5 days  -     fluconazole (DIFLUCAN) 150 mg tablet; Take one today and 1 in 3 days    Vaginal discharge  -     POCT wet mount  -     Mycoplasma / ureaplasma culture; Future    Vaginal odor  -     POCT wet mount    Screening for STDs (sexually transmitted diseases)  -     Chlamydia/GC amplified DNA by PCR      Results for orders placed or performed in visit on 11/22/23   POCT wet mount   Result Value Ref Range    Yeast, Wet Prep Neg     pH 5.5     Whiff Test Pos     Clue Cells Pos     Trich, Wet Prep Neg         Perineal hygiene reviewed with patient at length. Advised discontinuation of all OTC products including any flush able wipes or baby wipes. Advised to cleanse with water only or water with Dove Sensitive white bar soap only. Avoid any abrasive wash cloths or other abrasive items to vulvar tissues. May use a thin layer of coconut oil or A&D ointment to vulvar tissue to help protect skin and provide a barrier layer. Reviewed medications and usage instructions   We may need to try long term Metro gel and boric acid suppositories in re occurrence   Call if no symptom improvement, all questions answered, return for annual.     Jessi Robb is a 33 y.o. female here for vaginal complaints. She reports reoccurring BV, reports approx 3-4 episodes this year. She is frustrated at this time. Requesting mycoplasma testing    Reports Vaginal odor, & spotting, recent Termination @ 9 weeks  [facility]  Denies any treatments tried. Denies recent antibiotic use. Denies douching. Denies fever, pelvic pain or dyspareunia. Denies new sexual partners.   Agrees to STD testing     Vitals:    11/22/23 0934   BP: 124/78   BP Location: Left arm   Patient Position: Sitting   Cuff Size: Large   Weight: 89.8 kg (198 lb)   Height: 5' 1" (1.549 m) Body mass index is 37.41 kg/m². Allergies   Allergen Reactions    Flagyl [Metronidazole] Nausea Only    Macrobid [Nitrofurantoin] Nausea Only       Patient Active Problem List    Diagnosis Date Noted    Nausea/vomiting in pregnancy 10/05/2023    8 weeks gestation of pregnancy 10/05/2023    Neck pain 2023    Concussion with no loss of consciousness 2023    Food allergy 2023    Hypertriglyceridemia 2023    Other fatigue 2023    Panic attack 2021    COVID-19 2021    Gastroesophageal reflux disease without esophagitis 06/15/2021    Anemia 2019    BMI 33.0-33.9,adult 2018    Hemorrhoids 2018    Anxiety 2017    Migrainous headache without aura 2017     Past Medical History:   Diagnosis Date    Anemia 2336    Anemia complicating pregnancy     Anxiety     Asthma     Cerebral concussion 2017    Depression     GERD (gastroesophageal reflux disease)     Group B Streptococcus carrier, antepartum 2017    Head injury     Herpes     Migraine     Postpartum depression     Secondary amenorrhea 2020    Varicella     vaccine     Past Surgical History:   Procedure Laterality Date    APPENDECTOMY       SECTION      COSMETIC SURGERY      liposuction and buttocks    EGD      HEMORROIDECTOMY  2021    INDUCED   2012 2020    X 2    MD  DELIVERY ONLY N/A 2017    Procedure:  SECTION ();   Surgeon: Sarai Coleman DO;  Location: [facility] ;  Service: Obstetrics     Social History     Tobacco Use    Smoking status: Former     Types: Cigarettes     Quit date: 2014     Years since quittin.8    Smokeless tobacco: Never   Vaping Use    Vaping Use: Former    Substances: Flavoring   Substance Use Topics    Alcohol use: Not Currently     Comment: socially     Drug use: No       Current Outpatient Medications:     fluconazole (DIFLUCAN) 150 mg tablet, Take one today and 1 in 3 days, Disp: 2 tablet, Rfl: 0    metroNIDAZOLE (METROGEL) 0.75 % vaginal gel, Insert 1 Application into the vagina daily at bedtime for 5 days, Disp: 45 g, Rfl: 0    pantoprazole (PROTONIX) 40 mg tablet, Take 1 tablet (40 mg total) by mouth 2 (two) times a day for 14 days, Disp: 28 tablet, Rfl: 0    rifabutin (MYCOBUTIN) 150 mg capsule, Take 2 capsules (300 mg total) by mouth daily for 14 days, Disp: 28 capsule, Rfl: 0      OB History    Para Term  AB Living   8 2 2 0 4 2   SAB IAB Ectopic Multiple Live Births   1 3   0 2      # Outcome Date GA Lbr David/2nd Weight Sex Delivery Anes PTL Lv   8 Current            7 Term 17 39w6d  2930 g (6 lb 7.4 oz) M CS-LTranv EPI N JAMEY      Complications: Fetal Intolerance   6 Term 2015 39w0d  3175 g (7 lb) F    JAMEY   5 IAB            4 SAB            3             2 IAB            1 IAB               Obstetric Comments   Hx of , spontaneous complete    Normal delivery, 1 daughter      Patient reports she had an AB on 10/7/2023 @ 9 weeks        Review of Systems   Constitutional: Negative for chills, fatigue, fever and unexpected weight change. Respiratory: Negative for shortness of breath. Gastrointestinal: Negative abdominal pain, constipation and diarrhea. Genitourinary: Negative for difficulty urinating, dysuria and hematuria. Physical Exam   Constitutional: Appears well-developed and well-nourished. No distress. Alert and oriented. HENT: Atraumatic, Normocephalic. Conjunctivae clear  Neck: Normal range of motion. Pulmonary: Effort normal.  Abdominal: Soft. Negative for pain, tenderness or mass  Musculoskeletal: Normal ROM  Skin: Warm & Dry  Psychological: Normal mood, thought content, behavior & judgement       Pelvic exam was performed with patient supine, lithotomy position.       Labia: Right: Negative rash, tenderness, lesion or injury               Left: Negative rash, tenderness, lesion or injury   Urethral meatus:  Negative for  tenderness, inflammation or discharge. Uterus: not deviated, enlarged, fixed or tender. Cervix: No CMT, no discharge or friability. Right adnexa: no mass, no tenderness and no fullness. Left adnexa: no mass, no tenderness and no fullness. Vagina: No erythema, tenderness, masses, or foreign body in the vagina. No signs of injury around the vagina. Thin watery vaginal discharge with small amount of blood noted   Perineum without lesions, signs of injury, erythema or swelling. Inguinal Canal:        Right: No inguinal adenopathy or hernia present. Left: No inguinal adenopathy or hernia present.      OBGyn Exam

## 2023-11-22 ENCOUNTER — OFFICE VISIT (OUTPATIENT)
Dept: OBGYN CLINIC | Facility: CLINIC | Age: 28
End: 2023-11-22
Payer: COMMERCIAL

## 2023-11-22 VITALS
SYSTOLIC BLOOD PRESSURE: 124 MMHG | BODY MASS INDEX: 37.38 KG/M2 | HEIGHT: 61 IN | WEIGHT: 198 LBS | DIASTOLIC BLOOD PRESSURE: 78 MMHG

## 2023-11-22 DIAGNOSIS — B96.89 BV (BACTERIAL VAGINOSIS): Primary | ICD-10-CM

## 2023-11-22 DIAGNOSIS — N89.8 VAGINAL DISCHARGE: ICD-10-CM

## 2023-11-22 DIAGNOSIS — N89.8 VAGINAL ODOR: ICD-10-CM

## 2023-11-22 DIAGNOSIS — Z11.3 SCREENING FOR STDS (SEXUALLY TRANSMITTED DISEASES): ICD-10-CM

## 2023-11-22 DIAGNOSIS — N76.0 BV (BACTERIAL VAGINOSIS): Primary | ICD-10-CM

## 2023-11-22 PROBLEM — G89.29 CHRONIC LEFT-SIDED LOW BACK PAIN WITH BILATERAL SCIATICA: Status: RESOLVED | Noted: 2020-08-06 | Resolved: 2023-11-22

## 2023-11-22 PROBLEM — M54.42 CHRONIC LEFT-SIDED LOW BACK PAIN WITH BILATERAL SCIATICA: Status: RESOLVED | Noted: 2020-08-06 | Resolved: 2023-11-22

## 2023-11-22 PROBLEM — M54.41 CHRONIC LEFT-SIDED LOW BACK PAIN WITH BILATERAL SCIATICA: Status: RESOLVED | Noted: 2020-08-06 | Resolved: 2023-11-22

## 2023-11-22 PROCEDURE — 99213 OFFICE O/P EST LOW 20 MIN: CPT | Performed by: OBSTETRICS & GYNECOLOGY

## 2023-11-22 PROCEDURE — 87491 CHLMYD TRACH DNA AMP PROBE: CPT | Performed by: OBSTETRICS & GYNECOLOGY

## 2023-11-22 PROCEDURE — 87591 N.GONORRHOEAE DNA AMP PROB: CPT | Performed by: OBSTETRICS & GYNECOLOGY

## 2023-11-22 PROCEDURE — 87210 SMEAR WET MOUNT SALINE/INK: CPT | Performed by: OBSTETRICS & GYNECOLOGY

## 2023-11-22 RX ORDER — METRONIDAZOLE 7.5 MG/G
1 GEL VAGINAL
Qty: 45 G | Refills: 0 | Status: SHIPPED | OUTPATIENT
Start: 2023-11-22 | End: 2023-11-27

## 2023-11-22 RX ORDER — FLUCONAZOLE 150 MG/1
TABLET ORAL
Qty: 2 TABLET | Refills: 0 | Status: SHIPPED | OUTPATIENT
Start: 2023-11-22 | End: 2023-11-25

## 2023-11-22 NOTE — PATIENT INSTRUCTIONS
Perineal Hygiene      Your vaginal naturally takes care of its self, it is a self washing system, the less you mess the healthier it will be     No soaps or feminine wash to the vulva, these products can cause dermitis, bacterial infections and other vulvar problems. Use only water to cleanse, or water with Dove or [employer] if necessary. No scented lotions or products are advised in or near your vulva. Use only coconut oil for moisture if needed. No douching this may cause imbalance in your vaginal PH and further issues. If you wear panty liners, you may apply a thin coating of Vaseline, A&D ointment or coconut oil to the vulvar tissues as a skin barrier     Cotton underware, loose fitting clothing  Only perfume-free, dye-free laundry detergent, use a second rinse cycle   Avoid fabric softeners/dryer sheets. Your partner should avoid the same products as well. Over the counter probiotic to restore vaginal guillermo may be helpful as well, take daily. You may also look into Boric Acid vaginal suppositories to restore vaginal PH balance for up to 2 weeks as directed on the box. You may not use these if you are pregnant      For vaginal dryness: You may use:     Coconut oil (organic, pure, unscented) as needed for moisture or lubrication. ( Do not use if allergic)       Replens moisture restore external comfort gel daily ( use as directed on the box)        Replens long lasting vaginal moisturizer  ( use as directed on the box)         For Vaginal Lubrication:          You may use:     Coconut oil (organic, pure, unscented) as a lubricant or another scent-free lubricant (Astroglide, Uberlube) if needed.   Do not use coconut oil or silicone if using a condom as this may break down the integrity of the condom and cause an unplanned pregnancy              Do not use coconut oil if allergic               Replens silky smooth lubricant, premium silicone based lubricant for intercourse. ( use as directed, a small amount will provide an enhanced natural feeling)     Any premium over the counter vaginal lubricant water or silicone based. Silicone based will have more staying power. Bacterial Vaginosis   AMBULATORY CARE:   Bacterial vaginosis  is an infection in the vagina. It may cause vaginitis (irritation and inflammation of the vagina). The cause is not known. Bacteria normally found in the vagina are imbalanced. Your risk increases if you are sexually active, you use a douche, or you have an intrauterine device (IUD). Common signs and symptoms of bacterial vaginosis:   White, gray, or yellow vaginal discharge    Vaginal discharge that smells like fish    Itching or burning around the outside of your vagina    Call your doctor or gynecologist if:   Your symptoms come back or do not improve with treatment. You have vaginal bleeding that is not your monthly period. You have questions or concerns about your condition or care. Antibiotics  are given to kill the bacteria. They may be given as a pill or as a cream to put in your vagina. Bacterial vaginosis and pregnancy:  If you have bacterial vaginosis during pregnancy, your baby may be born early or have a low birth weight. Your healthcare provider may recommend testing for bacterial vaginosis before or during your pregnancy. He or she will talk to you about your risk for premature delivery, and make sure you know the benefits and risks of testing. Prevent bacterial vaginosis:   Keep your vaginal area clean and dry. Wear underwear and pantyhose with a cotton crotch. Wipe from front to back after you urinate or have a bowel movement. After you bathe, rinse soap from your vaginal area to decrease your risk for irritation. Do not use products that cause irritation. Always use unscented tampons or sanitary pads. Do not use feminine sprays, powders, or scented tampons.  They may cause irritation and increase your risk for vaginosis. Detergents and fabric softeners may also cause irritation. Do not use a douche. This can cause an imbalance in healthy vaginal bacteria. Use latex condoms during sex. This helps prevent another infection and keeps your partner from getting the infection. Follow up with your doctor or gynecologist as directed: Bacterial vaginosis increases the risk for several health problems, such as pelvic inflammatory disease (PID) or sexually transmitted infections. Work with your healthcare providers to schedule regular appointments to check for health problems. Write down your questions so you remember to ask them during your visits. © Copyright Lori Pappas 2023 Information is for End User's use only and may not be sold, redistributed or otherwise used for commercial purposes. The above information is an  only. It is not intended as medical advice for individual conditions or treatments. Talk to your doctor, nurse or pharmacist before following any medical regimen to see if it is safe and effective for you.

## 2023-11-23 LAB
C TRACH DNA SPEC QL NAA+PROBE: NEGATIVE
N GONORRHOEA DNA SPEC QL NAA+PROBE: NEGATIVE

## 2023-11-24 ENCOUNTER — TELEPHONE (OUTPATIENT)
Dept: OBGYN CLINIC | Facility: CLINIC | Age: 28
End: 2023-11-24

## 2023-11-24 DIAGNOSIS — Z11.3 SCREENING EXAMINATION FOR STD (SEXUALLY TRANSMITTED DISEASE): Primary | ICD-10-CM

## 2023-11-24 NOTE — TELEPHONE ENCOUNTER
I spoke with the lab in [facility], the message was that She was supposed to wait at the lab until they corrected the order, there is a new order in for the urine , she can go back to the lab

## 2023-11-24 NOTE — TELEPHONE ENCOUNTER
Pt calling about testing ordered, went to lab this morning, but was advised she needed a swab, not urine.  Upset and would to know what she needs to do

## 2023-11-25 ENCOUNTER — APPOINTMENT (OUTPATIENT)
Dept: LAB | Facility: HOSPITAL | Age: 28
End: 2023-11-25
Payer: COMMERCIAL

## 2023-11-25 DIAGNOSIS — Z11.3 SCREENING EXAMINATION FOR STD (SEXUALLY TRANSMITTED DISEASE): ICD-10-CM

## 2023-11-25 PROCEDURE — 87563 M. GENITALIUM AMP PROBE: CPT

## 2023-11-25 PROCEDURE — 87109 MYCOPLASMA: CPT

## 2023-11-25 PROCEDURE — 87661 TRICHOMONAS VAGINALIS AMPLIF: CPT

## 2023-11-26 LAB
M GENITALIUM DNA SPEC QL NAA+PROBE: POSITIVE
T VAGINALIS DNA SPEC QL NAA+PROBE: NEGATIVE

## 2023-11-29 DIAGNOSIS — A49.3 INFECTION DUE TO MYCOPLASMA GENITALIUM: Primary | ICD-10-CM

## 2023-11-29 RX ORDER — DOXYCYCLINE 100 MG/1
100 CAPSULE ORAL 2 TIMES DAILY
Qty: 14 CAPSULE | Refills: 0 | Status: SHIPPED | OUTPATIENT
Start: 2023-11-29 | End: 2023-12-06

## 2023-11-29 RX ORDER — MOXIFLOXACIN HYDROCHLORIDE 400 MG/1
400 TABLET ORAL DAILY
Qty: 7 TABLET | Refills: 0 | Status: SHIPPED | OUTPATIENT
Start: 2023-11-29 | End: 2023-12-06

## 2023-11-29 NOTE — PROGRESS NOTES
Spoke with pt, reviewed results + for mycoplasma genitalium  Order sent in according to up-to-date recommendations for antibiotic treatment  Advised to take a probiotic daily,  eat yogurt daily if tolerated  Call with any issues or concerns  Patient already has a prescription for Diflucan as she is currently taking MetroGel for BV. Patient advised she may wait to take Diflucan until she starts exhibiting yeast like symptoms  Patient is to see GI tomorrow possibly will need antibiotics for H. pylori.   Patient advised to discuss with GI physician that she is currently going to be taking 2 courses of antibiotics over the next 2 weeks

## 2023-11-30 ENCOUNTER — OFFICE VISIT (OUTPATIENT)
Age: 28
End: 2023-11-30
Payer: COMMERCIAL

## 2023-11-30 VITALS
SYSTOLIC BLOOD PRESSURE: 120 MMHG | OXYGEN SATURATION: 100 % | DIASTOLIC BLOOD PRESSURE: 80 MMHG | RESPIRATION RATE: 16 BRPM | HEIGHT: 61 IN | BODY MASS INDEX: 35.87 KG/M2 | WEIGHT: 190 LBS | HEART RATE: 83 BPM

## 2023-11-30 DIAGNOSIS — A04.8 H. PYLORI INFECTION: Primary | ICD-10-CM

## 2023-11-30 PROCEDURE — 99214 OFFICE O/P EST MOD 30 MIN: CPT | Performed by: PHYSICIAN ASSISTANT

## 2023-11-30 RX ORDER — PANTOPRAZOLE SODIUM 40 MG/1
40 TABLET, DELAYED RELEASE ORAL 2 TIMES DAILY
Qty: 60 TABLET | Refills: 1 | Status: SHIPPED | OUTPATIENT
Start: 2023-11-30 | End: 2024-01-29

## 2023-11-30 NOTE — PROGRESS NOTES
[facility] - Outpatient Follow-up Note  Catherine Almonte 33 y.o. female MRN: [id number]  Encounter: [id number]          ASSESSMENT AND PLAN:      1. H. pylori infection    Patient has a long history of h pylori. She has previously been treated with Clarithromycin based triple therapy which was not successful, then Quadruple therapy which was not successful, and also high dose dual Amoxicillin therapy. She was given Rifabutin therapy over the summer which she recently took in early October but reports she did not take the Amoxicillin component of this regimen. Last EGD in September of 2022 showed minimal duodenal bulb erythema. Biopsies showed h pylori and were negative for intestinal metaplasia. Will check a stool h pylori for VIRGIE. If stool h pylori is negative, will treat her dyspepsia symptoms with a Pantoprazole course. She is currently on a probiotic course.   _____________________________________________________________________    SUBJECTIVE:  Patient is a pleasant 33year old female who presents to the office for follow up. She has a history of h pylori and has been on multiple regimens. She was given Rifabutin therapy over the summer which she recently took in early October but reports she did not take the Amoxicillin component of this regimen. She reports of nausea and LUQ discomfort. No vomiting. No melena. She reports she will be going on an antibiotic regimen for mycoplasma from her gyn starting tomorrow (we reviewed that she would need to submit the stool test before beginning the antibiotics and we would need to treat the h pylori at a later date after her gyn treatment if it comes back positive). REVIEW OF SYSTEMS IS OTHERWISE NEGATIVE.       Historical Information   Past Medical History:   Diagnosis Date    Anemia 83/3/9852    Anemia complicating pregnancy 98/59/1535    Anxiety     Asthma     Cerebral concussion 03/17/2017    Depression     GERD (gastroesophageal reflux disease)     Group B Streptococcus carrier, antepartum 2017    Head injury     Herpes     Migraine     Postpartum depression     Secondary amenorrhea 2020    Varicella     vaccine     Past Surgical History:   Procedure Laterality Date    APPENDECTOMY       SECTION      COSMETIC SURGERY      liposuction and buttocks    EGD      HEMORROIDECTOMY  2021    INDUCED   2012 2020    X 2    MA  DELIVERY ONLY N/A 2017    Procedure:  SECTION (); Surgeon: Sarah Burleson DO;  Location: [facility];  Service: Obstetrics     Social History   Social History     Substance and Sexual Activity   Alcohol Use Not Currently    Comment: socially      Social History     Substance and Sexual Activity   Drug Use No     Social History     Tobacco Use   Smoking Status Former    Types: Cigarettes    Quit date: 2014    Years since quittin.9   Smokeless Tobacco Never     Family History   Problem Relation Age of Onset    Asthma Mother     Depression Mother     Migraines Mother     Diabetes type II Mother     Other Mother         vertigo    Diabetes Maternal Aunt     Hypertension Maternal Aunt     Diabetes Maternal Grandmother     Hypertension Maternal Grandmother     Breast cancer Maternal Grandmother     Migraines Father     Heart murmur Brother     Lung cancer Paternal Grandmother     Stroke Paternal Grandmother        Meds/Allergies       Current Outpatient Medications:     doxycycline monohydrate (MONODOX) 100 mg capsule    moxifloxacin (AVELOX) 400 MG tablet    pantoprazole (PROTONIX) 40 mg tablet    rifabutin (MYCOBUTIN) 150 mg capsule    Allergies   Allergen Reactions    Flagyl [Metronidazole] Nausea Only    Macrobid [Nitrofurantoin] Nausea Only           Objective     Blood pressure 120/80, pulse 83, resp. rate 16, height 5' 1" (1.549 m), weight 86.2 kg (190 lb), last menstrual period 2023, SpO2 100 %, unknown if currently breastfeeding.  Body mass index is 35.9 kg/m². PHYSICAL EXAM:      General Appearance:   Alert, cooperative, no distress   HEENT:   Normocephalic, atraumatic, anicteric. Neck:  Supple, symmetrical, trachea midline   Lungs:   Clear to auscultation bilaterally; no rales, rhonchi or wheezing; respirations unlabored    Heart[de-identified]   Regular rate and rhythm; no murmur, rub, or gallop. Abdomen:   Soft, non-tender, non-distended; normal bowel sounds; no masses, no organomegaly    Genitalia:   Deferred    Rectal:   Deferred    Extremities:  No cyanosis, clubbing or edema    Pulses:  2+ and symmetric    Skin:  No jaundice, rashes, or lesions    Lymph nodes:  No palpable cervical lymphadenopathy        Lab Results:   No visits with results within 1 Day(s) from this visit. Latest known visit with results is:   Appointment on 11/25/2023   Component Date Value    Trichomonas vaginalis 11/25/2023 Negative     Mycoplasma genitalium 11/25/2023 Positive (A)          Radiology Results:   XR chest portable    Result Date: 11/29/2023  Narrative: Clinical history: Chest pain. Comment: A single PA erect view of the chest was performed and compared with the prior study dated 9/4/2022. The heart is unenlarged. The lungs are clear. There is no pleural effusion or pneumothorax. The visualized osseous structures are grossly intact. Impression: IMPRESSION: No acute cardiopulmonary disease.  [id number]

## 2023-12-03 LAB
M HOMINIS SPEC QL CULT: POSITIVE
U UREALYTICUM SPEC QL CULT: NEGATIVE

## 2023-12-06 ENCOUNTER — APPOINTMENT (OUTPATIENT)
Age: 28
End: 2023-12-06
Payer: COMMERCIAL

## 2023-12-06 DIAGNOSIS — A04.8 H. PYLORI INFECTION: ICD-10-CM

## 2023-12-06 PROCEDURE — 87338 HPYLORI STOOL AG IA: CPT

## 2023-12-07 ENCOUNTER — TELEPHONE (OUTPATIENT)
Dept: OBGYN CLINIC | Facility: CLINIC | Age: 28
End: 2023-12-07

## 2023-12-07 DIAGNOSIS — A49.3 MYCOPLASMA INFECTION: Primary | ICD-10-CM

## 2023-12-07 RX ORDER — MOXIFLOXACIN HYDROCHLORIDE 400 MG/1
400 TABLET ORAL DAILY
Qty: 7 TABLET | Refills: 0 | Status: SHIPPED | OUTPATIENT
Start: 2023-12-07 | End: 2023-12-14

## 2023-12-07 NOTE — TELEPHONE ENCOUNTER
----- Message from [facility] sent at 12/7/2023 12:53 PM EST -----  Regarding: Medicine   Contact: [phone]  Katelyn Hopper,  The pharmacy [facility] the Moxifloxacin was sent to doesn’t have any in stock in store or in their warehouse. Would you be able to send it to another pharmacy for me?  [facility] [address] in [place] preferably

## 2023-12-07 NOTE — PROGRESS NOTES
Original prescription for moxifloxacin sent to pharmacy,  pharmacy did not have it in stock represcribed to [facility] as requested

## 2023-12-08 LAB — H PYLORI AG STL QL IA: POSITIVE

## 2023-12-21 ENCOUNTER — OFFICE VISIT (OUTPATIENT)
Age: 28
End: 2023-12-21
Payer: COMMERCIAL

## 2023-12-21 VITALS
BODY MASS INDEX: 35.87 KG/M2 | DIASTOLIC BLOOD PRESSURE: 82 MMHG | WEIGHT: 190 LBS | HEIGHT: 61 IN | SYSTOLIC BLOOD PRESSURE: 112 MMHG

## 2023-12-21 DIAGNOSIS — N92.6 IRREGULAR UTERINE BLEEDING: Primary | ICD-10-CM

## 2023-12-21 DIAGNOSIS — A49.3 MYCOPLASMA INFECTION: ICD-10-CM

## 2023-12-21 DIAGNOSIS — B37.49 CANDIDA INFECTION OF GENITAL REGION: ICD-10-CM

## 2023-12-21 PROBLEM — M54.2 NECK PAIN: Status: RESOLVED | Noted: 2023-06-13 | Resolved: 2023-12-21

## 2023-12-21 PROBLEM — Z3A.08 8 WEEKS GESTATION OF PREGNANCY: Status: RESOLVED | Noted: 2023-10-05 | Resolved: 2023-12-21

## 2023-12-21 PROBLEM — U07.1 COVID-19: Status: RESOLVED | Noted: 2021-08-27 | Resolved: 2023-12-21

## 2023-12-21 PROBLEM — O21.9 NAUSEA/VOMITING IN PREGNANCY: Status: RESOLVED | Noted: 2023-10-05 | Resolved: 2023-12-21

## 2023-12-21 PROCEDURE — 99213 OFFICE O/P EST LOW 20 MIN: CPT | Performed by: NURSE PRACTITIONER

## 2023-12-21 RX ORDER — FLUCONAZOLE 150 MG/1
150 TABLET ORAL ONCE
Qty: 2 TABLET | Refills: 0 | Status: SHIPPED | OUTPATIENT
Start: 2023-12-21 | End: 2023-12-21

## 2023-12-21 NOTE — PATIENT INSTRUCTIONS
Pelvic Ultrasound   WHAT YOU NEED TO KNOW:   What do I need to know about a pelvic ultrasound?  A pelvic ultrasound is a test that uses sound waves to look at your uterus, ovaries, or other pelvic organs. It can help your healthcare provider diagnose, monitor, or treat a medical condition. It may also be done during pregnancy to see your unborn baby. A pelvic ultrasound does not expose you or your baby to radiation.       How do I prepare for a pelvic ultrasound?  Your healthcare provider will talk to you about the test. You will need to drink 5 to 6 glasses of water 1 to 2 hours before your test. After you drink the water, do not urinate until you are told it is okay to do so. A full bladder will help your healthcare provider see your uterus and ovaries better.  What will happen during a pelvic ultrasound?   You will lie on a table. Your healthcare provider will put gel on your lower abdomen. He or she will then move a device called a transducer over that area. The transducer uses sound waves to make images of your uterus, pelvic organs, or unborn baby. You may be asked to move into other positions so your healthcare provider can get better images. When he or she is done, you will be able to urinate.    Your healthcare provider may also need to insert a transducer into your vagina. This is called a transvaginal ultrasound. It is done to help your healthcare provider see your uterus and ovaries better.    CARE AGREEMENT:   You have the right to help plan your care. Learn about your health condition and how it may be treated. Discuss treatment options with your healthcare providers to decide what care you want to receive. You always have the right to refuse treatment. The above information is an  only. It is not intended as medical advice for individual conditions or treatments. Talk to your doctor, nurse or pharmacist before following any medical regimen to see if it is safe and effective for you.  ©  Copyright [employer] 2023 Information is for End User's use only and may not be sold, redistributed or otherwise used for commercial purposes.

## 2023-12-21 NOTE — PROGRESS NOTES
Diagnoses and all orders for this visit:    Irregular uterine bleeding  -     US pelvis complete w transvaginal; Future    Mycoplasma infection    Candida infection of genital region  -     fluconazole (DIFLUCAN) 150 mg tablet; Take 1 tablet (150 mg total) by mouth once for 1 dose Once and repeat in 3 days        Call if no symptom improvement, all questions answered, return for annual.        Pleasant 28 y.o.  patient had a TOP on 10/7/2023 in [place] and ever since she has been spotting almost every day. This last period was heavier then what she normally experiences. She was recently treated for Mycoplasma 2 weeks ago by [place]. She denies any issues with fevers or pelvic pain. GC/CT/Trich neg 2023. She is not currently sexually active and declines a BCM today.     Past Medical History:   Diagnosis Date    Anemia 10/6/2017    Anemia complicating pregnancy 10/06/2017    Anxiety     Asthma     Cerebral concussion 2017    COVID-19     Depression     GERD (gastroesophageal reflux disease)     Group B Streptococcus carrier, antepartum 2017    Head injury     Herpes     Migraine     Peptic ulceration     Postpartum depression     Secondary amenorrhea 2020    Varicella     vaccine     Past Surgical History:   Procedure Laterality Date    APPENDECTOMY       SECTION      COSMETIC SURGERY      liposuction and buttocks    EGD      HEMORROIDECTOMY  2021    INDUCED   2012 2020    X 2    ME  DELIVERY ONLY N/A 2017    Procedure:  SECTION ();  Surgeon: Jai Gilbert DO;  Location: [facility];  Service: Obstetrics     Social History     Tobacco Use    Smoking status: Former     Current packs/day: 0.00     Types: Cigarettes     Quit date: 2014     Years since quittin.9    Smokeless tobacco: Never   Vaping Use    Vaping status: Former    Substances: Flavoring   Substance Use Topics    Alcohol use: Not Currently     Comment: socially     Drug use: No     Family  History   Problem Relation Age of Onset    Asthma Mother     Depression Mother     Migraines Mother     Diabetes type II Mother     Other Mother         vertigo    Diabetes Maternal Aunt     Hypertension Maternal Aunt     Diabetes Maternal Grandmother     Hypertension Maternal Grandmother     Breast cancer Maternal Grandmother     Migraines Father     Heart murmur Brother     Lung cancer Paternal Grandmother     Stroke Paternal Grandmother        Current Outpatient Medications:     fluconazole (DIFLUCAN) 150 mg tablet, Take 1 tablet (150 mg total) by mouth once for 1 dose Once and repeat in 3 days, Disp: 2 tablet, Rfl: 0    Allergies   Allergen Reactions    Flagyl [Metronidazole] Nausea Only    Macrobid [Nitrofurantoin] Nausea Only     OB History    Para Term  AB Living   8 2 2 0 4 2   SAB IAB Ectopic Multiple Live Births   1 3   0 2      # Outcome Date GA Lbr David/2nd Weight Sex Delivery Anes PTL Lv   8             7 Term 17 39w6d  2930 g (6 lb 7.4 oz) M CS-LTranv EPI N JAMEY      Complications: Fetal Intolerance   6 Term 2015 39w0d  3175 g (7 lb) F    JAMEY   5 IAB            4 SAB            3             2 IAB            1 IAB               Obstetric Comments   Hx of , spontaneous complete    Normal delivery, 2015 daughter      Patient reports she had an AB on 10/7/2023 @ 9 weeks        Vitals:    23 1411   BP: 112/82     Body mass index is 35.9 kg/m².    Review of Systems   Constitutional: Negative for chills, fatigue, fever and unexpected weight change.   Respiratory: Negative for shortness of breath.    Gastrointestinal: Negative for anal bleeding, blood in stool, constipation and diarrhea.   Genitourinary: Negative for difficulty urinating, dysuria and hematuria.     Physical Exam   Constitutional: She appears well-developed and well-nourished. No distress.   HENT: atraumatic, EOMI bilaterally  Head: Normocephalic.   Neck: Normal range of motion. Neck  supple.   Pulmonary: Effort normal.   Abdominal: Soft. Nontender.  Extremities: moves all extremities well, no edema noted upper or lower  Skin: clean, dry and intact, warm to touch  Pelvic exam was performed with patient supine, lithotomy position.    Labia: Right: Negative rash, tenderness, lesion or injury               Left: Negative rash, tenderness, lesion or injury   Urethral meatus:  Negative for  tenderness, inflammation or discharge.   Uterus: not deviated, enlarged, fixed or tender.   Cervix: No CMT, no discharge or friability.   Right adnexa: no mass, no tenderness and no fullness.  Left adnexa: no mass, no tenderness and no fullness.   Vagina: No erythema, tenderness, masses, or foreign body in the vagina. No signs of injury around the vagina. Small amount of blood noted   Perineum without lesions, signs of injury, erythema or swelling.  Inguinal Canal:        Right: No inguinal adenopathy or hernia present.        Left: No inguinal adenopathy or hernia present.

## 2023-12-22 DIAGNOSIS — A04.8 H. PYLORI INFECTION: Primary | ICD-10-CM

## 2023-12-22 RX ORDER — AMOXICILLIN 250 MG/1
750 CAPSULE ORAL EVERY 8 HOURS SCHEDULED
Qty: 126 CAPSULE | Refills: 0 | Status: SHIPPED | OUTPATIENT
Start: 2023-12-22 | End: 2024-01-05

## 2023-12-22 RX ORDER — PANTOPRAZOLE SODIUM 40 MG/1
40 TABLET, DELAYED RELEASE ORAL 2 TIMES DAILY
Qty: 28 TABLET | Refills: 0 | Status: SHIPPED | OUTPATIENT
Start: 2023-12-22 | End: 2024-01-05

## 2023-12-22 RX ORDER — RIFABUTIN 150 MG/1
300 CAPSULE ORAL DAILY
Qty: 28 CAPSULE | Refills: 0 | Status: SHIPPED | OUTPATIENT
Start: 2023-12-22 | End: 2024-01-05

## 2024-01-05 ENCOUNTER — HOSPITAL ENCOUNTER (OUTPATIENT)
Dept: ULTRASOUND IMAGING | Facility: HOSPITAL | Age: 29
End: 2024-01-05
Payer: COMMERCIAL

## 2024-01-05 DIAGNOSIS — N92.6 IRREGULAR UTERINE BLEEDING: ICD-10-CM

## 2024-01-05 PROCEDURE — 76856 US EXAM PELVIC COMPLETE: CPT

## 2024-01-05 PROCEDURE — 76830 TRANSVAGINAL US NON-OB: CPT

## 2024-01-30 NOTE — INTERVAL H&P NOTE
H&P reviewed  After examining the patient I find no changes in the patients condition since the H&P had been written      Vitals:    03/11/20 0645   BP: 110/66   Pulse: 77   Resp: 14   Temp: 97 9 °F (36 6 °C)   SpO2: 100%
H&P reviewed  After examining the patient I find no changes in the patients condition since the H&P had been written  There were no vitals filed for this visit 
wheelchair

## 2024-02-22 ENCOUNTER — TELEPHONE (OUTPATIENT)
Age: 29
End: 2024-02-22

## 2024-02-22 ENCOUNTER — OFFICE VISIT (OUTPATIENT)
Age: 29
End: 2024-02-22
Payer: COMMERCIAL

## 2024-02-22 VITALS
HEIGHT: 61 IN | SYSTOLIC BLOOD PRESSURE: 120 MMHG | HEART RATE: 75 BPM | RESPIRATION RATE: 20 BRPM | DIASTOLIC BLOOD PRESSURE: 70 MMHG | OXYGEN SATURATION: 100 % | WEIGHT: 192.4 LBS | TEMPERATURE: 98.1 F | BODY MASS INDEX: 36.32 KG/M2

## 2024-02-22 DIAGNOSIS — E55.9 VITAMIN D INSUFFICIENCY: ICD-10-CM

## 2024-02-22 DIAGNOSIS — R53.83 OTHER FATIGUE: ICD-10-CM

## 2024-02-22 DIAGNOSIS — Z11.3 SCREENING EXAMINATION FOR SEXUALLY TRANSMITTED DISEASE: ICD-10-CM

## 2024-02-22 DIAGNOSIS — F41.0 PANIC ATTACK: ICD-10-CM

## 2024-02-22 DIAGNOSIS — A04.8 H. PYLORI INFECTION: ICD-10-CM

## 2024-02-22 DIAGNOSIS — F41.9 ANXIETY: ICD-10-CM

## 2024-02-22 DIAGNOSIS — Z01.84 IMMUNITY STATUS TESTING: ICD-10-CM

## 2024-02-22 DIAGNOSIS — E78.1 HYPERTRIGLYCERIDEMIA: ICD-10-CM

## 2024-02-22 DIAGNOSIS — E55.9 INADEQUATE VITAMIN D AND VITAMIN D DERIVATIVE INTAKE: ICD-10-CM

## 2024-02-22 DIAGNOSIS — Z00.00 HEALTHCARE MAINTENANCE: Primary | ICD-10-CM

## 2024-02-22 DIAGNOSIS — Z11.1 TUBERCULOSIS SCREENING: ICD-10-CM

## 2024-02-22 DIAGNOSIS — Z12.4 SCREENING FOR CERVICAL CANCER: ICD-10-CM

## 2024-02-22 DIAGNOSIS — R73.9 ELEVATED BLOOD SUGAR: ICD-10-CM

## 2024-02-22 PROBLEM — Z76.89 ENCOUNTER FOR WEIGHT MANAGEMENT: Status: ACTIVE | Noted: 2024-02-22

## 2024-02-22 PROCEDURE — 99395 PREV VISIT EST AGE 18-39: CPT | Performed by: FAMILY MEDICINE

## 2024-02-22 PROCEDURE — 99214 OFFICE O/P EST MOD 30 MIN: CPT | Performed by: FAMILY MEDICINE

## 2024-02-22 RX ORDER — PANTOPRAZOLE SODIUM 40 MG/1
40 TABLET, DELAYED RELEASE ORAL 2 TIMES DAILY
Qty: 28 TABLET | Refills: 0 | Status: CANCELLED | OUTPATIENT
Start: 2024-02-22 | End: 2024-03-07

## 2024-02-22 RX ORDER — RIFABUTIN 150 MG/1
300 CAPSULE ORAL DAILY
Qty: 28 CAPSULE | Refills: 0 | Status: CANCELLED | OUTPATIENT
Start: 2024-02-22 | End: 2024-03-07

## 2024-02-22 NOTE — ASSESSMENT & PLAN NOTE
Struggling to lose weight, weight changes over the last 12  months    Initial weight (2/22/2024): 192 lbs, BMI 36.35 kg/m² .   TWL: -- lbs    Wt Readings from Last 3 Encounters:   02/22/24 87.3 kg (192 lb 6.4 oz)   12/21/23 86.2 kg (190 lb)   11/30/23 86.2 kg (190 lb)     Body mass index is 36.35 kg/m².    Discussed intermittent fasting and its benefits, 14hr>16hr>18hrs.   Discussed importance of low-carb diet.   Discussed exercise recommendation of 200-300 minutes per week, longer sessions would be rec.  Recent labs reviewed.     Plan  --   Continue to work on low-carb diet, limit snacking, exercise regularly 200-300 minutes/week as discussed  Return in - to continue to monitor weight management and adjust as needed.

## 2024-02-22 NOTE — PROGRESS NOTES
[facility]  [address] [place]    Name: Katherin Iverson      YOB: 1995      MRN: [id number]  Encounter Provider: Santi Nick MD      Encounter Date: 02/22/24    Assessment & Plan     Alcohol/drug use: discussed moderation in alcohol intake, the recommendations for healthy alcohol use, and avoidance of illicit drug use.  Dental Health: discussed importance of regular tooth brushing, flossing, and dental visits.  Injury prevention: discussed safety/seat belts, safety helmets, smoke detectors, carbon dioxide detectors, and smoking near bedding or upholstery.  Sexual health: discussed sexually transmitted diseases, partner selection, use of condoms, avoidance of unintended pregnancy, and contraceptive alternatives.  Exercise: the importance of regular exercise/physical activity was discussed. Recommend exercise 3-5 times per week for at least 30 minutes.       Depression Screening and Follow-up Plan: Patient was screened for depression during today's encounter. They screened negative with a PHQ-2 score of 0.        1. Healthcare maintenance  Assessment & Plan:  Health maintenance completed today  - Medical history reviewed, including existing medical conditions, medications, and surgeries.   - Labs discussed to evaluate cholesterol, blood sugar, kidney function, liver function, and other important markers of health.  - BMI evaluated and discussed.  - Lifestyle and health counseling completed including diet, exercise habits, smoking status, alcohol consumption.   - Bone & Heart health reviewed  - Cancer screenings discussed: Mammogram/Pap smear/CT lung/colonoscopy.   - Vaccination status reviewed and pertinent immunizations and booster shots discussed.  - Skin examination: Discussed importance of sunscreen and other preventative measures for skin cancer.  - Mental health and wellbeing evaluated and discussed.  - Family  history obtained to identify any of hereditary health risks.    Last screenings completed:  Pap smear  05/17/2017     Plan   Lab orders in place, f/u results.   Start/continue preventative measures as discussed/advised  Complete preventative orders in place as recommended.   Return in 1-2 weeks after completion of labs.      Orders:  -     CBC and differential; Future; Expected date: 02/22/2024  -     Comprehensive metabolic panel; Future; Expected date: 02/22/2024  -     Lipid Panel with Direct LDL reflex; Future; Expected date: 02/22/2024  -     TSH, 3rd generation with Free T4 reflex; Future; Expected date: 02/22/2024  -     Vitamin D 25 hydroxy; Future  -     Hemoglobin A1C; Future; Expected date: 02/22/2024    2. Hypertriglyceridemia  Assessment & Plan:  Previously increased triglycerides.  Follow-up with blood work, return in 1 to 2-week after completion of labs.    Orders:  -     Lipid Panel with Direct LDL reflex; Future; Expected date: 02/22/2024    3. Anxiety  Assessment & Plan:  Previously on Xanax for anxiety prescribed by previous PCP.  Follow-up with blood work  Return in 1 to 2 weeks after completion of labs.    Orders:  -     CBC and differential; Future; Expected date: 02/22/2024  -     Comprehensive metabolic panel; Future; Expected date: 02/22/2024  -     TSH, 3rd generation with Free T4 reflex; Future; Expected date: 02/22/2024    4. H. pylori infection  Assessment & Plan:  Currently being treated, follows GI.  Refill for rifabutin and pantoprazole requested.  Has amoxicillin antibiotic  Discuss further at next appointment.      5. Other fatigue  -     CBC and differential; Future; Expected date: 02/22/2024  -     Comprehensive metabolic panel; Future; Expected date: 02/22/2024  -     TSH, 3rd generation with Free T4 reflex; Future; Expected date: 02/22/2024    6. Panic attack  -     TSH, 3rd generation with Free T4 reflex; Future; Expected date: 02/22/2024    7. Vitamin D insufficiency  -      Vitamin D 25 hydroxy; Future    8. Inadequate vitamin D and vitamin D derivative intake  -     Vitamin D 25 hydroxy; Future    9. Elevated blood sugar  -     Hemoglobin A1C; Future; Expected date: 02/22/2024    10. Screening for cervical cancer  -     Ambulatory referral to Obstetrics / Gynecology; Future    11. Tuberculosis screening  -     Quantiferon TB Gold Plus Assay; Future; Expected date: 02/22/2024    12. Screening examination for sexually transmitted disease  -     RPR-Syphilis Screening (Total Syphilis IGG/IGM); Future    13. Immunity status testing  -     Hepatitis B Immunity Panel; Future            Follow-up Plans   Return in about 1 week (around 2/29/2024) for after completion of labs.    _____________________________________________________________________  Subjective     Katherin Iverson is a 28 y.o. who is here for annual physical exam.    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        Concerns today: Yes, describe: needs form signed for school.     Diet and Physical Activity  Diet: poor diet  Exercise: no formal exercise  Do you struggle with your weight? Yes, describe: gaining weight    General Health  Sleep: gets 7-8 hours of sleep on average and experiences daytime hypersomnolence   Hearing: normal - bilateral  Vision: wears glasses, 2 years ago.   Dental: regular dental visits, brushes teeth twice daily, and flosses teeth occasionally    Mental Health  PHQ-2/9 Depression Screening    Little interest or pleasure in doing things: 0 - not at all  Feeling down, depressed, or hopeless: 0 - not at all  PHQ-2 Score: 0  PHQ-2 Interpretation: Negative depression screen       Anxiety: Yes, describe: previously on antianxiolytics. Zanax from previous PCP.   History of SI/SH: No  Significant past trauma that has impacted patient's mental health: Yes, describe: childhood trauma.     /GYN Health  Urinary symptoms: none  Menstrual cycles: Menstrual History: regular periods, no issues  Sexually  Active: No, 1 partner in the past 12 months.   not sexually active    Smoking/Alcohol Use:  Smoking Cig: No  Vaping: No  Recreational drugs: No  Alcohol consumption: Yes, describe: occasionally     Review of Systems   Constitutional:  Negative for chills, fever and unexpected weight change.   HENT:  Negative for congestion, rhinorrhea and sore throat.    Eyes:  Negative for visual disturbance.   Respiratory:  Negative for chest tightness, shortness of breath and wheezing.    Cardiovascular:  Negative for chest pain.   Gastrointestinal:  Negative for abdominal pain, constipation, diarrhea, nausea and vomiting.   Endocrine: Negative for polyuria.   Genitourinary:  Negative for dysuria and hematuria.   Skin:  Negative for rash.   Neurological:  Negative for dizziness, weakness, light-headedness and headaches.   Psychiatric/Behavioral:  Negative for confusion.        Allergies:   Allergies   Allergen Reactions    Flagyl [Metronidazole] Nausea Only    Macrobid [Nitrofurantoin] Nausea Only        Social history:   Social Determinants of Health     Tobacco Use: Medium Risk (2/22/2024)    Patient History     Smoking Tobacco Use: Former     Smokeless Tobacco Use: Never     Passive Exposure: Past   Alcohol Use: Unknown (5/4/2021)    AUDIT-C     Frequency of Alcohol Consumption: Monthly or less     Average Number of Drinks: Not on file     Frequency of Binge Drinking: Not on file   Financial Resource Strain: Not on file   Food Insecurity: Not on file   Transportation Needs: Not on file   Physical Activity: Inactive (2/22/2024)    Exercise Vital Sign     Days of Exercise per Week: 0 days     Minutes of Exercise per Session: 0 min   Stress: Not on file   Social Connections: Not on file   Intimate Partner Violence: Not on file   Depression: Not at risk (2/22/2024)    PHQ-2     PHQ-2 Score: 0   Housing Stability: Not on file   Utilities: Not on file   Health Literacy: Not on file        Surgical history:   Past Surgical History:    Procedure Laterality Date    APPENDECTOMY       SECTION      COSMETIC SURGERY      liposuction and buttocks    EGD      HEMORROIDECTOMY  2021    INDUCED    2020    X 2    NM  DELIVERY ONLY N/A 2017    Procedure:  SECTION ();  Surgeon: Jai Gilbert DO;  Location: [facility] ;  Service: Obstetrics        Family history:  Family History   Problem Relation Age of Onset    Asthma Mother     Depression Mother     Migraines Mother     Diabetes type II Mother     Other Mother         vertigo    Diabetes Maternal Aunt     Hypertension Maternal Aunt     Diabetes Maternal Grandmother     Hypertension Maternal Grandmother     Breast cancer Maternal Grandmother     Migraines Father     Heart murmur Brother     Lung cancer Paternal Grandmother     Stroke Paternal Grandmother             Current Medication List     Current Outpatient Medications:     pantoprazole (PROTONIX) 40 mg tablet, Take 1 tablet (40 mg total) by mouth 2 (two) times a day for 14 days, Disp: 28 tablet, Rfl: 0    rifabutin (MYCOBUTIN) 150 mg capsule, Take 2 capsules (300 mg total) by mouth daily for 14 days, Disp: 28 capsule, Rfl: 0      Objective     Vitals:    24 1340   BP: 120/70   Pulse: 75   Resp: 20   Temp: 98.1 °F (36.7 °C)   SpO2: 100%       Physical Exam  Vitals reviewed.   Constitutional:       General: She is not in acute distress.     Appearance: Normal appearance. She is obese. She is not ill-appearing, toxic-appearing or diaphoretic.   HENT:      Head: Normocephalic and atraumatic.      Right Ear: External ear normal.      Left Ear: External ear normal.      Nose: Nose normal.      Mouth/Throat:      Mouth: Mucous membranes are moist.   Eyes:      General: No scleral icterus.        Right eye: No discharge.         Left eye: No discharge.      Extraocular Movements: Extraocular movements intact.      Conjunctiva/sclera: Conjunctivae normal.   Cardiovascular:      Rate and Rhythm: Normal  rate and regular rhythm.      Pulses: Normal pulses.      Heart sounds: Normal heart sounds.   Pulmonary:      Effort: Pulmonary effort is normal. No respiratory distress.      Breath sounds: Normal breath sounds.   Abdominal:      Palpations: Abdomen is soft.      Tenderness: There is no abdominal tenderness.   Musculoskeletal:         General: No swelling. Normal range of motion.      Cervical back: Normal range of motion.   Skin:     General: Skin is warm and dry.   Neurological:      General: No focal deficit present.      Mental Status: She is alert and oriented to person, place, and time.   Psychiatric:         Mood and Affect: Mood normal.         Behavior: Behavior normal.         Thought Content: Thought content normal.             Santi Nick MD  Family Medicine Physician   [facility] [place]

## 2024-02-22 NOTE — TELEPHONE ENCOUNTER
Patient was left a detailed message she needs to schedule a physical as she has not had one since 2022.

## 2024-02-22 NOTE — ASSESSMENT & PLAN NOTE
Currently being treated, follows GI.  Refill for rifabutin and pantoprazole requested.  Has amoxicillin antibiotic  Discuss further at next appointment.

## 2024-02-22 NOTE — TELEPHONE ENCOUNTER
The patient called she needs to have a student record health form filled out  by a physician she needs  a copy of her immunization record  and lab results  she also needs to have a tb test done  and a chest x ray she lives [place] hour away   does she need an appointment?  Is it possible to have the records and slips e mailed to her for the tb test and chest x ray?   Please call her thank you

## 2024-02-22 NOTE — PATIENT INSTRUCTIONS
Together as a team our goal is to practice preventative care, avoid chronic diseases, and/or avoid further progression of current chronic diseases.   Here are my recommendations as we discussed during our office visit:    Exercise:  150 minutes of moderate intensity workout for overall general health  200-300 minutes of moderate intensity workout for weight loss.   The first 30 minutes of exercising, the body will take energy from what we ate that day. Then after that 30-minute hazel, the body will take energy from the stored fats.  Therefore, it will be more beneficial to do longer sessions and less frequently in a week to help with our weight loss journey.  I would recommend 60-minute sessions 4 times a week   Strength training 2 times a week for good bone health    Nutritional intake (diet):  Remember, it is not about finding a diet to lose weight quickly, rather adjusting your lifestyle for healthy living long-term.   When we build good habits, it does not become difficult to maintain it.  Focus on a low-carb diet.  The best diet is Mediterranean diet, which is a low-carb diet.  There are good carbs and bad carbs, minimize the bad carbs.  Bad carbs: Refined carbohydrates or simple carbohydrates that have been processed and stripped of their natural fiber and nutrients.    These carbohydrates can lead to rapid spikes in blood sugar levels and are often associated with low nutritional value. The big 4: Bread, rice, pasta, potatoes  Refined grains-white bread, white rice, pasta made from refined flour.  Sugary foods and beverages: Candy, pastries, sugary cereals, soda, and other sugary drinks.  Processed snacks: Chips, cookies, sugary granola bars  Sweetened breakfast cereals: Cereals with added sugars.  Sweets and desserts: Cakes, ice cream, pies  Good carbs: These are referred to complex carbohydrates that are unprocessed or minimally processed, providing more nutrients.  Here examples of good carbs:  Whole  grains: Brown rice, quinoa, oats, whole-wheat products   Legumes: Lentils, chickpeas, black beans, kidney beans  Starchy vegetables: Sweet potatoes, butternut squash  Whole fruits: Berries, apples, oranges, bananas  Vegetables: Broccoli, spinach, kale, [place] sprouts  Nuts and seeds: Almonds, walnuts, [place] seeds, flaxseeds.  Cooking oil  Avoid the following oil for cooking: Canola, corn, vegetable, sunflower, peanut, sesame, grapeseed, flaxseed.  Even though it states it is heart healthy, however, they are significantly processed and refined.   Would recommend instead the following cooking oil: Olive oil, coconut oil, avocado oil, ghee, butter.  Focus on eating whole foods.  What are whole foods?  Whole Foods refer to natural, unprocessed, or minimally processed foods that are as close to the original form as possible.  These foods are in their natural state and have undergone little to no refined or alteration.  Whole Foods are valued for their nutrient density, providing essential vitamins, minerals, fiber, and other beneficial compounds.  Examples of whole foods include:  Fruits and vegetables without added preservatives or processing.    Whole grains-unrefined grains like brown rice, quinoa, and whole-wheat, which retain their brain, germ, and endosperm.  Legumes-beans, lentils, and peas in their national unprocessed date.  Nuts and seeds-on roasted, unsalted nuts and seeds without added oils or seasonings.  Meat and fish-fresh, unprocessed meats and fish without additives or preservatives.  Dairy-unprocessed dairy products such as milk, yogurt, and cheese without added sugars or artificial ingredients.  Eggs-eggs in their natural form without additives.    Intermittent fasting:   There are several benefits of intermittent fasting including weight loss, improving insulin sensitivity, improving cardiovascular health by reducing risk factors like blood pressure, cholesterol levels, and triglycerides. It also  promotes brain health, longevity, reduction in inflammatory markers, improves metabolic health, and some studies even suggest intermittent fasting to be protective against certain types of cancers.     The goal of intermittent fasting is to shutdown the insulin hormone, as we discussed, which is a hormone that negatively affects our health when it is around in high levels chronically.     Start intermittent fasting for 14 hours initially, then increase to 16 hours, with a goal to reach 18 hours.  During fasting - may drink water without any additives such as sweeteners, black coffee, tea without any additives including milk.   Eat nutritious meals 2 times a day  Avoid snacking in between meals.  If you end up snacking, snack on fruits/vegetables.  Initially it might be difficult, however, over time you will notice a positive change in your energy, mood, and weight.

## 2024-02-22 NOTE — ASSESSMENT & PLAN NOTE
Health maintenance completed today  - Medical history reviewed, including existing medical conditions, medications, and surgeries.   - Labs discussed to evaluate cholesterol, blood sugar, kidney function, liver function, and other important markers of health.  - BMI evaluated and discussed.  - Lifestyle and health counseling completed including diet, exercise habits, smoking status, alcohol consumption.   - Bone & Heart health reviewed  - Cancer screenings discussed: Mammogram/Pap smear/CT lung/colonoscopy.   - Vaccination status reviewed and pertinent immunizations and booster shots discussed.  - Skin examination: Discussed importance of sunscreen and other preventative measures for skin cancer.  - Mental health and wellbeing evaluated and discussed.  - Family history obtained to identify any of hereditary health risks.    Last screenings completed:  Pap smear  05/17/2017     Plan   Lab orders in place, f/u results.   Start/continue preventative measures as discussed/advised  Complete preventative orders in place as recommended.   Return in 1-2 weeks after completion of labs.

## 2024-02-22 NOTE — ASSESSMENT & PLAN NOTE
Previously increased triglycerides.  Follow-up with blood work, return in 1 to 2-week after completion of labs.

## 2024-02-22 NOTE — ASSESSMENT & PLAN NOTE
Previously on Xanax for anxiety prescribed by previous PCP.  Follow-up with blood work  Return in 1 to 2 weeks after completion of labs.

## 2024-02-24 ENCOUNTER — LAB (OUTPATIENT)
Age: 29
End: 2024-02-24
Payer: COMMERCIAL

## 2024-02-24 DIAGNOSIS — E55.9 INADEQUATE VITAMIN D AND VITAMIN D DERIVATIVE INTAKE: ICD-10-CM

## 2024-02-24 DIAGNOSIS — Z11.3 SCREENING EXAMINATION FOR SEXUALLY TRANSMITTED DISEASE: ICD-10-CM

## 2024-02-24 DIAGNOSIS — Z00.00 HEALTHCARE MAINTENANCE: ICD-10-CM

## 2024-02-24 DIAGNOSIS — F41.9 ANXIETY: ICD-10-CM

## 2024-02-24 DIAGNOSIS — E55.9 VITAMIN D INSUFFICIENCY: ICD-10-CM

## 2024-02-24 DIAGNOSIS — E78.1 HYPERTRIGLYCERIDEMIA: ICD-10-CM

## 2024-02-24 DIAGNOSIS — Z01.84 IMMUNITY STATUS TESTING: ICD-10-CM

## 2024-02-24 DIAGNOSIS — R73.9 ELEVATED BLOOD SUGAR: ICD-10-CM

## 2024-02-24 DIAGNOSIS — R53.83 OTHER FATIGUE: ICD-10-CM

## 2024-02-24 DIAGNOSIS — F41.0 PANIC ATTACK: ICD-10-CM

## 2024-02-24 DIAGNOSIS — Z11.1 TUBERCULOSIS SCREENING: ICD-10-CM

## 2024-02-24 LAB
25(OH)D3 SERPL-MCNC: 13.9 NG/ML (ref 30–100)
ALBUMIN SERPL BCP-MCNC: 4.1 G/DL (ref 3.5–5)
ALP SERPL-CCNC: 55 U/L (ref 34–104)
ALT SERPL W P-5'-P-CCNC: 8 U/L (ref 7–52)
ANION GAP SERPL CALCULATED.3IONS-SCNC: 7 MMOL/L
AST SERPL W P-5'-P-CCNC: 15 U/L (ref 13–39)
BASOPHILS # BLD AUTO: 0.03 THOUSANDS/ÂΜL (ref 0–0.1)
BASOPHILS NFR BLD AUTO: 1 % (ref 0–1)
BILIRUB SERPL-MCNC: 0.44 MG/DL (ref 0.2–1)
BUN SERPL-MCNC: 8 MG/DL (ref 5–25)
CALCIUM SERPL-MCNC: 9 MG/DL (ref 8.4–10.2)
CHLORIDE SERPL-SCNC: 105 MMOL/L (ref 96–108)
CHOLEST SERPL-MCNC: 185 MG/DL
CO2 SERPL-SCNC: 26 MMOL/L (ref 21–32)
CREAT SERPL-MCNC: 0.57 MG/DL (ref 0.6–1.3)
EOSINOPHIL # BLD AUTO: 0.16 THOUSAND/ÂΜL (ref 0–0.61)
EOSINOPHIL NFR BLD AUTO: 4 % (ref 0–6)
ERYTHROCYTE [DISTWIDTH] IN BLOOD BY AUTOMATED COUNT: 14.1 % (ref 11.6–15.1)
EST. AVERAGE GLUCOSE BLD GHB EST-MCNC: 120 MG/DL
GFR SERPL CREATININE-BSD FRML MDRD: 125 ML/MIN/1.73SQ M
GLUCOSE P FAST SERPL-MCNC: 81 MG/DL (ref 65–99)
HBA1C MFR BLD: 5.8 %
HCT VFR BLD AUTO: 35.3 % (ref 34.8–46.1)
HDLC SERPL-MCNC: 50 MG/DL
HGB BLD-MCNC: 10.5 G/DL (ref 11.5–15.4)
IMM GRANULOCYTES # BLD AUTO: 0.01 THOUSAND/UL (ref 0–0.2)
IMM GRANULOCYTES NFR BLD AUTO: 0 % (ref 0–2)
LDLC SERPL CALC-MCNC: 125 MG/DL (ref 0–100)
LYMPHOCYTES # BLD AUTO: 1.86 THOUSANDS/ÂΜL (ref 0.6–4.47)
LYMPHOCYTES NFR BLD AUTO: 42 % (ref 14–44)
MCH RBC QN AUTO: 24.1 PG (ref 26.8–34.3)
MCHC RBC AUTO-ENTMCNC: 29.7 G/DL (ref 31.4–37.4)
MCV RBC AUTO: 81 FL (ref 82–98)
MONOCYTES # BLD AUTO: 0.53 THOUSAND/ÂΜL (ref 0.17–1.22)
MONOCYTES NFR BLD AUTO: 12 % (ref 4–12)
NEUTROPHILS # BLD AUTO: 1.88 THOUSANDS/ÂΜL (ref 1.85–7.62)
NEUTS SEG NFR BLD AUTO: 41 % (ref 43–75)
NRBC BLD AUTO-RTO: 0 /100 WBCS
PLATELET # BLD AUTO: 322 THOUSANDS/UL (ref 149–390)
PMV BLD AUTO: 11.4 FL (ref 8.9–12.7)
POTASSIUM SERPL-SCNC: 4.5 MMOL/L (ref 3.5–5.3)
PROT SERPL-MCNC: 6.9 G/DL (ref 6.4–8.4)
RBC # BLD AUTO: 4.36 MILLION/UL (ref 3.81–5.12)
SODIUM SERPL-SCNC: 138 MMOL/L (ref 135–147)
TREPONEMA PALLIDUM IGG+IGM AB [PRESENCE] IN SERUM OR PLASMA BY IMMUNOASSAY: NORMAL
TRIGL SERPL-MCNC: 48 MG/DL
TSH SERPL DL<=0.05 MIU/L-ACNC: 1.25 UIU/ML (ref 0.45–4.5)
WBC # BLD AUTO: 4.47 THOUSAND/UL (ref 4.31–10.16)

## 2024-02-24 PROCEDURE — 85025 COMPLETE CBC W/AUTO DIFF WBC: CPT

## 2024-02-24 PROCEDURE — 86704 HEP B CORE ANTIBODY TOTAL: CPT

## 2024-02-24 PROCEDURE — 83036 HEMOGLOBIN GLYCOSYLATED A1C: CPT

## 2024-02-24 PROCEDURE — 86780 TREPONEMA PALLIDUM: CPT

## 2024-02-24 PROCEDURE — 84443 ASSAY THYROID STIM HORMONE: CPT

## 2024-02-24 PROCEDURE — 86480 TB TEST CELL IMMUN MEASURE: CPT

## 2024-02-24 PROCEDURE — 80061 LIPID PANEL: CPT

## 2024-02-24 PROCEDURE — 82306 VITAMIN D 25 HYDROXY: CPT

## 2024-02-24 PROCEDURE — 86706 HEP B SURFACE ANTIBODY: CPT

## 2024-02-24 PROCEDURE — 36415 COLL VENOUS BLD VENIPUNCTURE: CPT

## 2024-02-24 PROCEDURE — 80053 COMPREHEN METABOLIC PANEL: CPT

## 2024-02-25 LAB
HBV CORE AB SER QL: NORMAL
HBV SURFACE AB SER-ACNC: 29.7 MIU/ML

## 2024-02-26 LAB
GAMMA INTERFERON BACKGROUND BLD IA-ACNC: 0.07 IU/ML
M TB IFN-G BLD-IMP: NEGATIVE
M TB IFN-G CD4+ BCKGRND COR BLD-ACNC: 0.05 IU/ML
M TB IFN-G CD4+ BCKGRND COR BLD-ACNC: 0.07 IU/ML
MITOGEN IGNF BCKGRD COR BLD-ACNC: 9.93 IU/ML

## 2024-02-29 ENCOUNTER — OFFICE VISIT (OUTPATIENT)
Age: 29
End: 2024-02-29
Payer: COMMERCIAL

## 2024-02-29 VITALS
TEMPERATURE: 99.1 F | SYSTOLIC BLOOD PRESSURE: 130 MMHG | BODY MASS INDEX: 36.25 KG/M2 | OXYGEN SATURATION: 100 % | WEIGHT: 192 LBS | RESPIRATION RATE: 14 BRPM | DIASTOLIC BLOOD PRESSURE: 80 MMHG | HEIGHT: 61 IN | HEART RATE: 84 BPM

## 2024-02-29 DIAGNOSIS — D50.0 IRON DEFICIENCY ANEMIA SECONDARY TO BLOOD LOSS (CHRONIC): ICD-10-CM

## 2024-02-29 DIAGNOSIS — E78.2 MIXED HYPERLIPIDEMIA: ICD-10-CM

## 2024-02-29 DIAGNOSIS — R73.03 PREDIABETES: Primary | ICD-10-CM

## 2024-02-29 DIAGNOSIS — E55.9 VITAMIN D DEFICIENCY: ICD-10-CM

## 2024-02-29 DIAGNOSIS — A04.8 H. PYLORI INFECTION: ICD-10-CM

## 2024-02-29 PROCEDURE — 99214 OFFICE O/P EST MOD 30 MIN: CPT | Performed by: FAMILY MEDICINE

## 2024-02-29 RX ORDER — PANTOPRAZOLE SODIUM 40 MG/1
40 TABLET, DELAYED RELEASE ORAL 2 TIMES DAILY
Qty: 28 TABLET | Refills: 0 | Status: CANCELLED | OUTPATIENT
Start: 2024-02-29 | End: 2024-03-14

## 2024-02-29 RX ORDER — RIFABUTIN 150 MG/1
300 CAPSULE ORAL DAILY
Qty: 28 CAPSULE | Refills: 0 | Status: CANCELLED | OUTPATIENT
Start: 2024-02-29 | End: 2024-03-14

## 2024-02-29 RX ORDER — CHOLECALCIFEROL (VITAMIN D3) 1250 MCG
CAPSULE ORAL
Qty: 24 CAPSULE | Refills: 0 | Status: SHIPPED | OUTPATIENT
Start: 2024-02-29

## 2024-02-29 NOTE — ASSESSMENT & PLAN NOTE
Reviewed & Discussed labs  Deficient vitamin D levels  Goal , preventative therapy     Start vitamin D3 50,000 units two times a week for 12 weeks  Follow-up with vitamin D level after completion of 12-week course   After weekly prescription dose, start vitamin D3 5,000 units daily supplements from over-the-counter   Advised vitamin K2 100-200 mcg daily, preventative measures for vascular calcification.

## 2024-02-29 NOTE — ASSESSMENT & PLAN NOTE
Lab Results   Component Value Date    CHOLESTEROL 185 02/24/2024    CHOLESTEROL 175 02/02/2023    CHOLESTEROL 179 05/10/2021     Lab Results   Component Value Date    HDL 50 02/24/2024    HDL 50 02/02/2023    HDL 47 05/10/2021     Lab Results   Component Value Date    TRIG 48 02/24/2024    TRIG 157 (H) 02/02/2023    TRIG 68 05/10/2021     Lab Results   Component Value Date    LDLCALC 125 (H) 02/24/2024       Currently not on statin.   Reviewed lipid panel   Triglycerides/HDL ratio: 0.96. Goal <2, 02/24/2024   Discussed importance of nutritional intake, exercising regularly.    Decrease calorie intake as discussed  Recommend low-carb diet  Exercise regularly as discussed  In addition to diet supplement omega-3 with fish oil as discussed.  Monitor lipid panel in 6-12 months.

## 2024-02-29 NOTE — PATIENT INSTRUCTIONS
Recommendations for bone and heart health  Take vitamin D3 50,000 units twice a week for 12 weeks, prescription sent to pharmacy   Follow-up with vitamin D blood work after completing the weekly prescription course.  Once completed with the weekly course begin vitamin D3 5,000 units daily with food   Start Vitamin K2 100-200 mcg daily with food    Start fish oil with omega-3.       Why is vitamin K2 important?  Vitamin K 2 helps ensure that the calcium is directed to the bones for proper mineralization, contributing to bone strength and density.  Vitamin K2 also helps to activate proteins that prevent calcium from depositing in the arterial walls and reducing the risk of arterial calcification. Let's get your calcium where it belongs, into the bones and out of the vessels!!     Why is Vitamin D important?  Adequate vitamin D levels reduces the risk of fractures and osteoporosis.  Vitamin D is involved in modulating the immune system, enhancing the body's defense against infections and supporting overall immune function.  Adequate vitamin D levels are associated with better muscle strength and function.  Deficiency may contribute to muscle weakness and an increase risk of falls in older adults.  Multiple research studies have associated low vitamin D with increased risk of autoimmune disease, cancers including breast cancer, and multiple sclerosis.  Some research also link vitamin D deficiency to increase risk of cardiovascular disease. Vitamin D also plays a role in synthesis of various hormones, including insulin.  It may play a role in insulin sensitivity and glucose metabolism. Adequate levels of vitamin D may also plays a role in supporting mental wellbeing.  In conclusion, lets bring your vitamin D levels up to >65 to prevent many diseases and conditions!     Why is fish oil with omega-3 important?  Omega-3 fatty acids and fish oil or associated with reduced risk of heart disease.  They help to improve balance  of cholesterol by increasing HDL (good cholesterol) and reducing triglycerides. They also reduce blood pressure and have anti-inflammatory effects contributing to overall cardiovascular health.  Omega-threes have anti-inflammatory properties that may benefit individuals with joint conditions such as rheumatoid arthritis, and other chronic inflammatory conditions such as IBD.  They can help reduce inflammation and alleviate symptoms. Let's prevent diseases, avoid the need of medications and start your healthy journey with these supplements (in addition to improving your nutritional intake and regular exercise)!          Together as a team our goal is to practice preventative care, avoid chronic diseases, and/or avoid further progression of current chronic diseases.   Here are my recommendations as we discussed during our office visit:    Exercise:  150 minutes of moderate intensity workout for overall general health  200-300 minutes of moderate intensity workout for weight loss.   The first 30 minutes of exercising, the body will take energy from what we ate that day. Then after that 30-minute hazel, the body will take energy from the stored fats.  Therefore, it will be more beneficial to do longer sessions and less frequently in a week to help with our weight loss journey.  I would recommend 60-minute sessions 4 times a week   Strength training 2 times a week for good bone health    Nutritional intake (diet):  Remember, it is not about finding a diet to lose weight quickly, rather adjusting your lifestyle for healthy living long-term.   When we build good habits, it does not become difficult to maintain it.  Focus on a low-carb diet.  The best diet is Mediterranean diet, which is a low-carb diet.  There are good carbs and bad carbs, minimize the bad carbs.  Bad carbs: Refined carbohydrates or simple carbohydrates that have been processed and stripped of their natural fiber and nutrients.    These carbohydrates can  lead to rapid spikes in blood sugar levels and are often associated with low nutritional value. The big 4: Bread, rice, pasta, potatoes  Refined grains-white bread, white rice, pasta made from refined flour.  Sugary foods and beverages: Candy, pastries, sugary cereals, soda, and other sugary drinks.  Processed snacks: Chips, cookies, sugary granola bars  Sweetened breakfast cereals: Cereals with added sugars.  Sweets and desserts: Cakes, ice cream, pies  Good carbs: These are referred to complex carbohydrates that are unprocessed or minimally processed, providing more nutrients.  Here examples of good carbs:  Whole grains: Brown rice, quinoa, oats, whole-wheat products   Legumes: Lentils, chickpeas, black beans, kidney beans  Starchy vegetables: Sweet potatoes, butternut squash  Whole fruits: Berries, apples, oranges, bananas  Vegetables: Broccoli, spinach, kale, [place] sprouts  Nuts and seeds: Almonds, walnuts, [place] seeds, flaxseeds.  Cooking oil  Avoid the following oil for cooking: Canola, corn, vegetable, sunflower, peanut, sesame, grapeseed, flaxseed.  Even though it states it is heart healthy, however, they are significantly processed and refined.   Would recommend instead the following cooking oil: Olive oil, coconut oil, avocado oil, ghee, butter.  Focus on eating whole foods.  What are whole foods?  Whole Foods refer to natural, unprocessed, or minimally processed foods that are as close to the original form as possible.  These foods are in their natural state and have undergone little to no refined or alteration.  Whole Foods are valued for their nutrient density, providing essential vitamins, minerals, fiber, and other beneficial compounds.  Examples of whole foods include:  Fruits and vegetables without added preservatives or processing.    Whole grains-unrefined grains like brown rice, quinoa, and whole-wheat, which retain their brain, germ, and endosperm.  Legumes-beans, lentils, and peas in their  national unprocessed date.  Nuts and seeds-on roasted, unsalted nuts and seeds without added oils or seasonings.  Meat and fish-fresh, unprocessed meats and fish without additives or preservatives.  Dairy-unprocessed dairy products such as milk, yogurt, and cheese without added sugars or artificial ingredients.  Eggs-eggs in their natural form without additives.    Intermittent fasting:   There are several benefits of intermittent fasting including weight loss, improving insulin sensitivity, improving cardiovascular health by reducing risk factors like blood pressure, cholesterol levels, and triglycerides. It also promotes brain health, longevity, reduction in inflammatory markers, improves metabolic health, and some studies even suggest intermittent fasting to be protective against certain types of cancers.     The goal of intermittent fasting is to shutdown the insulin hormone, as we discussed, which is a hormone that negatively affects our health when it is around in high levels chronically.     Start intermittent fasting for 14 hours initially, then increase to 16 hours, with a goal to reach 18 hours.  During fasting - may drink water without any additives such as sweeteners, black coffee, tea without any additives including milk.   Eat nutritious meals 2 times a day  Avoid snacking in between meals.  If you end up snacking, snack on fruits/vegetables.  Initially it might be difficult, however, over time you will notice a positive change in your energy, mood, and weight.

## 2024-02-29 NOTE — ASSESSMENT & PLAN NOTE
Discussed recent labs including prediabetic range A1c.   Discussed the importance of making dietary and lifestyle changes to avoid progression and the need of multiple antidiabetic meds    Recommend low-carb diet, limiting snacking, intermittent fasting as discussed  Recommend exercising 200-300 minutes/week, 60 minutes sessions x 4 days  Will monitor A1c in 3-6 months increments  Follow-up with labs as discussed  Return in 6 months

## 2024-02-29 NOTE — PROGRESS NOTES
[facility] PRIMARY [facility]  [address] [place]    Name: Katherin Iverson      YOB: 1995      MRN: [id number]  Encounter Provider: Santi Nick MD      Encounter Date: 02/29/24      Encounter Dept: [facility]    Assessment & Plan     1. Prediabetes  Assessment & Plan:  Discussed recent labs including prediabetic range A1c.   Discussed the importance of making dietary and lifestyle changes to avoid progression and the need of multiple antidiabetic meds    Recommend low-carb diet, limiting snacking, intermittent fasting as discussed  Recommend exercising 200-300 minutes/week, 60 minutes sessions x 4 days  Will monitor A1c in 3-6 months increments  Follow-up with labs as discussed  Return in 6 months      Orders:  -     Hemoglobin A1C; Future; Expected date: 05/29/2024    2. Vitamin D deficiency  Assessment & Plan:  Reviewed & Discussed labs  Deficient vitamin D levels  Goal , preventative therapy     Start vitamin D3 50,000 units two times a week for 12 weeks  Follow-up with vitamin D level after completion of 12-week course   After weekly prescription dose, start vitamin D3 5,000 units daily supplements from over-the-counter   Advised vitamin K2 100-200 mcg daily, preventative measures for vascular calcification.     Orders:  -     Cholecalciferol (Vitamin D3) 1.25 MG (25135 UT) capsule; 2 times a week for 12 weeks.  -     Vitamin D 25 hydroxy; Future; Expected date: 05/29/2024    3. Mixed hyperlipidemia  Assessment & Plan:  Lab Results   Component Value Date    CHOLESTEROL 185 02/24/2024    CHOLESTEROL 175 02/02/2023    CHOLESTEROL 179 05/10/2021     Lab Results   Component Value Date    HDL 50 02/24/2024    HDL 50 02/02/2023    HDL 47 05/10/2021     Lab Results   Component Value Date    TRIG 48 02/24/2024    TRIG 157 (H) 02/02/2023    TRIG 68 05/10/2021     Lab Results   Component Value Date    LDLCALC  125 (H) 02/24/2024       Currently not on statin.   Reviewed lipid panel   Triglycerides/HDL ratio: 0.96. Goal <2, 02/24/2024   Discussed importance of nutritional intake, exercising regularly.    Decrease calorie intake as discussed  Recommend low-carb diet  Exercise regularly as discussed  In addition to diet supplement omega-3 with fish oil as discussed.  Monitor lipid panel in 6-12 months.        4. Iron deficiency anemia secondary to blood loss (chronic)  Assessment & Plan:  History of H. pylori, currently following GI  Follow-up with GI with history of H. Pylori  Follow-up with CBC in 1-2 months.  Holding off on iron supplements due to setting of H. pylori infection process.  Patient has an upcoming appointment with gynecology to address menorrhagia.    Orders:  -     CBC and differential; Future; Expected date: 03/29/2024  -     Iron Panel (Includes Ferritin, Iron Sat%, Iron, and TIBC); Future    5. H. pylori infection          Depression Screening and Follow-up Plan: Patient was screened for depression during today's encounter. They screened negative with a PHQ-2 score of 0.        I have spent a total time of >60 minutes on 02/29/24 in caring for this patient including Diagnostic results, Prognosis, Risks and benefits of tx options, Instructions for management, Patient and family education, Importance of tx compliance, Risk factor reductions, Impressions, Counseling / Coordination of care, Documenting in the medical record, Reviewing / ordering tests, medicine, procedures  , and Obtaining or reviewing history  .        Follow-Up Plans   Return in about 6 months (around 8/29/2024).      Cj Iverson is a 28 y.o. with  has a past medical history of Anemia (10/6/2017), Anemia complicating pregnancy (10/06/2017), Anxiety, Asthma, Cerebral concussion (03/17/2017), COVID-19, Depression, GERD (gastroesophageal reflux disease), Group B Streptococcus carrier, antepartum (12/01/2017), Head injury,  "Herpes, Migraine, Peptic ulceration, Postpartum depression, Secondary amenorrhea (02/24/2020), and Varicella.      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          Review of Systems      Current Medication List     Current Outpatient Medications:     Cholecalciferol (Vitamin D3) 1.25 MG (46110 UT) capsule, 2 times a week for 12 weeks., Disp: 24 capsule, Rfl: 0    pantoprazole (PROTONIX) 40 mg tablet, Take 1 tablet (40 mg total) by mouth 2 (two) times a day for 14 days, Disp: 28 tablet, Rfl: 0    rifabutin (MYCOBUTIN) 150 mg capsule, Take 2 capsules (300 mg total) by mouth daily for 14 days, Disp: 28 capsule, Rfl: 0      Objective     /80 (BP Location: Left arm, Patient Position: Sitting, Cuff Size: Standard)   Pulse 84   Temp 99.1 °F (37.3 °C) (Tympanic)   Resp 14   Ht 5' 1\" (1.549 m)   Wt 87.1 kg (192 lb)   LMP 08/04/2023 (Exact Date)   SpO2 100%   BMI 36.28 kg/m²      Physical Exam  Vitals reviewed.   Constitutional:       General: She is not in acute distress.     Appearance: Normal appearance. She is not ill-appearing, toxic-appearing or diaphoretic.   HENT:      Head: Normocephalic and atraumatic.      Right Ear: External ear normal.      Left Ear: External ear normal.      Nose: Nose normal.      Mouth/Throat:      Mouth: Mucous membranes are moist.   Eyes:      General: No scleral icterus.        Right eye: No discharge.         Left eye: No discharge.      Conjunctiva/sclera: Conjunctivae normal.   Cardiovascular:      Rate and Rhythm: Normal rate.   Pulmonary:      Effort: Pulmonary effort is normal. No respiratory distress.   Skin:     General: Skin is warm.   Neurological:      General: No focal deficit present.      Mental Status: She is alert and oriented to person, place, and time.   Psychiatric:         Mood and Affect: Mood normal.         Behavior: Behavior normal.         Thought Content: Thought content normal.           Santi Nick MD  Family Medicine Physician    " [facility]

## 2024-02-29 NOTE — ASSESSMENT & PLAN NOTE
History of H. pylori, currently following GI  Follow-up with GI with history of H. Pylori  Follow-up with CBC in 1-2 months.  Holding off on iron supplements due to setting of H. pylori infection process.  Patient has an upcoming appointment with gynecology to address menorrhagia.

## 2024-04-22 PROBLEM — Z00.00 HEALTHCARE MAINTENANCE: Status: RESOLVED | Noted: 2024-02-22 | Resolved: 2024-04-22

## 2024-06-21 ENCOUNTER — TELEPHONE (OUTPATIENT)
Dept: PSYCHIATRY | Facility: CLINIC | Age: 29
End: 2024-06-21

## 2024-06-21 NOTE — TELEPHONE ENCOUNTER
Due to no response to MyChart wait list letter, patient will be removed from psychiatric talk therapy wait list.

## 2024-07-08 ENCOUNTER — VBI (OUTPATIENT)
Dept: ADMINISTRATIVE | Facility: OTHER | Age: 29
End: 2024-07-08

## 2024-07-08 NOTE — TELEPHONE ENCOUNTER
07/08/24 9:13 AM     Chart reviewed for Pap Smear (HPV) aka Cervical Cancer Screening ; nothing is submitted to the patient's insurance at this time.     Yovana Esqueda   [facility]

## 2024-07-16 NOTE — TELEPHONE ENCOUNTER
My note says I ordered    Sent again well developed, well nourished , in no acute distress , ambulating without difficulty , normal communication ability

## 2025-01-02 NOTE — LETTER
March 3, 2020     Khari Carney MD  [address] [address] [facility]  [place] [facility] [facility] [address]    Patient: Pura Snider   YOB: 1995   Date of Visit: 3/3/2020       Dear Dr Gordo Heredia:    Thank you for referring Pura Snider to me for evaluation  Below are my notes for this consultation  If you have questions, please do not hesitate to call me  I look forward to following your patient along with you  Sincerely,        Tapan Bryant MD        CC: No Recipients  Tapan Bryant MD  3/3/2020  3:54 PM  Incomplete  [facility]    Dear Dr Gordo Heredia     I had the pleasure of seeing your patient Pura Snider in the office today and I thank you for this kind referral        Chief Complaint:  Abdominal pain      HPI:  Pura Snider is a 21 y o  female who presents with abdominal pain  According to the patient she has a history of this going back to the age of 11  At that time she developed severe epigastric and left upper quadrant pain  She was seen in an emergency room in told she probably had an ulcer although she does not recall any testing being done  She was placed on Prilosec and that seemed to help  The patient has suffered from constipation since childhood  Last December she began again having a left upper quadrant and midepigastric nonradiating pain  However the patient had been drinking a lot and also taking a lot of Excedrin for headaches  She noticed that the pain was so bad she we get palpitations from it  She went to the hospital and underwent a CT scan which showed a possible enteritis  She was placed on antibiotics but these were stopped  More recently the patient had further episode with epigastric and left upper quadrant pain  There were no nocturnal symptoms  She notes that she still takes Excedrin  She has not had any melena or hematochezia  No nausea or vomiting  No early satiety  No other definitive exacerbating or remitting factors    No relation to any particular type of food  No exertional symptomatology  Lipase recently done was 57  Albumin was 3 1  She has a chronic mild anemia with last hemoglobin of 9 9 and hematocrit of 34 3  Ultrasound was unremarkable  There is no other contributory history  Patient underwent a recent surgical          Review of Systems:   Constitutional: No fever or chills, feels well, no tiredness, no recent weight gain or weight loss  HENT: No complaints of earache, no hearing loss, no nosebleeds, no nasal discharge, no sore throat, no hoarseness  Eyes: No complaints of eye pain, no red eyes, no discharge from eyes, no itchy eyes  Cardiovascular: No complaints of slow heart rate, no fast heart rate, no chest pain, no palpitations, no leg claudication, no lower extremity edema  Respiratory: No complaints of shortness of breath, no wheezing, no cough, no SOB on exertion, no orthopnea  Gastrointestinal: As noted in HPI  Genitourinary: No complaints of dysuria, no incontinence, no hesitancy, no nocturia  Musculoskeletal: No complaints of arthralgia, no myalgias, no joint swelling or stiffness, no limb pain or swelling  Neurological: No complaints of headache, no confusion, no convulsions, no numbness or tingling, no dizziness or fainting, no limb weakness, no difficulty walking  Skin: No complaints of skin rash or skin lesions, no itching, no skin wound, no dry skin  Hematological/Lymphatic: No complaints of swollen glands, does not bleed easy  Allergic/Immunologic: No immunocompromised state  Endocrine:  No complaints of polyuria, no polydipsia  Psychiatric/Behavioral: is not suicidal, no sleep disturbances, no anxiety or depression, no change in personality, no emotional problems         Historical Information   Past Medical History:   Diagnosis Date    Anemia complicating pregnancy     Anxiety     Asthma     Cerebral concussion 3/17/2017    Group B Streptococcus carrier, antepartum 2017    Head injury     Herpes     Migraine     Postpartum depression     Secondary amenorrhea 2020    Varicella     vaccine     Past Surgical History:   Procedure Laterality Date    APPENDECTOMY      INDUCED   2012 2020    X 2    NH  DELIVERY ONLY N/A 2017    Procedure:  SECTION (); Surgeon: Dominique Schmid DO;  Location: [facility];  Service: Obstetrics     Social History   Social History     Substance and Sexual Activity   Alcohol Use No     Social History     Substance and Sexual Activity   Drug Use No     Social History     Tobacco Use   Smoking Status Former Smoker    Last attempt to quit:     Years since quittin 1   Smokeless Tobacco Never Used   Tobacco Comment    Never a smoker per [employer]      Family History   Problem Relation Age of Onset    Asthma Mother     Depression Mother    Roanna Kugel Migraines Mother     Diabetes type II Mother     Other Mother         vertigo    Diabetes Maternal Aunt     Hypertension Maternal Aunt     Diabetes Maternal Grandmother     Hypertension Maternal Grandmother     Breast cancer Maternal Grandmother     Migraines Father     Heart murmur Brother     Lung cancer Paternal Grandmother     Stroke Paternal Grandmother          Current Medications: has a current medication list which includes the following prescription(s): clindamycin  Vital Signs: /60   Pulse 87   Wt 75 8 kg (167 lb)   LMP 2019 (LMP Unknown)   BMI 30 54 kg/m²      Physical Exam:   Constitutional  General Appearance: No acute distress, well appearing and well nourished  Head  Normocephalic  Eyes  Conjunctivae and lids: No swelling, erythema, or discharge  Pupils and irises: Equal, round and reactive to light  Ears, Nose, Mouth, and Throat  External inspection of ears and nose: Normal  Nasal mucosa, septum and turbinates: Normal without edema or erythema/   Oropharynx: Normal with no erythema, edema, exudate or lesions  Neck  Normal range of motion  Neck supple  Cardiovascular  Auscultation of the heart: Normal rate and rhythm, normal S1 and S2 without murmurs  Examination of the extremities for edema and/or varicosities: Normal  Pulmonary/Chest  Respiratory effort: No increased work of breathing or signs of respiratory distress  Auscultation of lungs: Clear to auscultation, equal breath sounds bilaterally, no wheezes, rales, no rhonchi  Abdomen  Abdomen: Non-tender, no masses  Liver and spleen: No hepatomegaly or splenomegaly  Musculoskeletal  Gait and station: normal   Digits and Nails: normal without clubbing or cyanosis  Inspection/palpation of joints, bones, and muscles: Normal  Neurological  No nystagmus or asterixis  Skin  Skin and subcutaneous tissue: Normal without rashes or lesions  Lymphatic  Palpation of the lymph nodes in neck: No lymphadenopathy  Psychiatric  Orientation to person, place and time: Normal   Mood and affect: Normal          Labs:   Lab Results   Component Value Date    ALT 8 (L) 02/17/2020    AST 19 02/17/2020    BUN 10 02/17/2020    CALCIUM 8 5 02/17/2020     02/17/2020    CO2 24 02/17/2020    CREATININE 0 62 02/17/2020    HCT 34 3 (L) 02/17/2020    HGB 9 9 (L) 02/17/2020     02/17/2020    K 4 0 02/17/2020    WBC 7 43 02/17/2020         X-Rays & Procedures:   No orders to display         ______________________________________________________________________      Assessment & Plan:      Diagnoses and all orders for this visit:    Epigastric pain    Abdominal pain, unspecified abdominal location  -     Ambulatory referral to Gastroenterology    Left upper quadrant pain        I have taken liberty of scheduling the patient for an EGD  This certainly sounds like it might be at least gastritis    Advised that she begin over-the-counter Pepcid 20 mg p o  B i d  Since she had some good response to Prilosec in past   I Will be happy to inform you of her results any further recommendations ,I   Would like to thank you for participate              With warmest regards,    Shahbaz Martinez MD, [facility]

## 2025-01-22 ENCOUNTER — TELEPHONE (OUTPATIENT)
Age: 30
End: 2025-01-22

## 2025-01-22 NOTE — TELEPHONE ENCOUNTER
Appointment; general check up (physical?), - left vm not due for physical until after: 2/22; unless she has New insurance     Left office p# to call back to reschedule or for her to let us know if she got New insurance.     Sent message to her My Chart also

## 2025-03-13 ENCOUNTER — TELEPHONE (OUTPATIENT)
Age: 30
End: 2025-03-13

## 2025-03-13 ENCOUNTER — OFFICE VISIT (OUTPATIENT)
Age: 30
End: 2025-03-13
Payer: COMMERCIAL

## 2025-03-13 ENCOUNTER — APPOINTMENT (OUTPATIENT)
Age: 30
End: 2025-03-13
Payer: COMMERCIAL

## 2025-03-13 VITALS
HEART RATE: 87 BPM | TEMPERATURE: 98.7 F | HEIGHT: 61 IN | WEIGHT: 209.2 LBS | BODY MASS INDEX: 39.5 KG/M2 | SYSTOLIC BLOOD PRESSURE: 110 MMHG | RESPIRATION RATE: 14 BRPM | OXYGEN SATURATION: 100 % | DIASTOLIC BLOOD PRESSURE: 73 MMHG

## 2025-03-13 DIAGNOSIS — F41.0 PANIC ATTACK: ICD-10-CM

## 2025-03-13 DIAGNOSIS — Z13.220 ENCOUNTER FOR LIPID SCREENING FOR CARDIOVASCULAR DISEASE: ICD-10-CM

## 2025-03-13 DIAGNOSIS — E66.812 OBESITY, CLASS II, BMI 35-39.9: ICD-10-CM

## 2025-03-13 DIAGNOSIS — R73.03 PREDIABETES: Primary | Chronic | ICD-10-CM

## 2025-03-13 DIAGNOSIS — E78.2 MIXED HYPERLIPIDEMIA: Chronic | ICD-10-CM

## 2025-03-13 DIAGNOSIS — K42.9 UMBILICAL HERNIA WITHOUT OBSTRUCTION AND WITHOUT GANGRENE: ICD-10-CM

## 2025-03-13 DIAGNOSIS — E55.9 VITAMIN D INSUFFICIENCY: ICD-10-CM

## 2025-03-13 DIAGNOSIS — Z00.00 ANNUAL PHYSICAL EXAM: ICD-10-CM

## 2025-03-13 DIAGNOSIS — F41.9 ANXIETY AND DEPRESSION: ICD-10-CM

## 2025-03-13 DIAGNOSIS — F32.A ANXIETY AND DEPRESSION: ICD-10-CM

## 2025-03-13 DIAGNOSIS — J06.9 VIRAL URI WITH COUGH: ICD-10-CM

## 2025-03-13 DIAGNOSIS — Z13.6 ENCOUNTER FOR LIPID SCREENING FOR CARDIOVASCULAR DISEASE: ICD-10-CM

## 2025-03-13 LAB
25(OH)D3 SERPL-MCNC: 14 NG/ML (ref 30–100)
ALBUMIN SERPL BCG-MCNC: 4.3 G/DL (ref 3.5–5)
ALP SERPL-CCNC: 59 U/L (ref 34–104)
ALT SERPL W P-5'-P-CCNC: 9 U/L (ref 7–52)
ANION GAP SERPL CALCULATED.3IONS-SCNC: 9 MMOL/L (ref 4–13)
AST SERPL W P-5'-P-CCNC: 15 U/L (ref 13–39)
BASOPHILS # BLD AUTO: 0.06 THOUSANDS/ÂΜL (ref 0–0.1)
BASOPHILS NFR BLD AUTO: 1 % (ref 0–1)
BILIRUB SERPL-MCNC: 0.42 MG/DL (ref 0.2–1)
BUN SERPL-MCNC: 7 MG/DL (ref 5–25)
CALCIUM SERPL-MCNC: 9 MG/DL (ref 8.4–10.2)
CHLORIDE SERPL-SCNC: 104 MMOL/L (ref 96–108)
CHOLEST SERPL-MCNC: 161 MG/DL (ref ?–200)
CO2 SERPL-SCNC: 25 MMOL/L (ref 21–32)
CREAT SERPL-MCNC: 0.51 MG/DL (ref 0.6–1.3)
EOSINOPHIL # BLD AUTO: 0.3 THOUSAND/ÂΜL (ref 0–0.61)
EOSINOPHIL NFR BLD AUTO: 5 % (ref 0–6)
ERYTHROCYTE [DISTWIDTH] IN BLOOD BY AUTOMATED COUNT: 15.6 % (ref 11.6–15.1)
EST. AVERAGE GLUCOSE BLD GHB EST-MCNC: 123 MG/DL
GFR SERPL CREATININE-BSD FRML MDRD: 129 ML/MIN/1.73SQ M
GLUCOSE P FAST SERPL-MCNC: 84 MG/DL (ref 65–99)
HBA1C MFR BLD: 5.9 %
HCT VFR BLD AUTO: 34.7 % (ref 34.8–46.1)
HDLC SERPL-MCNC: 42 MG/DL
HGB BLD-MCNC: 10.2 G/DL (ref 11.5–15.4)
IMM GRANULOCYTES # BLD AUTO: 0.01 THOUSAND/UL (ref 0–0.2)
IMM GRANULOCYTES NFR BLD AUTO: 0 % (ref 0–2)
LDLC SERPL CALC-MCNC: 103 MG/DL (ref 0–100)
LYMPHOCYTES # BLD AUTO: 1.99 THOUSANDS/ÂΜL (ref 0.6–4.47)
LYMPHOCYTES NFR BLD AUTO: 30 % (ref 14–44)
MCH RBC QN AUTO: 23.4 PG (ref 26.8–34.3)
MCHC RBC AUTO-ENTMCNC: 29.4 G/DL (ref 31.4–37.4)
MCV RBC AUTO: 80 FL (ref 82–98)
MONOCYTES # BLD AUTO: 0.71 THOUSAND/ÂΜL (ref 0.17–1.22)
MONOCYTES NFR BLD AUTO: 11 % (ref 4–12)
NEUTROPHILS # BLD AUTO: 3.6 THOUSANDS/ÂΜL (ref 1.85–7.62)
NEUTS SEG NFR BLD AUTO: 53 % (ref 43–75)
NRBC BLD AUTO-RTO: 0 /100 WBCS
PLATELET # BLD AUTO: 332 THOUSANDS/UL (ref 149–390)
PMV BLD AUTO: 10.8 FL (ref 8.9–12.7)
POTASSIUM SERPL-SCNC: 3.8 MMOL/L (ref 3.5–5.3)
PROT SERPL-MCNC: 7.7 G/DL (ref 6.4–8.4)
RBC # BLD AUTO: 4.36 MILLION/UL (ref 3.81–5.12)
SODIUM SERPL-SCNC: 138 MMOL/L (ref 135–147)
TRIGL SERPL-MCNC: 81 MG/DL (ref ?–150)
TSH SERPL DL<=0.05 MIU/L-ACNC: 0.81 UIU/ML (ref 0.45–4.5)
WBC # BLD AUTO: 6.67 THOUSAND/UL (ref 4.31–10.16)

## 2025-03-13 PROCEDURE — 84443 ASSAY THYROID STIM HORMONE: CPT | Performed by: FAMILY MEDICINE

## 2025-03-13 PROCEDURE — 80061 LIPID PANEL: CPT | Performed by: FAMILY MEDICINE

## 2025-03-13 PROCEDURE — 36415 COLL VENOUS BLD VENIPUNCTURE: CPT | Performed by: FAMILY MEDICINE

## 2025-03-13 PROCEDURE — 85025 COMPLETE CBC W/AUTO DIFF WBC: CPT | Performed by: FAMILY MEDICINE

## 2025-03-13 PROCEDURE — 83036 HEMOGLOBIN GLYCOSYLATED A1C: CPT | Performed by: FAMILY MEDICINE

## 2025-03-13 PROCEDURE — 82306 VITAMIN D 25 HYDROXY: CPT | Performed by: FAMILY MEDICINE

## 2025-03-13 PROCEDURE — 82172 ASSAY OF APOLIPOPROTEIN: CPT | Performed by: FAMILY MEDICINE

## 2025-03-13 PROCEDURE — 83695 ASSAY OF LIPOPROTEIN(A): CPT | Performed by: FAMILY MEDICINE

## 2025-03-13 PROCEDURE — 99214 OFFICE O/P EST MOD 30 MIN: CPT | Performed by: FAMILY MEDICINE

## 2025-03-13 PROCEDURE — 99395 PREV VISIT EST AGE 18-39: CPT | Performed by: FAMILY MEDICINE

## 2025-03-13 PROCEDURE — 80053 COMPREHEN METABOLIC PANEL: CPT | Performed by: FAMILY MEDICINE

## 2025-03-13 RX ORDER — IPRATROPIUM BROMIDE 21 UG/1
2 SPRAY, METERED NASAL EVERY 12 HOURS
Qty: 30 ML | Refills: 0 | Status: SHIPPED | OUTPATIENT
Start: 2025-03-13 | End: 2025-03-13

## 2025-03-13 RX ORDER — BENZONATATE 100 MG/1
100 CAPSULE ORAL 3 TIMES DAILY PRN
Qty: 20 CAPSULE | Refills: 0 | Status: SHIPPED | OUTPATIENT
Start: 2025-03-13

## 2025-03-13 RX ORDER — BENZONATATE 100 MG/1
100 CAPSULE ORAL 3 TIMES DAILY PRN
Qty: 20 CAPSULE | Refills: 0 | Status: SHIPPED | OUTPATIENT
Start: 2025-03-13 | End: 2025-03-13

## 2025-03-13 RX ORDER — IPRATROPIUM BROMIDE 21 UG/1
2 SPRAY, METERED NASAL EVERY 12 HOURS
Qty: 30 ML | Refills: 0 | Status: SHIPPED | OUTPATIENT
Start: 2025-03-13

## 2025-03-13 NOTE — ASSESSMENT & PLAN NOTE
Previously A1c 5.8, worsening of diet.  Would recommend working on nutritional improvement as discussed  Follow-up with blood work and return in 3 months in the meantime build good habits.  Printout provided.  Orders:  •  Hemoglobin A1C

## 2025-03-13 NOTE — ASSESSMENT & PLAN NOTE
Depression Screening Follow-up Plan: Patient's depression screening was positive with a PHQ-2 score of 4. Their PHQ-9 score was 13. Patient assessed for underlying major depression. They have no active suicidal ideations. Brief counseling provided and recommend additional follow-up/re-evaluation next office visit.    Work on healthy coping mechanisms, printout provided with recommendations.  Referral placed for talk therapy, recommended calling [facility]   Will continue to monitor at next appointment, return in 3 months in the meantime complete blood work.      Orders:  •  Comprehensive metabolic panel  •  CBC and differential  •  Ambulatory referral to Psych Services; Future

## 2025-03-13 NOTE — PATIENT INSTRUCTIONS
"  Building Healthy Coping Mechanisms for Better Mental Health Recommendations:     Acknowledging and prioritizing your mental health is just as important as caring for your physical health.  Whether or not you take prescription medication, the most important \"prescription\" for your mental wellbeing will always be strengthening and utilizing healthy coping mechanisms. These strategies are essential for managing stress, regulating emotions, and enhancing overall mental resilience and wellbeing.    Emotional regulation techniques  Mindfulness and meditation  This helps to increase awareness of thoughts and emotions without judgment.  You can use apps like [employer] or Calm to guide you.  Deep breathing exercises  Box breathing: Inhaling for 4 seconds, hold for 4 seconds, exhale for 4 seconds, hold for 4 seconds. Repeat for 2 to 6 minutes.  4 - 4 - 8 technique: Inhale for 4 counts, hold for 4 counts, exhale for 8 counts.  Repeat for 2 to 6 minutes.  There are variety of others, you can look up additional techniques.   Journaling  Writing about thoughts and feelings can help process emotions.  I highly recommend the following layout: Start with at least 3 things you are grateful for, right what ever else is on your mind then and with at least 3 self affirmations (i.e.: \" I am worthy of love, respect, and happiness, just as I am.  I am ready of left, respect, and happiness, just as I am.\" \" I am growing and learning every day, and that is enough.\"  Or \" I choose to focus on the positive and let go of what I cannot control.\"    Cognitive strategies  Reframing negative thoughts  Challenge unhelpful thoughts and replace them with balanced perspectives.  Consider looking up Dr. Browne on YouTube and watch/listen to motivational videos to help with reframing your thought process.   Problem solving  Breakdown stressful situations into manageable steps  Setting boundaries  Set healthy boundaries to prevent burnout.    Creative " outlets   Engage in art, music, writing, sports, or any other hobbies that provide emotional release and relaxation.    Healthy lifestyle choices  Regular exercise  Activities like walking, yoga, or swimming releases endorphins and reduces stress  Balanced diet  Eat whole foods, lean protein, healthy fats (such as omega-3), and complex carbs to support brain health.  Good sleep hygiene  Maintain a consistent sleep schedule, limit screen time before bed, and create a calming bedtime routine.      Social and emotional support  Therapy or counseling  Connecting with others  Support groups  Spiritual practices    Stress management practices  Time management  Prioritize tasks and set realistic goals to prevent overwhelm.  Progressive muscle relaxation  Alternate between tensing and relaxing muscle groups to reduce physical tension.  You can look up some video examples on YouTube to help guide you through step by step based on your need.

## 2025-03-13 NOTE — TELEPHONE ENCOUNTER
Writer attempted to contact pt regarding referral for [facility] to verify services needed to schedule an appt. Lvm to call writer back.

## 2025-03-13 NOTE — TELEPHONE ENCOUNTER
"Behavioral Health Integration Screening Questionnaire     Are you aware of the referred from your [facility] Provider  : Yes     Please advise interviewee that they need to answer all questions truthfully to allow for best care, and any misrepresentations of information may affect their ability to be seen at this clinic   => Was this discussed? Yes     If Minor Child (under age 18)    Who is/are the legal guardian(s) of the child?     Is there a custody agreement? No     If \"YES\"- Custody orders must be obtained prior to scheduling the first appointment  In addition, Consent to Treatment must be signed by all legal guardians prior to scheduling the first appointment    If \"NO\"- Consent to Treatment must be signed by all legal guardians prior to scheduling the first appointment    Behavioral Health Outpatient Intake History -     Presenting Problem (in patient's own words): depression, anxiety    Are there any communication barriers for this patient?     No                                               If yes, please describe barriers:       Are you taking any psychiatric medications? No     If \"YES\" -What are they       If \"YES\" -Who prescribes?     Has the Patient abused alcohol or other substances in the last 6 months ? No  No concerns of substance abuse are reported.     If \"YES\" -What substance, How much, How often?     If illegal substance: Refer to [facility] (for ALEXA) or [facility].   If Alcohol in excess of 10 drinks per week:  Refer to [facility] (for ALEXA) or [facility]    ACCEPTED as a patient Yes  If \"Yes\" Appointment Date: 4/1/2025 at 12:00 pm    Referred Elsewhere? No  If “Yes” - (Where? Ex: Therapy Anywhere; [facility];  [facility], etc.)       Name of Insurance Co: [employer]  Insurance ID# [id number]  Insurance Phone #   If ins is primary or secondary? Primary  If patient is a minor, parents information such as Name, D.O.B of guarantor.  "

## 2025-03-13 NOTE — ASSESSMENT & PLAN NOTE
Follow-up with blood work and work on nutritional and lifestyle improvement.  Orders:  •  Comprehensive metabolic panel  •  CBC and differential  •  TSH, 3rd generation with Free T4 reflex  •  Lipid Panel with Direct LDL reflex  •  Apolipoprotein B  •  Lipoprotein A (LPA)

## 2025-03-13 NOTE — ASSESSMENT & PLAN NOTE
Refer to anxiety and depression problem list  Orders:  •  Ambulatory referral to Psych Services; Future

## 2025-03-13 NOTE — PROGRESS NOTES
[facility]  Annual Physical & Office Visit     PATIENT INFORMATION   Name: Katherin Iverson   YOB: 1995   MRN: [id number]  Encounter Provider: Santi Nick MD    Encounter Date: 3/13/2025    ASSESSMENT & PLAN     Assessment & Plan  Prediabetes  Previously A1c 5.8, worsening of diet.  Would recommend working on nutritional improvement as discussed  Follow-up with blood work and return in 3 months in the meantime build good habits.  Printout provided.  Orders:  •  Hemoglobin A1C    Mixed hyperlipidemia  Follow-up with blood work and work on nutritional and lifestyle improvement.  Orders:  •  Comprehensive metabolic panel  •  CBC and differential  •  TSH, 3rd generation with Free T4 reflex  •  Lipid Panel with Direct LDL reflex  •  Apolipoprotein B  •  Lipoprotein A (LPA)    Anxiety and depression  Depression Screening Follow-up Plan: Patient's depression screening was positive with a PHQ-2 score of 4. Their PHQ-9 score was 13. Patient assessed for underlying major depression. They have no active suicidal ideations. Brief counseling provided and recommend additional follow-up/re-evaluation next office visit.    Work on healthy coping mechanisms, printout provided with recommendations.  Referral placed for talk therapy, recommended calling [facility]   Will continue to monitor at next appointment, return in 3 months in the meantime complete blood work.      Orders:  •  Comprehensive metabolic panel  •  CBC and differential  •  Ambulatory referral to Psych Services; Future    Obesity, Class II, BMI 35-39.9  Wt Readings from Last 3 Encounters:   03/13/25 94.9 kg (209 lb 3.2 oz)   02/29/24 87.1 kg (192 lb)   02/22/24 87.3 kg (192 lb 6.4 oz)     Initial weight (03/13/25): 209 lbs, Body mass index is 39.53 kg/m².   TWL: - lbs      Currently not prescribed anti-diabetic or anti-obesity meds.   Reports poor diet  Assessment: Needs to work on nutritional intake, incorporating more movement  throughout the day, and building consistency with a regular exercise routine  Med management:None. Work on lifestyle improvement and complete labs as discussed.   Recommended focusing on building good habits over time by setting and being consistent with realistic goals.   Improve nutritional intake (recommended Mediterranean diet, low-carb diet)  Calorie control (creating a modest calorie deficit of 500-1000 -calorie/day)  Monitoring portion size and frequency of intake, limiting snacking as discussed  Aiming for Whole Foods and healthy fats  Limiting added sugars and processed foods as discussed  Exercising and move body throughout the day and regularly as discussed  Prioritize sleep and mental health  Return in 3 months for close monitoring.  Complete blood work in the meantime.          Umbilical hernia without obstruction and without gangrene  Noted periumbilical hernia without obstruction on imaging recently.  Referral placed for general surgery as patient does have severe pain intermittently  Orders:  •  Ambulatory Referral to General Surgery; Future    Panic attack  Refer to anxiety and depression problem list  Orders:  •  Ambulatory referral to Psych Services; Future    Viral URI with cough    Orders:  •  benzonatate (TESSALON PERLES) 100 mg capsule; Take 1 capsule (100 mg total) by mouth 3 (three) times a day as needed for cough  •  ipratropium (ATROVENT) 0.03 % nasal spray; 2 sprays into each nostril every 12 (twelve) hours    Annual physical exam  Healthcare Maintenance  Health maintenance completed today  - Medical history reviewed, including existing medical conditions, medications, and surgeries.   - Labs discussed to evaluate cholesterol, blood sugar, kidney function, liver function, and other important markers of health.  - BMI evaluated and discussed.  - Lifestyle and health counseling completed including diet, exercise habits, smoking status, alcohol consumption.   - Bone & Heart health  reviewed  - Cancer screenings discussed: Mammogram/Pap smear/CT lung/colonoscopy.   - Vaccination status reviewed and pertinent immunizations and booster shots discussed.  - Skin examination: Discussed importance of sunscreen and other preventative measures for skin cancer.  - Mental health and wellbeing evaluated and discussed.  - Family history obtained to identify any of hereditary health risks.  Lab orders in place as discussed  Start/continue preventative measures as discussed/advised  Complete preventative orders in place as recommended.   Refer to screenings problem list        Vitamin D insufficiency    Orders:  •  Vitamin D 25 hydroxy    Encounter for lipid screening for cardiovascular disease    Orders:  •  Lipid Panel with Direct LDL reflex  •  Apolipoprotein B  •  Lipoprotein A (LPA)       COUNSELING    Alcohol/drug use: discussed moderation in alcohol intake, the recommendations for healthy alcohol use, and avoidance of illicit drug use.  Dental Health: discussed importance of regular tooth brushing, flossing, and dental visits.  Injury prevention: discussed safety/seat belts, safety helmets, smoke detectors, carbon monoxide detectors, and smoking near bedding or upholstery.  Sexual health: discussed sexually transmitted diseases, partner selection, use of condoms, avoidance of unintended pregnancy, and contraceptive alternatives.  Exercise: the importance of regular exercise/physical activity was discussed. Recommend exercise 3-5 times per week for at least 30 minutes.      HEALTH MAINTENANCE     Immunization History   Administered Date(s) Administered   • INFLUENZA 12/01/2017   • Tdap 07/21/2015, 10/25/2017     Pap smear:05/17/2017  Mammogram:Not on file   Colonoscopy:Not on file Not on file  Cologuard:Not on file   DEXA scan:Not on file    FOLLOW UP   Return in about 3 months (around 6/13/2025) for after completion of labs.    CURRENT MEDICATIONS     Current Outpatient Medications:   •  benzonatate  (TESSALON PERLES) 100 mg capsule, Take 1 capsule (100 mg total) by mouth 3 (three) times a day as needed for cough, Disp: 20 capsule, Rfl: 0  •  ipratropium (ATROVENT) 0.03 % nasal spray, 2 sprays into each nostril every 12 (twelve) hours, Disp: 30 mL, Rfl: 0    ANNUAL PHYSICAL QUESTIONNAIRE    History of Present Illness     Katherin Iverson is a 29 y.o. who is here for annual physical exam.  History obtained from : patient  HPI    Concerns today: yes    LIFESTYLE  Nutritional intake: poor diet  Plain water hydration: good  Exercise and Movement: sedentary lifestyle and no formal exercise  Do you struggle with weight?  yes  Sleep: gets 4-6 hours of sleep on average, snores loudly, and experiences daytime hypersomnolence     MENTAL HEALTH  PHQ-2/9 Depression Screening    Little interest or pleasure in doing things: 2 - more than half the days  Feeling down, depressed, or hopeless: 2 - more than half the days  Trouble falling or staying asleep, or sleeping too much: 1 - several days  Feeling tired or having little energy: 3 - nearly every day  Poor appetite or overeating: 3 - nearly every day  Feeling bad about yourself - or that you are a failure or have let yourself or your family down: 1 - several days  Trouble concentrating on things, such as reading the newspaper or watching television: 1 - several days  Moving or speaking so slowly that other people could have noticed. Or the opposite - being so fidgety or restless that you have been moving around a lot more than usual: 0 - not at all  Thoughts that you would be better off dead, or of hurting yourself in some way: 0 - not at all  PHQ-2 Score: 4  PHQ-2 Interpretation: POSITIVE depression screen  PHQ-9 Score: 13  PHQ-9 Interpretation: Moderate depression       Anxiety: yes  History of SI/SH: no  Significant past trauma that has impacted patient's mental health: yes  Coping mechanisms with life's worries and obstacles: no    VISION, HEARING, DENTAL HEALTH  Hearing:  normal - bilateral  Vision: most recent eye exam >1 year and wears glasses  Dental: regular dental visits and brushes teeth twice daily    SUBSTANCE USE  Alcohol consumption: Yes Minimal   Smoking Cig: non-smoker  Vaping: no  Current use of recreational drugs: no   History of substance use: no     WOMEN'S HEALTH  Follow gynecology: yes  Menstrual cycles: yes; current menstrual pattern: flow is excessive with use of 7 pads or tampons on heaviest days  Sexually active: Yes - single partner - male  Do you have any problems with urination? no     FAMILY HISTORY, RISK FACTORS  Do you have any health concerns based on your family history or your risk factors? yes    PATIENT AWARENESS   Do you try your best to follow-up with your health screenings? yes  Do you track your health metrics? no    QUALITY OF LIFE   Do you have hobbies you enjoy? yes  Do you have a support system? yes      Review of Systems   Constitutional:  Negative for chills, fever and unexpected weight change.   Respiratory:  Positive for shortness of breath. Negative for chest tightness.    Cardiovascular:  Negative for chest pain.   Gastrointestinal:  Positive for abdominal pain. Negative for constipation, diarrhea, nausea and vomiting.   Endocrine: Negative for polydipsia and polyuria.   Genitourinary:  Negative for dysuria and hematuria.   Neurological:  Positive for headaches. Negative for dizziness, weakness and light-headedness.       ALLERGIES      Allergies   Allergen Reactions   • Flagyl [Metronidazole] Nausea Only   • Macrobid [Nitrofurantoin] Nausea Only       PAST MEDICAL & SURGICAL HISTORY      Past Medical History:   Diagnosis Date   • Anemia 10/6/2017   • Anemia complicating pregnancy 10/06/2017   • Anxiety    • Asthma    • Cerebral concussion 03/17/2017   • COVID-19 8/27/2021   • Depression    • GERD (gastroesophageal reflux disease)    • Group B Streptococcus carrier, antepartum 12/01/2017   • Head injury    • Herpes    • Migraine    •  "Peptic ulceration    • Postpartum depression    • Secondary amenorrhea 2020   • Varicella     vaccine     Past Surgical History:   Procedure Laterality Date   • APPENDECTOMY     •  SECTION     • COSMETIC SURGERY      liposuction and buttocks   • EGD     • HEMORROIDECTOMY  2021   • INDUCED   2012 2020    X 2   • [place]  DELIVERY ONLY N/A 2017    Procedure:  SECTION ();  Surgeon: Jai Gilbert DO;  Location: [facility];  Service: Obstetrics       FAMILY HISTORY & SOCIAL HISTORY      Family History   Problem Relation Age of Onset   • Asthma Mother    • Depression Mother    • Migraines Mother    • Diabetes type II Mother    • Other Mother         vertigo   • Diabetes Maternal Aunt    • Hypertension Maternal Aunt    • Diabetes Maternal Grandmother    • Hypertension Maternal Grandmother    • Breast cancer Maternal Grandmother    • Migraines Father    • Heart murmur Brother    • Lung cancer Paternal Grandmother    • Stroke Paternal Grandmother       Social History     Tobacco Use   • Smoking status: Former     Current packs/day: 0.00     Types: Cigarettes     Quit date: 2014     Years since quittin.2     Passive exposure: Past   • Smokeless tobacco: Never   • Tobacco comments:     Hooka ocass   Vaping Use   • Vaping status: Former   • Substances: Flavoring   Substance and Sexual Activity   • Alcohol use: Not Currently     Comment: socially    • Drug use: No   • Sexual activity: Not Currently     Partners: Male     Birth control/protection: None        OBJECTIVES      /73 (BP Location: Left arm, Patient Position: Sitting, Cuff Size: Large)   Pulse 87   Temp 98.7 °F (37.1 °C) (Tympanic)   Resp 14   Ht 5' 1\" (1.549 m)   Wt 94.9 kg (209 lb 3.2 oz)   SpO2 100%   BMI 39.53 kg/m²    Physical Exam  Vitals reviewed.   Constitutional:       General: She is not in acute distress.     Appearance: Normal appearance. She is not ill-appearing, toxic-appearing or " diaphoretic.   HENT:      Head: Normocephalic and atraumatic.      Right Ear: External ear normal.      Left Ear: External ear normal.      Nose: Nose normal.      Mouth/Throat:      Mouth: Mucous membranes are moist.   Eyes:      General: No scleral icterus.        Right eye: No discharge.         Left eye: No discharge.      Extraocular Movements: Extraocular movements intact.      Conjunctiva/sclera: Conjunctivae normal.   Cardiovascular:      Rate and Rhythm: Normal rate and regular rhythm.      Pulses: Normal pulses.      Heart sounds: Normal heart sounds.   Pulmonary:      Effort: Pulmonary effort is normal. No respiratory distress.      Breath sounds: Normal breath sounds.   Abdominal:      Palpations: Abdomen is soft.      Tenderness: There is no abdominal tenderness.   Musculoskeletal:         General: No swelling. Normal range of motion.      Cervical back: Normal range of motion.   Skin:     General: Skin is warm and dry.   Neurological:      General: No focal deficit present.      Mental Status: She is alert and oriented to person, place, and time.   Psychiatric:         Mood and Affect: Mood normal.         Behavior: Behavior normal.         Thought Content: Thought content normal.           Santi Nick MD  Family Medicine Physician   [facility] [place]      Administrative Statements     Medications have been reviewed by provider in current encounter

## 2025-03-13 NOTE — ASSESSMENT & PLAN NOTE
Wt Readings from Last 3 Encounters:   03/13/25 94.9 kg (209 lb 3.2 oz)   02/29/24 87.1 kg (192 lb)   02/22/24 87.3 kg (192 lb 6.4 oz)     Initial weight (03/13/25): 209 lbs, Body mass index is 39.53 kg/m².   TWL: - lbs      Currently not prescribed anti-diabetic or anti-obesity meds.   Reports poor diet  Assessment: Needs to work on nutritional intake, incorporating more movement throughout the day, and building consistency with a regular exercise routine  Med management:None. Work on lifestyle improvement and complete labs as discussed.   Recommended focusing on building good habits over time by setting and being consistent with realistic goals.   Improve nutritional intake (recommended Mediterranean diet, low-carb diet)  Calorie control (creating a modest calorie deficit of 500-1000 -calorie/day)  Monitoring portion size and frequency of intake, limiting snacking as discussed  Aiming for Whole Foods and healthy fats  Limiting added sugars and processed foods as discussed  Exercising and move body throughout the day and regularly as discussed  Prioritize sleep and mental health  Return in 3 months for close monitoring.  Complete blood work in the meantime.

## 2025-03-14 ENCOUNTER — RESULTS FOLLOW-UP (OUTPATIENT)
Age: 30
End: 2025-03-14

## 2025-03-14 DIAGNOSIS — E55.9 VITAMIN D DEFICIENCY: Primary | ICD-10-CM

## 2025-03-14 DIAGNOSIS — Z12.83 SCREENING EXAM FOR SKIN CANCER: ICD-10-CM

## 2025-03-14 RX ORDER — CHOLECALCIFEROL (VITAMIN D3) 1250 MCG
50000 CAPSULE ORAL 2 TIMES WEEKLY
Qty: 8 CAPSULE | Refills: 4 | Status: SHIPPED | OUTPATIENT
Start: 2025-03-17

## 2025-03-18 ENCOUNTER — OFFICE VISIT (OUTPATIENT)
Dept: GASTROENTEROLOGY | Facility: CLINIC | Age: 30
End: 2025-03-18
Payer: COMMERCIAL

## 2025-03-18 VITALS
SYSTOLIC BLOOD PRESSURE: 111 MMHG | WEIGHT: 203 LBS | OXYGEN SATURATION: 99 % | BODY MASS INDEX: 38.33 KG/M2 | DIASTOLIC BLOOD PRESSURE: 80 MMHG | HEIGHT: 61 IN | HEART RATE: 77 BPM | TEMPERATURE: 97.8 F

## 2025-03-18 DIAGNOSIS — A04.8 H. PYLORI INFECTION: ICD-10-CM

## 2025-03-18 DIAGNOSIS — K21.9 GASTROESOPHAGEAL REFLUX DISEASE WITHOUT ESOPHAGITIS: Primary | ICD-10-CM

## 2025-03-18 LAB
APO B SERPL-MCNC: 85 MG/DL
LPA SERPL-SCNC: 53.3 NMOL/L

## 2025-03-18 PROCEDURE — 99204 OFFICE O/P NEW MOD 45 MIN: CPT | Performed by: INTERNAL MEDICINE

## 2025-03-18 RX ORDER — SODIUM CHLORIDE, SODIUM LACTATE, POTASSIUM CHLORIDE, CALCIUM CHLORIDE 600; 310; 30; 20 MG/100ML; MG/100ML; MG/100ML; MG/100ML
125 INJECTION, SOLUTION INTRAVENOUS CONTINUOUS
Status: CANCELLED | OUTPATIENT
Start: 2025-03-18

## 2025-03-18 RX ORDER — PANTOPRAZOLE SODIUM 40 MG/1
40 TABLET, DELAYED RELEASE ORAL DAILY
Qty: 30 TABLET | Refills: 2 | Status: SHIPPED | OUTPATIENT
Start: 2025-03-18

## 2025-03-18 NOTE — ASSESSMENT & PLAN NOTE
Orders:    pantoprazole (PROTONIX) 40 mg tablet; Take 1 tablet (40 mg total) by mouth daily    EGD; Future

## 2025-03-18 NOTE — H&P (VIEW-ONLY)
Name: Katherin Iverson      : 1995      MRN: [id number]  Encounter Provider: Mingo Mccormack III, MD  Encounter Date: 3/18/2025   Encounter department: [facility] [place]  :  Assessment & Plan  Gastroesophageal reflux disease without esophagitis    Orders:    pantoprazole (PROTONIX) 40 mg tablet; Take 1 tablet (40 mg total) by mouth daily    EGD; Future    H. pylori infection  She is a 29-year-old female with longstanding GERD gastritis and H. pylori infection who is failed triple therapy, Levaquin and amoxicillin therapy, right for Butin based therapy who is here with severe early fullness heartburn regurgitation nausea and epigastric and left upper quadrant abdominal pain.  Her last endoscopy was done in 2022 with duodenal bulb erythema.  Her stool H. pylori antigen was positive in 2023    1 we talked about EGD with culture and sensitivity of H. pylori  and/or Voquenza based treatments as possible choices.  We agreed to do the EGD with biopsy and culture and sensitivity    2 she was honest and told me that she had abdominal pain and or nausea with prior regimens causing treatment interruptions and stoppage at 6 days or so.  I think giving dicyclomine and Zofran during treatment would be wise.    3 we will start pantoprazole    Orders:    pantoprazole (PROTONIX) 40 mg tablet; Take 1 tablet (40 mg total) by mouth daily    EGD; Future        History of Present Illness     Katherin Iverson is a 29 y.o. female who presents for evaluation of H. pylori infection.  She has failed triple therapy she has failed Levaquin amoxicillin.  She has failed right for Butin.  She had an endoscopy last with Dr. Mike 2022 with duodenal bulb erythema.  Her stool H. pylori antigen was positive in 2023.  She admits to only taking these regimens for 5 to 6 days due to severe pain and nausea.  She is never been treated with Zofran.  I present she reports  nausea and epigastric abdominal discomfort left upper quadrant abdominal discomfort and postprandial distress.  She reports gas reports heartburn which reports regurgitation she has no dysphagia she reports early fullness followed by hunger intensely 30 minutes later.  She does report NSAID use.  She has no melena.    HPI  History obtained from: patient  Review of Systems A complete review of systems is negative other than that noted above in the HPI.    Past Medical History   Past Medical History:   Diagnosis Date    Anemia 10/6/2017    Anemia complicating pregnancy 10/06/2017    Anxiety     Asthma     Cerebral concussion 2017    COVID-19 2021    Depression     GERD (gastroesophageal reflux disease)     Group B Streptococcus carrier, antepartum 2017    Head injury     Herpes     Migraine     Peptic ulceration     Postpartum depression     Secondary amenorrhea 2020    Varicella     vaccine     Past Surgical History:   Procedure Laterality Date    APPENDECTOMY       SECTION      COSMETIC SURGERY      liposuction and buttocks    EGD      HEMORRHOID SURGERY  2021    HEMORROIDECTOMY  2021    INDUCED   2012 2020    X 2    [place]  DELIVERY ONLY N/A 2017    Procedure:  SECTION ();  Surgeon: Jai Gilbert DO;  Location: [facility] ;  Service: Obstetrics     Family History   Problem Relation Age of Onset    Asthma Mother     Depression Mother     Migraines Mother     Diabetes type II Mother     Diabetes Maternal Aunt     Hypertension Maternal Aunt     Diabetes Maternal Grandmother     Hypertension Maternal Grandmother     Breast cancer Maternal Grandmother     Migraines Father     Heart murmur Brother     Lung cancer Paternal Grandmother     Stroke Paternal Grandmother       reports that she quit smoking about 11 years ago. Her smoking use included cigarettes. She has been exposed to tobacco smoke. She has never used smokeless tobacco. She reports that she  "does not currently use alcohol. She reports current drug use. Drug: Marijuana.  Current Outpatient Medications   Medication Instructions    benzonatate (TESSALON PERLES) 100 mg, Oral, 3 times daily PRN    ipratropium (ATROVENT) 0.03 % nasal spray 2 sprays, Nasal, Every 12 hours    pantoprazole (PROTONIX) 40 mg, Oral, Daily    Vitamin D3 50,000 Units, Oral, 2 times weekly     Allergies   Allergen Reactions    Flagyl [Metronidazole] Nausea Only    Macrobid [Nitrofurantoin] Nausea Only      Current Outpatient Medications   Medication Sig Dispense Refill    benzonatate (TESSALON PERLES) 100 mg capsule Take 1 capsule (100 mg total) by mouth 3 (three) times a day as needed for cough 20 capsule 0    Cholecalciferol (Vitamin D3) 1.25 MG (72542 UT) CAPS Take 1 capsule (50,000 Units total) by mouth 2 (two) times a week 8 capsule 4    ipratropium (ATROVENT) 0.03 % nasal spray 2 sprays into each nostril every 12 (twelve) hours (Patient taking differently: 2 sprays into each nostril if needed) 30 mL 0    pantoprazole (PROTONIX) 40 mg tablet Take 1 tablet (40 mg total) by mouth daily 30 tablet 2     No current facility-administered medications for this visit.     Objective   /80 (BP Location: Left arm, Patient Position: Sitting, Cuff Size: Large)   Pulse 77   Temp 97.8 °F (36.6 °C) (Tympanic)   Ht 5' 1\" (1.549 m)   Wt 92.1 kg (203 lb)   SpO2 99%   BMI 38.36 kg/m²     Physical Exam heart S1-S2 lungs are clear to auscultation bilaterally      Lab Results: I personally reviewed relevant lab results.       Results for orders placed during the hospital encounter of 09/19/22    EGD    Impression  Minimal duodenal bulb erythema, status post biopsy.  Otherwise normal EGD, status post gastric biopsy    RECOMMENDATION:  Follow-up biopsy results in 3 weeks  Continue current medical management        Jaguar Mike MD        "

## 2025-03-18 NOTE — ASSESSMENT & PLAN NOTE
She is a 29-year-old female with longstanding GERD gastritis and H. pylori infection who is failed triple therapy, Levaquin and amoxicillin therapy, right for Butin based therapy who is here with severe early fullness heartburn regurgitation nausea and epigastric and left upper quadrant abdominal pain.  Her last endoscopy was done in September 2022 with duodenal bulb erythema.  Her stool H. pylori antigen was positive in December 2023    1 we talked about EGD with culture and sensitivity of H. pylori  and/or Voquenza based treatments as possible choices.  We agreed to do the EGD with biopsy and culture and sensitivity    2 she was honest and told me that she had abdominal pain and or nausea with prior regimens causing treatment interruptions and stoppage at 6 days or so.  I think giving dicyclomine and Zofran during treatment would be wise.    3 we will start pantoprazole    Orders:    pantoprazole (PROTONIX) 40 mg tablet; Take 1 tablet (40 mg total) by mouth daily    EGD; Future

## 2025-03-18 NOTE — PROGRESS NOTES
Name: Katherin Iverson      : 1995      MRN: [id number]  Encounter Provider: Mingo Mccormack III, MD  Encounter Date: 3/18/2025   Encounter department: [facility] [place]  :  Assessment & Plan  Gastroesophageal reflux disease without esophagitis    Orders:    pantoprazole (PROTONIX) 40 mg tablet; Take 1 tablet (40 mg total) by mouth daily    EGD; Future    H. pylori infection  She is a 29-year-old female with longstanding GERD gastritis and H. pylori infection who is failed triple therapy, Levaquin and amoxicillin therapy, right for Butin based therapy who is here with severe early fullness heartburn regurgitation nausea and epigastric and left upper quadrant abdominal pain.  Her last endoscopy was done in 2022 with duodenal bulb erythema.  Her stool H. pylori antigen was positive in 2023    1 we talked about EGD with culture and sensitivity of H. pylori  and/or Voquenza based treatments as possible choices.  We agreed to do the EGD with biopsy and culture and sensitivity    2 she was honest and told me that she had abdominal pain and or nausea with prior regimens causing treatment interruptions and stoppage at 6 days or so.  I think giving dicyclomine and Zofran during treatment would be wise.    3 we will start pantoprazole    Orders:    pantoprazole (PROTONIX) 40 mg tablet; Take 1 tablet (40 mg total) by mouth daily    EGD; Future        History of Present Illness     Katherin Iverson is a 29 y.o. female who presents for evaluation of H. pylori infection.  She has failed triple therapy she has failed Levaquin amoxicillin.  She has failed right for Butin.  She had an endoscopy last with Dr. Mike 2022 with duodenal bulb erythema.  Her stool H. pylori antigen was positive in 2023.  She admits to only taking these regimens for 5 to 6 days due to severe pain and nausea.  She is never been treated with Zofran.  I present she reports  nausea and epigastric abdominal discomfort left upper quadrant abdominal discomfort and postprandial distress.  She reports gas reports heartburn which reports regurgitation she has no dysphagia she reports early fullness followed by hunger intensely 30 minutes later.  She does report NSAID use.  She has no melena.    HPI  History obtained from: patient  Review of Systems A complete review of systems is negative other than that noted above in the HPI.    Past Medical History   Past Medical History:   Diagnosis Date    Anemia 10/6/2017    Anemia complicating pregnancy 10/06/2017    Anxiety     Asthma     Cerebral concussion 2017    COVID-19 2021    Depression     GERD (gastroesophageal reflux disease)     Group B Streptococcus carrier, antepartum 2017    Head injury     Herpes     Migraine     Peptic ulceration     Postpartum depression     Secondary amenorrhea 2020    Varicella     vaccine     Past Surgical History:   Procedure Laterality Date    APPENDECTOMY       SECTION      COSMETIC SURGERY      liposuction and buttocks    EGD      HEMORRHOID SURGERY  2021    HEMORROIDECTOMY  2021    INDUCED   2012 2020    X 2    [place]  DELIVERY ONLY N/A 2017    Procedure:  SECTION ();  Surgeon: Jai Gilbert DO;  Location: [facility] ;  Service: Obstetrics     Family History   Problem Relation Age of Onset    Asthma Mother     Depression Mother     Migraines Mother     Diabetes type II Mother     Diabetes Maternal Aunt     Hypertension Maternal Aunt     Diabetes Maternal Grandmother     Hypertension Maternal Grandmother     Breast cancer Maternal Grandmother     Migraines Father     Heart murmur Brother     Lung cancer Paternal Grandmother     Stroke Paternal Grandmother       reports that she quit smoking about 11 years ago. Her smoking use included cigarettes. She has been exposed to tobacco smoke. She has never used smokeless tobacco. She reports that she  "does not currently use alcohol. She reports current drug use. Drug: Marijuana.  Current Outpatient Medications   Medication Instructions    benzonatate (TESSALON PERLES) 100 mg, Oral, 3 times daily PRN    ipratropium (ATROVENT) 0.03 % nasal spray 2 sprays, Nasal, Every 12 hours    pantoprazole (PROTONIX) 40 mg, Oral, Daily    Vitamin D3 50,000 Units, Oral, 2 times weekly     Allergies   Allergen Reactions    Flagyl [Metronidazole] Nausea Only    Macrobid [Nitrofurantoin] Nausea Only      Current Outpatient Medications   Medication Sig Dispense Refill    benzonatate (TESSALON PERLES) 100 mg capsule Take 1 capsule (100 mg total) by mouth 3 (three) times a day as needed for cough 20 capsule 0    Cholecalciferol (Vitamin D3) 1.25 MG (09260 UT) CAPS Take 1 capsule (50,000 Units total) by mouth 2 (two) times a week 8 capsule 4    ipratropium (ATROVENT) 0.03 % nasal spray 2 sprays into each nostril every 12 (twelve) hours (Patient taking differently: 2 sprays into each nostril if needed) 30 mL 0    pantoprazole (PROTONIX) 40 mg tablet Take 1 tablet (40 mg total) by mouth daily 30 tablet 2     No current facility-administered medications for this visit.     Objective   /80 (BP Location: Left arm, Patient Position: Sitting, Cuff Size: Large)   Pulse 77   Temp 97.8 °F (36.6 °C) (Tympanic)   Ht 5' 1\" (1.549 m)   Wt 92.1 kg (203 lb)   SpO2 99%   BMI 38.36 kg/m²     Physical Exam heart S1-S2 lungs are clear to auscultation bilaterally      Lab Results: I personally reviewed relevant lab results.       Results for orders placed during the hospital encounter of 09/19/22    EGD    Impression  Minimal duodenal bulb erythema, status post biopsy.  Otherwise normal EGD, status post gastric biopsy    RECOMMENDATION:  Follow-up biopsy results in 3 weeks  Continue current medical management        Jaguar Mike MD        "

## 2025-03-18 NOTE — PATIENT INSTRUCTIONS
Scheduled date of EGD(as of today):3/26/25  Physician performing EGD:[place]  Location of EGD:[place]  Instructions reviewed with patient by:Roxie DE LEON  Clearances:  none    H Pylori culture to be done

## 2025-03-26 ENCOUNTER — HOSPITAL ENCOUNTER (OUTPATIENT)
Dept: GASTROENTEROLOGY | Facility: HOSPITAL | Age: 30
Setting detail: OUTPATIENT SURGERY
Discharge: HOME | End: 2025-03-26
Attending: INTERNAL MEDICINE
Payer: COMMERCIAL

## 2025-03-26 ENCOUNTER — ANESTHESIA EVENT (OUTPATIENT)
Dept: GASTROENTEROLOGY | Facility: HOSPITAL | Age: 30
End: 2025-03-26
Payer: COMMERCIAL

## 2025-03-26 ENCOUNTER — ANESTHESIA (OUTPATIENT)
Dept: GASTROENTEROLOGY | Facility: HOSPITAL | Age: 30
End: 2025-03-26
Payer: COMMERCIAL

## 2025-03-26 VITALS
BODY MASS INDEX: 39 KG/M2 | SYSTOLIC BLOOD PRESSURE: 105 MMHG | TEMPERATURE: 99.1 F | OXYGEN SATURATION: 97 % | HEIGHT: 61 IN | WEIGHT: 206.57 LBS | RESPIRATION RATE: 18 BRPM | HEART RATE: 78 BPM | DIASTOLIC BLOOD PRESSURE: 59 MMHG

## 2025-03-26 DIAGNOSIS — K21.9 GASTROESOPHAGEAL REFLUX DISEASE WITHOUT ESOPHAGITIS: ICD-10-CM

## 2025-03-26 DIAGNOSIS — A04.8 H. PYLORI INFECTION: ICD-10-CM

## 2025-03-26 DIAGNOSIS — J06.9 VIRAL URI WITH COUGH: ICD-10-CM

## 2025-03-26 DIAGNOSIS — E66.812 OBESITY, CLASS II, BMI 35-39.9: Primary | ICD-10-CM

## 2025-03-26 LAB
EXT PREGNANCY TEST URINE: NEGATIVE
EXT. CONTROL: NORMAL

## 2025-03-26 PROCEDURE — 88305 TISSUE EXAM BY PATHOLOGIST: CPT | Performed by: PATHOLOGY

## 2025-03-26 PROCEDURE — 88342 IMHCHEM/IMCYTCHM 1ST ANTB: CPT | Performed by: PATHOLOGY

## 2025-03-26 PROCEDURE — 87181 SC STD AGAR DILUTION PER AGT: CPT | Performed by: INTERNAL MEDICINE

## 2025-03-26 PROCEDURE — 88341 IMHCHEM/IMCYTCHM EA ADD ANTB: CPT | Performed by: PATHOLOGY

## 2025-03-26 PROCEDURE — 43239 EGD BIOPSY SINGLE/MULTIPLE: CPT | Performed by: INTERNAL MEDICINE

## 2025-03-26 PROCEDURE — 81025 URINE PREGNANCY TEST: CPT | Performed by: INTERNAL MEDICINE

## 2025-03-26 PROCEDURE — 87081 CULTURE SCREEN ONLY: CPT | Performed by: INTERNAL MEDICINE

## 2025-03-26 RX ORDER — HYOSCYAMINE SULFATE 0.12 MG/1
0.12 TABLET SUBLINGUAL ONCE
Status: COMPLETED | OUTPATIENT
Start: 2025-03-26 | End: 2025-03-26

## 2025-03-26 RX ORDER — SODIUM CHLORIDE, SODIUM LACTATE, POTASSIUM CHLORIDE, CALCIUM CHLORIDE 600; 310; 30; 20 MG/100ML; MG/100ML; MG/100ML; MG/100ML
125 INJECTION, SOLUTION INTRAVENOUS CONTINUOUS
Status: DISCONTINUED | OUTPATIENT
Start: 2025-03-26 | End: 2025-03-30 | Stop reason: HOSPADM

## 2025-03-26 RX ORDER — PROPOFOL 10 MG/ML
INJECTION, EMULSION INTRAVENOUS AS NEEDED
Status: DISCONTINUED | OUTPATIENT
Start: 2025-03-26 | End: 2025-03-26

## 2025-03-26 RX ORDER — LIDOCAINE HYDROCHLORIDE 20 MG/ML
INJECTION, SOLUTION EPIDURAL; INFILTRATION; INTRACAUDAL; PERINEURAL AS NEEDED
Status: DISCONTINUED | OUTPATIENT
Start: 2025-03-26 | End: 2025-03-26

## 2025-03-26 RX ADMIN — PROPOFOL 30 MG: 10 INJECTION, EMULSION INTRAVENOUS at 09:42

## 2025-03-26 RX ADMIN — SODIUM CHLORIDE, SODIUM LACTATE, POTASSIUM CHLORIDE, AND CALCIUM CHLORIDE: .6; .31; .03; .02 INJECTION, SOLUTION INTRAVENOUS at 09:27

## 2025-03-26 RX ADMIN — PROPOFOL 30 MG: 10 INJECTION, EMULSION INTRAVENOUS at 09:40

## 2025-03-26 RX ADMIN — PROPOFOL 30 MG: 10 INJECTION, EMULSION INTRAVENOUS at 09:41

## 2025-03-26 RX ADMIN — PROPOFOL 20 MG: 10 INJECTION, EMULSION INTRAVENOUS at 09:39

## 2025-03-26 RX ADMIN — HYOSCYAMINE SULFATE 0.12 MG: 0.12 TABLET SUBLINGUAL at 10:34

## 2025-03-26 RX ADMIN — PROPOFOL 150 MG: 10 INJECTION, EMULSION INTRAVENOUS at 09:37

## 2025-03-26 RX ADMIN — LIDOCAINE HYDROCHLORIDE 100 MG: 20 INJECTION, SOLUTION EPIDURAL; INFILTRATION; INTRACAUDAL; PERINEURAL at 09:37

## 2025-03-26 NOTE — PERIOPERATIVE NURSING NOTE
Pt informed MD that she was having some gas pains after her IV was d/c.  Had pt walk around, MD ordered levsin   See MAR.    Pt stated after walking and med she is feeling better.

## 2025-03-26 NOTE — ANESTHESIA POSTPROCEDURE EVALUATION
Post-Op Assessment Note    CV Status:  Stable    Pain management: adequate       Mental Status:  Sleepy and arousable   Hydration Status:  Euvolemic   PONV Controlled:  Controlled   Airway Patency:  Patent     Post Op Vitals Reviewed: Yes    No anethesia notable event occurred.    Staff: CRNA           Last Filed PACU Vitals:  Vitals Value Taken Time   Temp 99.1 °F (37.3 °C) 03/26/25 0948   Pulse 84 03/26/25 0948   BP 95/57 03/26/25 0948   Resp 19 03/26/25 0948   SpO2 97 % 03/26/25 0948       Modified Laura:     Vitals Value Taken Time   Activity 2 03/26/25 0949   Respiration 2 03/26/25 0949   Circulation 2 03/26/25 0949   Consciousness 1 03/26/25 0949   Oxygen Saturation 2 03/26/25 0949     Modified Laura Score: 9

## 2025-03-26 NOTE — INTERVAL H&P NOTE
H&P reviewed. After examining the patient I find no changes in the patients condition since the H&P had been written.    Vitals:    03/26/25 0823   BP: 118/74   Pulse: 70   Resp: 16   Temp: 98.5 °F (36.9 °C)   SpO2: 100%

## 2025-03-26 NOTE — ANESTHESIA PREPROCEDURE EVALUATION
Procedure:  EGD    Relevant Problems   ANESTHESIA (within normal limits)  Reports migraines after anesthesia, counseled on hydration + caffeine after procedure      CARDIO   (+) Hemorrhoids   (+) Migrainous headache without aura   (+) Mixed hyperlipidemia   (-) MI (myocardial infarction) (HCC)      ENDO (within normal limits)      GI/HEPATIC   (+) Gastroesophageal reflux disease without esophagitis      /RENAL (within normal limits)      HEMATOLOGY   (+) Anemia   (+) Iron deficiency anemia secondary to blood loss (chronic)      NEURO/PSYCH   (+) Anxiety and depression   (+) Migrainous headache without aura   (+) Panic attack      PULMONARY (within normal limits)   (-) Sleep apnea   (-) Smoking   (-) URI (upper respiratory infection)     Allergies   Allergen Reactions    Flagyl [Metronidazole] Nausea Only    Macrobid [Nitrofurantoin] Nausea Only     Social History     Tobacco Use    Smoking status: Former     Current packs/day: 0.00     Types: Cigarettes     Quit date: 2014     Years since quittin.2     Passive exposure: Past    Smokeless tobacco: Never    Tobacco comments:     Hooka ocass   Vaping Use    Vaping status: Former    Substances: Flavoring   Substance Use Topics    Alcohol use: Not Currently     Comment: socially     Drug use: Yes     Types: Marijuana     Current Outpatient Medications   Medication Instructions    benzonatate (TESSALON PERLES) 100 mg, Oral, 3 times daily PRN    ipratropium (ATROVENT) 0.03 % nasal spray 2 sprays, Nasal, Every 12 hours    pantoprazole (PROTONIX) 40 mg, Oral, Daily    Vitamin D3 50,000 Units, Oral, 2 times weekly     Lab Results   Component Value Date    WBC 6.67 2025    HGB 10.2 (L) 2025    HCT 34.7 (L) 2025     2025    SODIUM 138 2025    K 3.8 2025     2025    CO2 25 2025    BUN 7 2025    CREATININE 0.51 (L) 2025    GLUC 115 2024    HGBA1C 5.9 (H) 2025    AST 15 2025     ALT 9 03/13/2025    ALKPHOS 59 03/13/2025    TBILI 0.42 03/13/2025    ALB 4.3 03/13/2025    PROTIME 13.8 09/23/2020    PTT 33 09/23/2020    INR 1.1 08/26/2023     Vitals:    03/26/25 0823   BP: 118/74   Pulse: 70   Resp: 16   Temp: 98.5 °F (36.9 °C)   SpO2: 100%     EKG 9/23/20  Normal sinus rhythm  Low voltage QRS  Incomplete right bundle branch block  Confirmed by Hilda Nolen ([id number]) on 9/23/2020 10:27:40 PM    Physical Exam    Airway    Mallampati score: I  TM Distance: >3 FB  Neck ROM: full     Dental   Comment: Denies loose/chipped teeth, No notable dental hx     Cardiovascular  Rhythm: regular, Rate: normal, Cardiovascular exam normal    Pulmonary  Pulmonary exam normal Breath sounds clear to auscultation    Other Findings  post-pubertal.      Anesthesia Plan  ASA Score- 2     Anesthesia Type- IV sedation with anesthesia with ASA Monitors.         Additional Monitors:     Airway Plan:     Comment: O2 mask, natural airway, EtCO2 monitor. Risks discussed including awareness, aspiration, drug reactions and conversion to GA..       Plan Factors-Exercise tolerance (METS): >4 METS.    Chart reviewed. EKG reviewed.  Existing labs reviewed. Patient summary reviewed.    Patient is not a current smoker.              Induction- intravenous.    Postoperative Plan-         Informed Consent- Anesthetic plan and risks discussed with patient.  I personally reviewed this patient with the CRNA. Discussed and agreed on the Anesthesia Plan with the CRNA..    NPO Status:  Vitals Value Taken Time   Date of last liquid 03/25/25 03/26/25 0820   Time of last liquid 2000 03/26/25 0820   Date of last solid 03/25/25 03/26/25 0820   Time of last solid 2000 03/26/25 0820

## 2025-04-01 ENCOUNTER — OFFICE VISIT (OUTPATIENT)
Age: 30
End: 2025-04-01
Payer: COMMERCIAL

## 2025-04-01 ENCOUNTER — TELEPHONE (OUTPATIENT)
Age: 30
End: 2025-04-01

## 2025-04-01 ENCOUNTER — RESULTS FOLLOW-UP (OUTPATIENT)
Dept: GASTROENTEROLOGY | Facility: CLINIC | Age: 30
End: 2025-04-01

## 2025-04-01 DIAGNOSIS — F32.A ANXIETY AND DEPRESSION: Primary | ICD-10-CM

## 2025-04-01 DIAGNOSIS — F41.9 ANXIETY AND DEPRESSION: Primary | ICD-10-CM

## 2025-04-01 DIAGNOSIS — F41.0 PANIC ATTACK: ICD-10-CM

## 2025-04-01 PROCEDURE — 88342 IMHCHEM/IMCYTCHM 1ST ANTB: CPT | Performed by: PATHOLOGY

## 2025-04-01 PROCEDURE — 88305 TISSUE EXAM BY PATHOLOGIST: CPT | Performed by: PATHOLOGY

## 2025-04-01 PROCEDURE — 88341 IMHCHEM/IMCYTCHM EA ADD ANTB: CPT | Performed by: PATHOLOGY

## 2025-04-01 PROCEDURE — 90791 PSYCH DIAGNOSTIC EVALUATION: CPT | Performed by: SOCIAL WORKER
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
## 2025-04-01 NOTE — BH CRISIS PLAN
Client Name: Katherin Iverson       Client YOB: 1995    [facility] Safety Plan      Creation Date: 4/1/25 Update Date: 4/1/26   Created By: Angeline Adhikari       Step 1: Warning Signs:   Warning Signs   Crying            Step 2: Internal Coping Strategies:   Internal Coping Strategies   Clean            Step 3: People and social settings that provide distraction:   Name Contact Information   My kids In cell phone    Places   [place]           Step 4: People whom I can ask for help during a crisis:      Name Contact Information    Mg (mother) in cell phone    Sirisha In cell phone      Step 5: Professionals or agencies I can contact during a crisis:      Clinican/Agency Name Phone Emergency Contact    None        Local Emergency Department Emergency Department Phone Emergency Department Address    [phone]. [address] [facility] [place]        Crisis Phone Numbers:   Suicide Prevention Lifeline: Call or Text  988 Crisis Text Line: Text HOME to 828-280   Please note: Some [facility] do not have a separate number for Child/Adolescent specific crisis. If your county is not listed under Child/Adolescent, please call the adult number for your county      Adult Crisis Numbers: Child/Adolescent Crisis Numbers   [facility]: [phone] [facility]: [phone]   [facility]: [phone] [facility]: [phone]   [facility]: [phone] [place]: [phone]   [facility]: [phone] [place]: [phone]   [facility]: [phone]   [facility]: [phone]   [facility]: [phone]   [facility]: [phone] (daytime) [phone] (after hours, weekends, holidays)      Step 6: Making the environment safer (plan for lethal means safety):   Patient did not identify any lethal methods: Yes     Optional: What is most important to me and worth living for?   My children     [employer] Safety  Plan. Lynnette Lopez and Jaun Carr. Used with permission of the authors.

## 2025-04-01 NOTE — TELEPHONE ENCOUNTER
Patient cancelled her appt today with Dr. Dorado and reschedule to later in the month with Dr. Taylor

## 2025-04-01 NOTE — BH TREATMENT PLAN
"Outpatient Behavioral Health Psychotherapy Treatment Plan    Katherin Iverson  1995     Date of Initial Psychotherapy Assessment: 04/01/2025   Date of Current Treatment Plan: 04/01/25  Treatment Plan Target Date: TBD  Treatment Plan Expiration Date: 09/30/2025    Diagnosis:   1. Anxiety and depression  Ambulatory referral to Psych Services      2. Panic attack  Ambulatory referral to Psych Services          Area(s) of Need: Learn how to manage her anxiety, panic attacks and depression through the use of CBT and Mindfulness    Long Term Goal 1 (in the client's own words): \"\"To help with coping skills and learn how to deal with things.    Stage of Change: Preparation    Target Date for completion: TBD     Anticipated therapeutic modalities: CBT and Mindfulness     People identified to complete this goal: Therapist and Katherin      Objective 1: (identify the means of measuring success in meeting the objective): Learn and implement mindfulness techniques and practice in bi monthly session      Objective 2: (identify the means of measuring success in meeting the objective): Katherin will identify situations which cause her the most stress to process in bi weekly sessions.            I am currently under the care of a [facility] psychiatric provider: no    My [facility] psychiatric provider is: N/;A    I am currently taking psychiatric medications: No    I feel that I will be ready for discharge from mental health care when I reach the following (measurable goal/objective): \"When I have coping skills and can deal with past and current trauma.    For children and adults who have a legal guardian:   Has there been any change to custody orders and/or guardianship status? NA. If yes, attach updated documentation.    I have created my Crisis Plan and have been offered a copy of this plan    Behavioral Health Treatment Plan [facility]: Diagnosis and Treatment Plan explained to Katherin Iverson acknowledges an " understanding of their diagnosis. Katherin Iverson agrees to this treatment plan.    I have been offered a copy of this Treatment Plan. yes      Electronically Signed by Angeline Adhikari

## 2025-04-08 LAB
Lab: NORMAL
Lab: NORMAL
MISCELLANEOUS LAB TEST RESULT: NORMAL

## 2025-04-08 RX ORDER — TETRACYCLINE HYDROCHLORIDE 500 MG/1
500 CAPSULE ORAL 4 TIMES DAILY
Qty: 40 CAPSULE | Refills: 0 | Status: SHIPPED | OUTPATIENT
Start: 2025-04-08 | End: 2025-04-18

## 2025-04-08 RX ORDER — PANTOPRAZOLE SODIUM 40 MG/1
40 TABLET, DELAYED RELEASE ORAL 2 TIMES DAILY
Qty: 60 TABLET | Refills: 2 | Status: SHIPPED | OUTPATIENT
Start: 2025-04-08

## 2025-04-08 RX ORDER — ONDANSETRON 4 MG/1
4 TABLET, FILM COATED ORAL EVERY 8 HOURS PRN
Qty: 40 TABLET | Refills: 0 | Status: SHIPPED | OUTPATIENT
Start: 2025-04-08

## 2025-04-08 RX ORDER — AMOXICILLIN 500 MG/1
1000 CAPSULE ORAL EVERY 8 HOURS SCHEDULED
Qty: 60 CAPSULE | Refills: 0 | Status: SHIPPED | OUTPATIENT
Start: 2025-04-08 | End: 2025-04-18

## 2025-04-08 NOTE — RESULT ENCOUNTER NOTE
Carlos Laguna I spoke to her.  She is resistant to Flagyl and Biaxin.  She is sensitive to tetracycline and amoxicillin.  I will put her on a 4 medicine regimen:  Tetracycline  Amoxicillin  Protonix twice daily  Pepto-Bismol tablets 3 to 4 pills 3 times a day  I also sent Zofran for her to feel better  She will do the stool H. pylori test after this 10-day regimen.

## 2025-04-11 NOTE — TELEPHONE ENCOUNTER
Please advise:    Pt calling in stating that she started her coarse of Quad therapy for H. Pylori yesterday and now she is experiencing some chest tightness and mild SOB. Advised pt to stop the treatment and if symptoms don't go away to try taking a dose of Benadryl and if they worsen to go to ER. Pt verbalized understanding.

## 2025-04-14 ENCOUNTER — TELEPHONE (OUTPATIENT)
Dept: GASTROENTEROLOGY | Facility: CLINIC | Age: 30
End: 2025-04-14

## 2025-04-14 DIAGNOSIS — A04.8 H. PYLORI INFECTION: ICD-10-CM

## 2025-04-14 RX ORDER — TETRACYCLINE HYDROCHLORIDE 500 MG/1
500 CAPSULE ORAL 4 TIMES DAILY
Qty: 12 CAPSULE | Refills: 0 | Status: SHIPPED | OUTPATIENT
Start: 2025-04-14 | End: 2025-04-17

## 2025-04-14 RX ORDER — AMOXICILLIN 500 MG/1
1000 CAPSULE ORAL EVERY 8 HOURS SCHEDULED
Qty: 18 CAPSULE | Refills: 0 | Status: SHIPPED | OUTPATIENT
Start: 2025-04-14 | End: 2025-04-17

## 2025-04-14 NOTE — TELEPHONE ENCOUNTER
I spoke with her.  She had chest tightness but did not have shortness of breath or hives or neck swelling or fevers chills rash  I think she had anxiety.  I told her to proceed with the treatment.  I sent her 2 extra days of medicine.

## 2025-04-14 NOTE — TELEPHONE ENCOUNTER
Pt reports she stopped taking her H.Pylori treatment after 2 days because she began experiencing chest pain/tightness -she is unsure if it was related to the medications or anxiety. These symptoms have subsided. Pt now reports new onset dark stools daily. Pt was made aware that this could be a side effect of Pepto. Informed pt this encounter would be forwarded to her provider for recommendations.

## 2025-05-08 ENCOUNTER — NURSE TRIAGE (OUTPATIENT)
Age: 30
End: 2025-05-08

## 2025-05-08 NOTE — TELEPHONE ENCOUNTER
"FOLLOW UP: patient going to Urgent care    REASON FOR CONVERSATION: Urinary Symptoms    SYMPTOMS: back pain, bladder pressure/pain, frequency in urination, vaginal itching    OTHER: patient calling in with UTI and yeast infection symptoms.  She reports she is on tetracycline and amoxicillin for h. Pylori and feels she is developing a yeast infection. She also c/o bladder pressure pain x5 days, and now some back pain.  An at-home urine test showed bacteria in her urine.  She denies fevers/chills, severe pain and hematuria.  Advised urgent care, as patient is in [place] and not available for an office appointment today.  Encouraged increasing fluids.  Advised good genital hygiene such as mild unscented soaps to wash, cotton underwear, and the importance of staying clean and dry.      DISPOSITION: See Today in Office    Reason for Disposition   Side (flank) or lower back pain present    Answer Assessment - Initial Assessment Questions  1. SYMPTOM: \"What's the main symptom you're concerned about?\" (e.g., frequency, incontinence)      Frequency, bladder pressure/pain, lower back pain  2. ONSET: \"When did the  symptoms  start?\"      5 day  3. PAIN: \"Is there any pain?\" If Yes, ask: \"How bad is it?\" (Scale: 1-10; mild, moderate, severe)      5/10  4. CAUSE: \"What do you think is causing the symptoms?\"      UTI/Chinese  5. OTHER SYMPTOMS: \"Do you have any other symptoms?\" (e.g., blood in urine, fever, flank pain, pain with urination)      Lower back pain  6. PREGNANCY: \"Is there any chance you are pregnant?\" \"When was your last menstrual period?\"      LMP: due today    Protocols used: Urinary Symptoms-Adult-[place]    "

## 2025-05-12 ENCOUNTER — TELEPHONE (OUTPATIENT)
Dept: GASTROENTEROLOGY | Facility: CLINIC | Age: 30
End: 2025-05-12

## 2025-05-13 ENCOUNTER — TELEPHONE (OUTPATIENT)
Dept: GASTROENTEROLOGY | Facility: CLINIC | Age: 30
End: 2025-05-13

## 2025-05-13 NOTE — TELEPHONE ENCOUNTER
How many days of antibiotics did she complete? She was on antibiotics for 2 weeks to treat H. Pylori so depending on how much she was able to take, we may do the stool test and see if it was enough to get rid of it or if we will need to find another regimen of antibiotics to use.

## 2025-05-13 NOTE — TELEPHONE ENCOUNTER
Called & spoke to patient. Gave patient message as per Dr Mccormack    Patient stated that she did have hives... and since she stopped antibiotics it went away    Please advise thank you !

## 2025-05-14 NOTE — TELEPHONE ENCOUNTER
Called & spoke to patient. Gave patient message as per Berta regarding stool test and medication    Patient voiced understanding and had no further questions or concerns

## 2025-05-14 NOTE — TELEPHONE ENCOUNTER
Routing to PA      Called & spoke to patient. She stated that she was on the antibiotics for 8 days.        Please advise. Thank you !

## 2025-05-14 NOTE — TELEPHONE ENCOUNTER
Okay lets have her do the H. Pylori stool test in about 4 weeks and she needs to stop taking pantoprazole for 2 weeks prior to this for accurate results. If the test is still positive we will find another regimen of antibiotics to give her.

## 2025-06-02 NOTE — ADDENDUM NOTE
Acute Normovolemic Hemodilution Note    Pre-Procedure   Staff -        Anesthesiologist:  Ubaldo Tamayo MD       Performed By: anesthesiologist       Location: In OR after induction       Pre-Anesthestic Checklist: patient identified, IV checked, site marked, risks and benefits discussed, informed consent, monitors and equipment checked, pre-op evaluation and at physician/surgeon's request   Procedure: Normovolemic Hemodilution  Date/Time of Collection:  6/2/2025 8:12 AM  Date/Time of Expiration:6/3/2025 8:52 AM  Volume Collected: 250 ml  Amount Infused: 250 ml   Amount Discarded: 0 ml  Provider Collecting: Ubaldo Tamayo MD   Transfusion Date/Time: 6/2/2025 12:13 PM       Addended by: OMAR KHANNA on: 3/14/2025 09:57 AM     Modules accepted: Level of Service

## 2025-06-03 ENCOUNTER — CONSULT (OUTPATIENT)
Age: 30
End: 2025-06-03
Payer: COMMERCIAL

## 2025-06-03 VITALS — WEIGHT: 206 LBS | BODY MASS INDEX: 38.92 KG/M2

## 2025-06-03 DIAGNOSIS — L70.0 ACNE VULGARIS: Primary | ICD-10-CM

## 2025-06-03 PROCEDURE — 99244 OFF/OP CNSLTJ NEW/EST MOD 40: CPT

## 2025-06-03 RX ORDER — CLINDAMYCIN PHOSPHATE 10 UG/ML
LOTION TOPICAL
Qty: 60 ML | Refills: 3 | Status: SHIPPED | OUTPATIENT
Start: 2025-06-03

## 2025-06-03 RX ORDER — SPIRONOLACTONE 50 MG/1
TABLET, FILM COATED ORAL
Qty: 60 TABLET | Refills: 2 | Status: SHIPPED | OUTPATIENT
Start: 2025-06-03

## 2025-06-03 RX ORDER — TETRACYCLINE HYDROCHLORIDE 500 MG/1
CAPSULE ORAL
COMMUNITY
Start: 2025-05-05

## 2025-06-03 RX ORDER — TRETINOIN 0.25 MG/G
CREAM TOPICAL
Qty: 45 G | Refills: 3 | Status: SHIPPED | OUTPATIENT
Start: 2025-06-03

## 2025-06-03 NOTE — PATIENT INSTRUCTIONS
"Plan today:     AM:  - Start benzoyl peroxide cleanser (over the counter products like Panoxyl, CeraVe and Neutrogena contain this product listed under \"active ingredients\")  - Start clindamycin 1% lotion to affected skin.  - SPF 30 or greater (can be a lotion with SPF like CeraVe AM)/non-comedogenic moisturizer such as CeraVe, Cetaphil or Vanicream.     PM:  - Gentle cleanser, such as CeraVe, Cetaphil or La Roche Posay  - Start Tretinoin 0.025% qHS. Educated that this medication can be drying and irritating. Start by applying a pea-sized amount of product 2 nights per week, then increase to nightly as tolerated.  - non-comedogenic moisturizer such as CeraVe, Cetaphil or Vanicream. Can be used on top of retinoid.  - Start Spirolactone 50 mg; Take 1 pill (50 mg) once daily for two weeks. Take with large glass of water. If well-tolerated, increase to 2 pills (100 mg) daily. STOP IMMEDIATELY IF YOU BECOME UNINTENTIONALLY PREGNANT or if you decide to plan for pregnancy. Avoid large amounts of high potassium food or beverages while taking this medication.    "

## 2025-06-03 NOTE — PROGRESS NOTES
"[facility] Dermatology Clinic Note     Patient Name: Katherin Iverson  Encounter Date: 6/3/25       Have you been cared for by a [facility] Dermatologist in the last 3 years and, if so, which description applies to you? NO. I am considered a \"new\" patient and must complete all patient intake questions. I am of child-bearing potential.     REVIEW OF SYSTEMS:  Have you recently had or currently have any of the following? Recent fever or chills? No  Any non-healing wound? No  Are you pregnant or planning to become pregnant? No  Are you currently or planning to be nursing or breast feeding? No   PAST MEDICAL HISTORY:  Have you personally ever had or currently have any of the following?  If \"YES,\" then please provide more detail. Skin cancer (such as Melanoma, Basal Cell Carcinoma, Squamous Cell Carcinoma?  No  Tuberculosis, HIV/AIDS, Hepatitis B or C: No  Radiation Treatment No   HISTORY OF IMMUNOSUPPRESSION:   Do you have a history of any of the following:  Systemic Immunosuppression such as Diabetes, Biologic or Immunotherapy, Chemotherapy, Organ Transplantation, Bone Marrow Transplantation or Prednsione?  No    Answering \"YES\" requires the addition of the dotphrase \"IMMUNOSUPPRESSED\" as the first diagnosis of the patient's visit.   FAMILY HISTORY:  Any \"first degree relatives\" (parent, brother, sister, or child) with the following?    Skin Cancer, Pancreatic or Other Cancer? No   PATIENT EXPERIENCE:    Do you want the Dermatologist to perform a COMPLETE skin exam today including a clinical examination under the \"bra and underwear\" areas?  NO  If necessary, do we have your permission to call and leave a detailed message on your Preferred Phone number that includes your specific medical information?  Yes      Allergies[1] Current Medications[2]              Whom besides the patient is providing clinical information about today's encounter?   NO ADDITIONAL HISTORIAN (patient alone provided history)    Physical Exam and " "Assessment/Plan by Diagnosis:    ACNE VULGARIS    Physical Exam:  Anatomic Location Affected:  face, chest and back   Morphological Description: Scattered pink papules and open/closed comedones on face, post inflammatory hyperpigmentation on back    Additional History of Present Condition:  Patient is a new patient who presents for an evaluation of acne.Patient was using Curology but noticed she was breaking out. She states she tried clindamycin lotion and Tretinoin, but patient was not very constant with the topicals and notes the tretinoin caused her skin to purge. Patient notes her acne does flare around her menstrual cycle. She is not pregnant, planning on becoming pregnant or breast feeding. Patient denies a personal or family history of breast cancer, ovarian cancer, or uterine cancer.     Discussed that treatment is directed at improving skin appearance and reducing the likelihood of scarring. Discussed theraputic ladder including topical OTC treatments, topical prescriptions, and oral medications. Discussed side effects as noted below.     Plan today:     AM:  - Start benzoyl peroxide cleanser (over the counter products like Panoxyl, CeraVe and Neutrogena contain this product listed under \"active ingredients\"). Use 4% for face, and 10% for chest and back   - Start clindamycin 1% lotion -After cleansing face in the morning, apply a small amount to entire face. Apply daily. Avoid eyes and corners of the mouth.   - SPF 30 or greater (can be a lotion with SPF like CeraVe AM)/non-comedogenic moisturizer such as CeraVe, Cetaphil or Vanicream.     PM:  - Gentle cleanser, such as CeraVe, Cetaphil or La Roche Posay  - Start Tretinoin 0.025% cream-Apply a pea-sized amount evenly to the entire face one hour before bedtime. Start by applying every other night, advance to nightly as tolerated. Avoid eyes and mouth.   - non-comedogenic moisturizer such as CeraVe, Cetaphil or Vanicream. Can be used on top of retinoid.  - " Start Spirolactone 50 mg ;Take 1 pill (50 mg) once in the evening for two weeks. Take with large glass of water. If well-tolerated, increase to 2 pills (100 mg) in the evening. STOP IMMEDIATELY IF YOU BECOME UNINTENTIONALLY PREGNANT or if you decide to plan for pregnancy. Avoid large amounts of high potassium food or beverages while taking this medication.S/E reviewed including menstrual irregularity, breast tenderness, headaches, GI upset, K+ retention, palpitations, teratogenicity/feminization of male fetus.    Call with any questions or concerns before next follow-up visit; do not stop medications abruptly without consulting provider.    Follow up in 3 months, sooner for concerns.    Scribe Attestation      I,:  Shilpa Dickson MA am acting as a scribe while in the presence of the attending physician.:       I,:  Magdalena Balderrama PA-C personally performed the services described in this documentation    as scribed in my presence.:                  [1]   Allergies  Allergen Reactions    Flagyl [Metronidazole] Nausea Only    Macrobid [Nitrofurantoin] Nausea Only   [2]   Current Outpatient Medications:     Cholecalciferol (Vitamin D3) 1.25 MG (71588 UT) CAPS, Take 1 capsule (50,000 Units total) by mouth 2 (two) times a week, Disp: 8 capsule, Rfl: 4    ondansetron (ZOFRAN) 4 mg tablet, Take 1 tablet (4 mg total) by mouth every 8 (eight) hours as needed for nausea or vomiting, Disp: 40 tablet, Rfl: 0    pantoprazole (PROTONIX) 40 mg tablet, Take 1 tablet (40 mg total) by mouth 2 (two) times a day, Disp: 60 tablet, Rfl: 2

## 2025-06-18 NOTE — PROGRESS NOTES
Name: Katherin Iverson      : 1995      MRN: [id number]  Encounter Provider: Ita Gallardo PA-C  Encounter Date: 2025   Encounter department: [facility]  :  Assessment & Plan  Encounter for annual routine gynecological examination    Pap with high risk HPV testing every 5 years, if normal.   Sexually transmitted infection testing collected  Exercise most days of week-minimum of 150-300 minutes per week.   Obtain appropriate diet and hydration.   Calcium 1000mg (in divided doses-max 600 mg at one time) + 600 vit D daily.  HPV 9 vaccine recommended through age 45. Check with your insurance for coverage. If covered, call office to schedule start of vaccine series.    monthly breast self exam recommended.   Kegels 20 times twice daily.   Call your insurance company to verify coverage prior to completing any ordered tests.   Return to office in one year or sooner, if needed.      Screening for cervical cancer    Orders:    Liquid-based pap, screening    Screening for STD (sexually transmitted disease)    Orders:    Chlamydia/GC amplified DNA by PCR    HIV 1/2 AG/AB w Reflex [facility] for 2 yr old and above    Hepatitis B surface antigen; Future    Hepatitis C antibody; Future    RPR-Syphilis Screening (Total Syphilis IGG/IGM)            History of Present Illness   HPI  Katherin Iverson is a 29 y.o. female who presents for routine yearly examination    LMP 25 - 5 days (regular 28 days), first day is crampy but nothing significant   Sexually active Not currently, Ended a relationship in may  Birth control No  Desiring pregnancy in the next year No  History of abnormal pap: No  Last pap 2017 , Findings NILM , Next pap: today  Self breast exam No  HPV vaccine series completed: No  STD testing: accepted    Patient has a history of recurrent BV will use boric acid.  Patient was recently treated for a yeast infection due to taking a lot of antibiotics (patient had H.  pylori infection) she denies any itching odor or discharge currently    Hereditary Cancer Screening  FH Breast/Ovarian cancer: Maternal GM   FH Uterine cancer: denies  FH Colon cancer: denies  Cancer-related family history includes Breast cancer in her maternal grandmother; Lung cancer in her paternal grandmother.      Review of Systems   Constitutional:  Negative for chills, fatigue and fever.   Gastrointestinal:  Positive for constipation (from weight loss medication, uses miralax to manage). Negative for abdominal pain, anal bleeding, blood in stool, diarrhea, nausea and vomiting.   Genitourinary:  Negative for difficulty urinating, dysuria, frequency, hematuria, menstrual problem, pelvic pain, urgency, vaginal bleeding, vaginal discharge and vaginal pain.     Medications Ordered Prior to Encounter[1]   Social History[2]     Objective   /78 (BP Location: Left arm, Patient Position: Sitting, Cuff Size: Standard)   Wt 90.9 kg (200 lb 6.4 oz)   BMI 37.87 kg/m²      Physical Exam  Vitals and nursing note reviewed.   Constitutional:       General: She is not in acute distress.     Appearance: She is well-developed.   HENT:      Head: Normocephalic and atraumatic.     Eyes:      Extraocular Movements: Extraocular movements intact.     Pulmonary:      Effort: Pulmonary effort is normal.   Chest:   Breasts:     Right: No swelling, bleeding, inverted nipple, mass, nipple discharge, skin change or tenderness.      Left: No swelling, bleeding, inverted nipple, mass, nipple discharge, skin change or tenderness.   Abdominal:      Palpations: Abdomen is soft.      Tenderness: There is no abdominal tenderness.      Hernia: There is no hernia in the left inguinal area or right inguinal area.   Genitourinary:     General: Normal vulva.      Exam position: Lithotomy position.      Pubic Area: No rash.       Labia:         Right: No rash, tenderness or lesion.         Left: No rash, tenderness or lesion.       Urethra: No  urethral pain or urethral swelling.      Vagina: No erythema, tenderness, bleeding or lesions. Vaginal discharge: Thin white discharge.     Cervix: No cervical motion tenderness, discharge, friability, lesion, erythema or cervical bleeding.      Uterus: Not enlarged and not tender.       Adnexa:         Right: No mass, tenderness or fullness.          Left: No mass, tenderness or fullness.     Lymphadenopathy:      Upper Body:      Right upper body: No supraclavicular, axillary or pectoral adenopathy.      Left upper body: No supraclavicular, axillary or pectoral adenopathy.      Lower Body: No right inguinal adenopathy. No left inguinal adenopathy.     Skin:     General: Skin is warm and dry.     Neurological:      Mental Status: She is alert.     Psychiatric:         Mood and Affect: Mood normal.         Behavior: Behavior normal.         Ita Gallardo PA-C         [1]   Current Outpatient Medications on File Prior to Visit   Medication Sig Dispense Refill    Cholecalciferol (Vitamin D3) 1.25 MG (03490 UT) CAPS Take 1 capsule (50,000 Units total) by mouth 2 (two) times a week (Patient taking differently: Take 50,000 Units by mouth 2 (two) times a week PRN) 8 capsule 4    clindamycin (CLEOCIN T) 1 % lotion After cleansing face in the morning, apply a small amount to entire face. Apply daily. Avoid eyes and corners of the mouth. 60 mL 3    ondansetron (ZOFRAN) 4 mg tablet Take 1 tablet (4 mg total) by mouth every 8 (eight) hours as needed for nausea or vomiting (Patient taking differently: Take 4 mg by mouth every 8 (eight) hours as needed for nausea or vomiting PRN) 40 tablet 0    pantoprazole (PROTONIX) 40 mg tablet Take 1 tablet (40 mg total) by mouth 2 (two) times a day 60 tablet 2    Semaglutide-Weight Management (Wegovy) 0.5 MG/0.5ML Inject 0.5 mg under the skin once a week      spironolactone (ALDACTONE) 50 mg tablet Take 1 pill (50 mg) once in the evening for two weeks. Take with large glass of water. If  well-tolerated, increase to 2 pills (100 mg) in the evening. STOP IMMEDIATELY IF YOU BECOME UNINTENTIONALLY PREGNANT or if you decide to plan for pregnancy. Avoid large amounts of high potassium food or beverages while taking this medication. 60 tablet 2    tretinoin (RETIN-A) 0.025 % cream Apply a pea-sized amount evenly to the entire face one hour before bedtime. Start by applying every other night, advance to nightly as tolerated. Avoid eyes and mouth. 45 g 3    [DISCONTINUED] tetracycline (ACHROMYCIN,SUMYCIN) 500 MG capsule TAKE 1 CAPSULE BY MOUTH 4 TIMES DAILY FOR 3 DAYS (Patient not taking: Reported on 2025)       No current facility-administered medications on file prior to visit.   [2]   Social History  Tobacco Use    Smoking status: Former     Current packs/day: 0.00     Types: Cigarettes     Quit date: 2014     Years since quittin.4     Passive exposure: Past    Smokeless tobacco: Never    Tobacco comments:     Hooka ocass   Vaping Use    Vaping status: Former    Substances: Flavoring   Substance and Sexual Activity    Alcohol use: Not Currently     Comment: socially     Drug use: Yes     Types: Marijuana    Sexual activity: Yes     Partners: Male     Birth control/protection: None

## 2025-06-19 ENCOUNTER — ANNUAL EXAM (OUTPATIENT)
Dept: OBGYN CLINIC | Facility: CLINIC | Age: 30
End: 2025-06-19
Payer: COMMERCIAL

## 2025-06-19 VITALS — WEIGHT: 200.4 LBS | SYSTOLIC BLOOD PRESSURE: 118 MMHG | DIASTOLIC BLOOD PRESSURE: 78 MMHG | BODY MASS INDEX: 37.87 KG/M2

## 2025-06-19 DIAGNOSIS — Z11.3 SCREENING FOR STD (SEXUALLY TRANSMITTED DISEASE): ICD-10-CM

## 2025-06-19 DIAGNOSIS — Z12.4 SCREENING FOR CERVICAL CANCER: ICD-10-CM

## 2025-06-19 DIAGNOSIS — Z01.419 ENCOUNTER FOR ANNUAL ROUTINE GYNECOLOGICAL EXAMINATION: Primary | ICD-10-CM

## 2025-06-19 PROCEDURE — G0476 HPV COMBO ASSAY CA SCREEN: HCPCS

## 2025-06-19 PROCEDURE — S0612 ANNUAL GYNECOLOGICAL EXAMINA: HCPCS

## 2025-06-19 PROCEDURE — 87591 N.GONORRHOEAE DNA AMP PROB: CPT

## 2025-06-19 PROCEDURE — 87491 CHLMYD TRACH DNA AMP PROBE: CPT

## 2025-06-19 PROCEDURE — G0145 SCR C/V CYTO,THINLAYER,RESCR: HCPCS

## 2025-06-19 RX ORDER — SEMAGLUTIDE 0.5 MG/.5ML
0.5 INJECTION, SOLUTION SUBCUTANEOUS WEEKLY
COMMUNITY

## 2025-06-20 LAB
HPV HR 12 DNA CVX QL NAA+PROBE: NEGATIVE
HPV16 DNA CVX QL NAA+PROBE: NEGATIVE
HPV18 DNA CVX QL NAA+PROBE: NEGATIVE

## 2025-06-21 LAB
C TRACH DNA SPEC QL NAA+PROBE: NEGATIVE
N GONORRHOEA DNA SPEC QL NAA+PROBE: NEGATIVE

## 2025-06-26 LAB
LAB AP GYN PRIMARY INTERPRETATION: NORMAL
Lab: NORMAL
PATH INTERP SPEC-IMP: NORMAL

## 2025-07-03 ENCOUNTER — TELEPHONE (OUTPATIENT)
Age: 30
End: 2025-07-03

## 2025-07-03 NOTE — TELEPHONE ENCOUNTER
Jose Rafael dermatology provider will be out of the office on   9/17/25    . Left voice mail message offering patient another appointment same week with different AP. Should patient call back please use view schedules for other available appointments.

## 2025-07-15 ENCOUNTER — TELEPHONE (OUTPATIENT)
Age: 30
End: 2025-07-15

## 2025-07-18 ENCOUNTER — APPOINTMENT (OUTPATIENT)
Age: 30
End: 2025-07-18
Payer: COMMERCIAL

## 2025-07-18 ENCOUNTER — OFFICE VISIT (OUTPATIENT)
Age: 30
End: 2025-07-18
Payer: COMMERCIAL

## 2025-07-18 VITALS
RESPIRATION RATE: 18 BRPM | TEMPERATURE: 98.8 F | SYSTOLIC BLOOD PRESSURE: 116 MMHG | HEIGHT: 61 IN | OXYGEN SATURATION: 98 % | DIASTOLIC BLOOD PRESSURE: 74 MMHG | HEART RATE: 79 BPM | BODY MASS INDEX: 37.76 KG/M2 | WEIGHT: 200 LBS

## 2025-07-18 DIAGNOSIS — E55.9 VITAMIN D DEFICIENCY: ICD-10-CM

## 2025-07-18 DIAGNOSIS — R00.2 PALPITATIONS: Primary | ICD-10-CM

## 2025-07-18 DIAGNOSIS — Z11.3 SCREENING FOR STD (SEXUALLY TRANSMITTED DISEASE): ICD-10-CM

## 2025-07-18 LAB — 25(OH)D3 SERPL-MCNC: 13.1 NG/ML (ref 30–100)

## 2025-07-18 PROCEDURE — 86803 HEPATITIS C AB TEST: CPT

## 2025-07-18 PROCEDURE — 93000 ELECTROCARDIOGRAM COMPLETE: CPT | Performed by: INTERNAL MEDICINE

## 2025-07-18 PROCEDURE — 99213 OFFICE O/P EST LOW 20 MIN: CPT | Performed by: INTERNAL MEDICINE

## 2025-07-18 PROCEDURE — 82306 VITAMIN D 25 HYDROXY: CPT

## 2025-07-18 PROCEDURE — 87340 HEPATITIS B SURFACE AG IA: CPT

## 2025-07-18 NOTE — PROGRESS NOTES
"Name: Katherin Iverson      : 1995      MRN: [id number]  Encounter Provider: Wang Sneed DO  Encounter Date: 2025   Encounter department: [facility]    :  Assessment & Plan  Palpitations    Has been receiving tirzepatide from a [facility] and 1 day after injection with higher dose (suspect 5 mg dose), she experienced some palpitations. Reviewed prior labs. EKG not concerning in the office today. She was reassured and advice was given.    Orders:  •  POCT ECG         History of Present Illness     History of Present Illness  The patient presents for evaluation of heart palpitations.    She was on tirzepatide for a month but has not taken it for the past 3 weeks. After increasing the dose, she experienced heart palpitations the following day, which lasted for about 3 hours. The palpitations were consistent and felt like her heart was skipping a beat. She has a history of palpitations during her pregnancy and occasionally when she consumes too much caffeine. She started with the lowest dose and has not experienced any palpitations since stopping the medication.    She also mentions that she has some blood work due and is curious about the safety of taking spironolactone with tirzepatide.    Social History:  Coffee/Tea/Caffeine-containing Drinks: She consumes caffeine.     Review of Systems   Constitutional: Negative.    Respiratory: Negative.     Cardiovascular: Negative.    Gastrointestinal: Negative.      Objective   /74 (BP Location: Left arm, Patient Position: Sitting, Cuff Size: Large)   Pulse 79   Temp 98.8 °F (37.1 °C) (Tympanic)   Resp 18   Ht 5' 1\" (1.549 m)   Wt 90.7 kg (200 lb)   SpO2 98%   BMI 37.79 kg/m²     Physical Exam  Constitutional:       General: She is not in acute distress.     Appearance: She is obese. She is not ill-appearing.     Cardiovascular:      Rate and Rhythm: Normal rate and regular rhythm.      Heart sounds: No murmur " heard.  Pulmonary:      Effort: Pulmonary effort is normal. No respiratory distress.      Breath sounds: No wheezing.   Abdominal:      General: Bowel sounds are normal. There is no distension.      Tenderness: There is no abdominal tenderness.     Musculoskeletal:      Right lower leg: No edema.      Left lower leg: No edema.     Neurological:      Mental Status: She is alert.       [facility],

## 2025-07-19 LAB
HBV SURFACE AG SER QL: NORMAL
HCV AB SER QL: NORMAL
HIV 1+2 AB+HIV1 P24 AG SERPL QL IA: NORMAL
TREPONEMA PALLIDUM IGG+IGM AB [PRESENCE] IN SERUM OR PLASMA BY IMMUNOASSAY: NORMAL

## 2025-08-15 ENCOUNTER — TELEPHONE (OUTPATIENT)
Age: 30
End: 2025-08-15

## (undated) DEVICE — Device

## (undated) DEVICE — ABDOMINAL PAD: Brand: DERMACEA

## (undated) DEVICE — PACK C-SECTION PBDS

## (undated) DEVICE — SUT VICRYL 0 CTX 36 IN J978H

## (undated) DEVICE — PROXIMATE PLUS MD MULTI-DIRECTIONAL RELEASE SKIN STAPLERS CONTAINS 35 STAINLESS STEEL STAPLES APPROXIMATE CLOSED DIMENSIONS: 6.9MM X 3.9MM WIDE: Brand: PROXIMATE

## (undated) DEVICE — TELFA NON-ADHERENT ABSORBENT DRESSING: Brand: TELFA

## (undated) DEVICE — GLOVE SRG BIOGEL ECLIPSE 7.5

## (undated) DEVICE — SKIN MARKER DUAL TIP WITH RULER CAP, FLEXIBLE RULER AND LABELS: Brand: DEVON

## (undated) DEVICE — GAUZE SPONGES,16 PLY: Brand: CURITY

## (undated) DEVICE — GLOVE INDICATOR PI UNDERGLOVE SZ 8 BLUE